# Patient Record
Sex: FEMALE | Race: WHITE | NOT HISPANIC OR LATINO | Employment: FULL TIME | URBAN - METROPOLITAN AREA
[De-identification: names, ages, dates, MRNs, and addresses within clinical notes are randomized per-mention and may not be internally consistent; named-entity substitution may affect disease eponyms.]

---

## 2017-01-18 ENCOUNTER — OFFICE VISIT (OUTPATIENT)
Dept: INTERNAL MEDICINE | Facility: CLINIC | Age: 42
End: 2017-01-18

## 2017-01-18 VITALS
BODY MASS INDEX: 25.16 KG/M2 | DIASTOLIC BLOOD PRESSURE: 74 MMHG | WEIGHT: 151 LBS | HEIGHT: 65 IN | SYSTOLIC BLOOD PRESSURE: 118 MMHG

## 2017-01-18 DIAGNOSIS — M54.50 CHRONIC BILATERAL LOW BACK PAIN WITHOUT SCIATICA: Primary | ICD-10-CM

## 2017-01-18 DIAGNOSIS — Z00.00 HEALTH CARE MAINTENANCE: ICD-10-CM

## 2017-01-18 DIAGNOSIS — G89.29 CHRONIC BILATERAL LOW BACK PAIN WITHOUT SCIATICA: Primary | ICD-10-CM

## 2017-01-18 PROBLEM — M51.36 DEGENERATION OF INTERVERTEBRAL DISC OF LUMBAR REGION: Status: ACTIVE | Noted: 2017-01-18

## 2017-01-18 PROBLEM — M51.369 DEGENERATION OF INTERVERTEBRAL DISC OF LUMBAR REGION: Status: ACTIVE | Noted: 2017-01-18

## 2017-01-18 PROBLEM — N83.209 HEMORRHAGIC CYST OF OVARY: Status: ACTIVE | Noted: 2017-01-18

## 2017-01-18 PROCEDURE — 99213 OFFICE O/P EST LOW 20 MIN: CPT | Performed by: INTERNAL MEDICINE

## 2017-01-18 RX ORDER — LETROZOLE 2.5 MG/1
TABLET, FILM COATED ORAL
Refills: 0 | COMMUNITY
Start: 2016-12-02 | End: 2017-01-18

## 2017-01-18 RX ORDER — METAXALONE 800 MG/1
800 TABLET ORAL 3 TIMES DAILY PRN
Qty: 90 TABLET | Refills: 5
Start: 2017-01-18 | End: 2020-09-01

## 2017-01-18 NOTE — MR AVS SNAPSHOT
Claudia Price   1/18/2017 11:30 AM   Office Visit    Dept Phone:  [phone]   Encounter #:  [id number]    Provider:  Shasta Doyle MD   Department:  [facility] INTERNAL MEDICINE                Your Full Care Plan              Today's Medication Changes          These changes are accurate as of: 1/18/17  1:02 PM.  If you have any questions, ask your nurse or doctor.               New Medication(s)Ordered:     metaxalone 800 MG tablet   Commonly known as:  SKELAXIN   Take 1 tablet by mouth 3 (Three) Times a Day As Needed for muscle spasms.   Started by:  Shasta Doyle MD         Stop taking medication(s)listed here:     letrozole 2.5 MG tablet   Commonly known as:  FEMARA   Stopped by:  Shasta Doyle MD                Where to Get Your Medications      Information about where to get these medications is not yet available     ! Ask your nurse or doctor about these medications     metaxalone 800 MG tablet                  Your Updated Medication List          This list is accurate as of: 1/18/17  1:02 PM.  Always use your most recent med list.                metaxalone 800 MG tablet   Commonly known as:  SKELAXIN   Take 1 tablet by mouth 3 (Three) Times a Day As Needed for muscle spasms.               You Were Diagnosed With        Codes Comments    Chronic bilateral low back pain without sciatica    -  Primary ICD-10-CM: M54.5, G89.29  ICD-9-CM: 724.2, 338.29     Health care maintenance     ICD-10-CM: Z00.00  ICD-9-CM: V70.0       Instructions     None    Patient Instructions History      Upcoming Appointments     Visit Type Date Time Department    OFFICE VISIT 1/18/2017 11:30 AM [facility]    PHYSICAL 3/1/2017 10:45 AM [facility] Signup     [email or web address] allows you to send messages to your doctor, view your test results, renew your prescriptions, schedule appointments, and more. To sign up, go to [employer] and  "click on the Sign Up Now link in the New User? box. Enter your [employer] Activation Code exactly as it appears below along with the last four digits of your Social Security Number and your Date of Birth () to complete the sign-up process. If you do not sign up before the expiration date, you must request a new code.    [employer] Activation Code: [email or web address]  Expires: 2017 12:55 PM    If you have questions, you can email [email or web address] or call [phone] to talk to our [employer] staff. Remember, [employer] is NOT to be used for urgent needs. For medical emergencies, dial 911.               Other Info from Your Visit           Your Appointments     Mar 01, 2017 10:45 AM EST   Physical with Shasta Doyle MD   [facility] INTERNAL MEDICINE (--)    [address] [address] [phone]           Arrive 15 minutes prior to appointment.              Allergies     Pseudoephedrine  Anxiety, Other (See Comments)    Tachycardia 160's, \"felt like a heart attack\"    Nedocromil        Vital Signs     Blood Pressure Height Weight Body Mass Index Smoking Status       118/74 65\" (165.1 cm) 151 lb (68.5 kg) 25.13 kg/m2 Never Smoker       Problems and Diagnoses Noted     Chronic bilateral low back pain    Degeneration of lumbar or lumbosacral intervertebral disc    Health maintenance examination    Ovarian cyst    Uterine fibroid        "

## 2017-01-18 NOTE — PROGRESS NOTES
Subjective   Claudia Price is a 41 y.o. female. Here for lower back pain exacerbation and new onset intermittent numbness in the abdominal wall and pelvic area    History of Present Illness   Patient with known L spine DDD and retrolisthesis L4-5, mild per MRI, had developed acute exacerbation of her chronic lower back pain 5 days ago. Usually high impact exercise provokes episodes of back pain.This time her midline back pain was associated with numbness in the lower abdominal and pelvic area.Thet preceded immediatly by her doing high impact exercise with jumps and possibly excessive stretching. She took Skelaxin at bedtime and this had helped her s-ms. Later the numbness had returned, and had been intermittent since. There was no problems with urination. At some point she had some radiation of the pain to her buttock and numbness in both feet  The following portions of the patient's history were reviewed and updated as appropriate: allergies, current medications, past family history, past medical history, past social history, past surgical history and problem list.    Review of Systems   Constitutional: Negative for chills and fever.   Eyes: Negative for pain and redness.   Respiratory: Negative for cough and shortness of breath.    Cardiovascular: Negative for chest pain and leg swelling.   Musculoskeletal: Positive for back pain.   Neurological: Positive for numbness (between lower back and thighs). Negative for dizziness and headaches.       Objective   Physical Exam   Constitutional: She is oriented to person, place, and time. She appears well-developed and well-nourished.   HENT:   Head: Normocephalic and atraumatic.   Right Ear: Tympanic membrane, external ear and ear canal normal.   Left Ear: Tympanic membrane, external ear and ear canal normal.   Nose: Nose normal. Right sinus exhibits no maxillary sinus tenderness and no frontal sinus tenderness. Left sinus exhibits no maxillary sinus tenderness and no  frontal sinus tenderness.   Mouth/Throat: Uvula is midline, oropharynx is clear and moist and mucous membranes are normal.   Eyes: Conjunctivae and EOM are normal. Pupils are equal, round, and reactive to light. Right eye exhibits no discharge. Left eye exhibits no discharge. No scleral icterus.   Neck: Neck supple. No JVD present.   Cardiovascular: Normal rate, regular rhythm and normal heart sounds.  Exam reveals no gallop and no friction rub.    No murmur heard.  Pulmonary/Chest: Effort normal and breath sounds normal. She has no wheezes. She has no rales.   Musculoskeletal: She exhibits tenderness (on paravertebral palpation along the left L3-5). She exhibits no edema.   Lymphadenopathy:     She has no cervical adenopathy.   Neurological: She is alert and oriented to person, place, and time. No cranial nerve deficit.   Skin: Skin is warm and dry. No rash noted.   Psychiatric: She has a normal mood and affect. Her behavior is normal.   Vitals reviewed.      Assessment/Plan   Diagnoses and all orders for this visit:    Chronic bilateral low back pain without sciatica  -     metaxalone (SKELAXIN) 800 MG tablet; Take 1 tablet by mouth 3 (Three) Times a Day As Needed for muscle spasms.  -     MRI Lumbar Spine With Contrast; Future      Acute exacerbation of the LBP with neurological s-ms - will refer for MRI. Also advised to try heat and Salonpas patches topically. Needs gentle stretching exercise.

## 2017-02-02 PROBLEM — M51.37 DEGENERATIVE DISC DISEASE AT L5-S1 LEVEL: Status: ACTIVE | Noted: 2017-02-02

## 2017-02-02 PROBLEM — M51.379 DEGENERATIVE DISC DISEASE AT L5-S1 LEVEL: Status: ACTIVE | Noted: 2017-02-02

## 2017-02-03 PROBLEM — D25.1 INTRAMURAL LEIOMYOMA OF UTERUS: Status: ACTIVE | Noted: 2017-02-03

## 2017-03-01 ENCOUNTER — OFFICE VISIT (OUTPATIENT)
Dept: INTERNAL MEDICINE | Facility: CLINIC | Age: 42
End: 2017-03-01

## 2017-03-01 VITALS
SYSTOLIC BLOOD PRESSURE: 106 MMHG | DIASTOLIC BLOOD PRESSURE: 66 MMHG | BODY MASS INDEX: 24.49 KG/M2 | WEIGHT: 147 LBS | HEIGHT: 65 IN

## 2017-03-01 DIAGNOSIS — Z00.00 HEALTH CARE MAINTENANCE: Primary | ICD-10-CM

## 2017-03-01 PROBLEM — I73.00 RAYNAUD PHENOMENON: Status: ACTIVE | Noted: 2017-03-01

## 2017-03-01 PROBLEM — J45.909 COLD-INDUCED ASTHMA WITHOUT COMPLICATION: Status: ACTIVE | Noted: 2017-03-01

## 2017-03-01 PROCEDURE — 99396 PREV VISIT EST AGE 40-64: CPT | Performed by: INTERNAL MEDICINE

## 2017-03-01 NOTE — PROGRESS NOTES
"Subjective   Claudia Price is a 41 y.o. female. Here for CPE    History of Present Illness   Visit Vitals   • /66   • Ht 65\" (165.1 cm)   • Wt 147 lb (66.7 kg)   • BMI 24.46 kg/m2     Patient is here for yearly CPE. Last CPE was  1.5 year ago.  PMH, SH and FH reviewed. Changes in the FH: none .  Patient rates her own health as: good.  Tobacco use: none.  Alcohol use: occasionally.  Recreational drugs use: none  Medication list rewieved.  Diet: vegetarian.  Exercise: 5-6 days a week, resistance training and aerobic exercise.  Marital status: .   Employment: full time.  Patient rates her stress level as: low.  Dental health: good. Brushes teeth 2 times a day, flosses 1 time a day . Dental visits: 2 times a year .  Vision correction: not needed  Hearing: normal.    Recent vaccinations:   Flu  is up to date and recommended yearly  Tdap  is up to date  Zostavax not recommended at this age    Patient is premenopausal. Past pregnancies: . Currently patient is on no contraception.      Current Outpatient Prescriptions:   •  metaxalone (SKELAXIN) 800 MG tablet, Take 1 tablet by mouth 3 (Three) Times a Day As Needed for muscle spasms., Disp: 90 tablet, Rfl: 5                           The following portions of the patient's history were reviewed and updated as appropriate: allergies, current medications, past family history, past medical history, past social history, past surgical history and problem list.    Review of Systems   Constitutional: Negative for chills and fever.   HENT: Positive for sinus pressure. Negative for postnasal drip and sore throat.    Eyes: Negative for pain and itching.   Respiratory: Negative for cough and chest tightness.    Cardiovascular: Negative for chest pain and leg swelling.   Gastrointestinal: Negative for abdominal pain and blood in stool.   Endocrine: Negative for cold intolerance and heat intolerance.   Genitourinary: Negative for difficulty urinating and flank pain. "   Musculoskeletal: Positive for back pain. Negative for neck pain.   Skin: Negative for color change and rash.   Neurological: Positive for light-headedness. Negative for dizziness, weakness and headaches.   Hematological: Negative for adenopathy. Does not bruise/bleed easily.   Psychiatric/Behavioral: Negative for sleep disturbance. The patient is not nervous/anxious.        Objective   Physical Exam   Constitutional: She is oriented to person, place, and time. Vital signs are normal. She appears well-developed and well-nourished. No distress.   HENT:   Head: Normocephalic and atraumatic.   Right Ear: External ear normal.   Left Ear: External ear normal.   Nose: Nose normal. No mucosal edema. Right sinus exhibits no maxillary sinus tenderness and no frontal sinus tenderness. Left sinus exhibits no maxillary sinus tenderness and no frontal sinus tenderness.   Mouth/Throat: Oropharynx is clear and moist. No oropharyngeal exudate.   Eyes: Conjunctivae, EOM and lids are normal. Pupils are equal, round, and reactive to light. Right eye exhibits no discharge. Left eye exhibits no discharge. Right conjunctiva is not injected. Left conjunctiva is not injected. No scleral icterus. Right eye exhibits normal extraocular motion. Left eye exhibits normal extraocular motion.   Neck: Normal range of motion and full passive range of motion without pain. Neck supple. No JVD present. Carotid bruit is not present. No thyromegaly present.   Cardiovascular: Normal rate, regular rhythm, S1 normal, S2 normal, normal heart sounds and intact distal pulses.  PMI is not displaced.  Exam reveals no gallop and no friction rub.    No murmur heard.  Pulmonary/Chest: Effort normal and breath sounds normal. No accessory muscle usage. No respiratory distress. She has no decreased breath sounds. She has no wheezes. She has no rhonchi. She has no rales.   Abdominal: Soft. Bowel sounds are normal. She exhibits no distension, no pulsatile liver, no  fluid wave, no abdominal bruit, no ascites and no mass. There is no tenderness. There is no rebound and no guarding.   Musculoskeletal: She exhibits no edema or deformity.   Lymphadenopathy:        Head (right side): No submental, no submandibular, no preauricular, no posterior auricular and no occipital adenopathy present.        Head (left side): No submental, no submandibular, no tonsillar, no preauricular, no posterior auricular and no occipital adenopathy present.     She has no cervical adenopathy.        Right cervical: No superficial cervical, no deep cervical and no posterior cervical adenopathy present.       Left cervical: No superficial cervical, no deep cervical and no posterior cervical adenopathy present.        Right: No supraclavicular adenopathy present.        Left: No supraclavicular adenopathy present.   Neurological: She is alert and oriented to person, place, and time. She has normal strength and normal reflexes. She displays normal reflexes. No cranial nerve deficit. She exhibits normal muscle tone. Coordination normal.   Reflex Scores:       Bicep reflexes are 2+ on the right side and 2+ on the left side.       Patellar reflexes are 2+ on the right side and 2+ on the left side.  Skin: Skin is warm and dry. No rash noted. She is not diaphoretic. No erythema.   Psychiatric: She has a normal mood and affect. Her speech is normal and behavior is normal. Thought content normal.   Vitals reviewed.      Assessment/Plan   Diagnoses and all orders for this visit:    Health care maintenance  -     CBC & Differential; Future  -     Comprehensive Metabolic Panel; Future  -     Lipid Panel; Future  -     TSH; Future  -     Urinalysis With / Microscopic If Indicated; Future        Risk evaluation:  1. Cardiovascular risk factors: none   2. Diabetes risk factors: none.  3. Cancer risk factors: no risk factors for cancer.  4. Risky behavior: none. Use of seat belts: regular. Use of sunscreens: regular. SPF  30+.   Tattoos: none. H/o blood transfusions/organ transplants before 1992 or clotting factor transfusion before 1987: none.     Prevention:  Cholesterol test  will check.  Blood sugar test will check.   Hep C testing (for patients born [birth year]33[birth year]): not needed, patient is not in the age group, and has no risk factors..  Mammogram up to date. Recommended frequency and time of the next screening per GYN.. Breast self exams recommended once a month.  Colonoscopy: not recommended till the age of 50..  PAP smear : up to date. Recommendations per GYN..  DEXA : not indicated yet..     LBP - improved with the change in activity. Continue Skelaxin at bedtime as needed.

## 2017-03-08 ENCOUNTER — TRANSCRIBE ORDERS (OUTPATIENT)
Dept: ADMINISTRATIVE | Facility: HOSPITAL | Age: 42
End: 2017-03-08

## 2017-03-08 DIAGNOSIS — Z12.31 VISIT FOR SCREENING MAMMOGRAM: Primary | ICD-10-CM

## 2017-03-29 ENCOUNTER — HOSPITAL ENCOUNTER (OUTPATIENT)
Dept: MAMMOGRAPHY | Facility: HOSPITAL | Age: 42
Discharge: HOME | End: 2017-03-29
Attending: SPECIALIST | Admitting: SPECIALIST

## 2017-03-29 DIAGNOSIS — Z12.31 VISIT FOR SCREENING MAMMOGRAM: ICD-10-CM

## 2017-03-29 PROCEDURE — 77063 BREAST TOMOSYNTHESIS BI: CPT

## 2017-03-29 PROCEDURE — G0202 SCR MAMMO BI INCL CAD: HCPCS

## 2017-07-27 ENCOUNTER — OFFICE VISIT (OUTPATIENT)
Dept: INTERNAL MEDICINE | Facility: CLINIC | Age: 42
End: 2017-07-27

## 2017-07-27 VITALS
WEIGHT: 146 LBS | BODY MASS INDEX: 24.32 KG/M2 | SYSTOLIC BLOOD PRESSURE: 120 MMHG | RESPIRATION RATE: 14 BRPM | HEIGHT: 65 IN | DIASTOLIC BLOOD PRESSURE: 80 MMHG

## 2017-07-27 DIAGNOSIS — G44.52 NEW DAILY PERSISTENT HEADACHE: Primary | ICD-10-CM

## 2017-07-27 PROCEDURE — 99213 OFFICE O/P EST LOW 20 MIN: CPT | Performed by: INTERNAL MEDICINE

## 2017-07-27 RX ORDER — AMITRIPTYLINE HYDROCHLORIDE 25 MG/1
25 TABLET, FILM COATED ORAL NIGHTLY
Qty: 30 TABLET | Refills: 3 | Status: SHIPPED | OUTPATIENT
Start: 2017-07-27 | End: 2021-10-19

## 2017-07-27 RX ORDER — METHYLPREDNISOLONE 4 MG/1
TABLET ORAL
Refills: 0 | COMMUNITY
Start: 2017-07-25 | End: 2017-09-14

## 2017-07-27 RX ORDER — FLUTICASONE PROPIONATE 50 MCG
1 SPRAY, SUSPENSION (ML) NASAL
COMMUNITY

## 2017-07-27 RX ORDER — IBUPROFEN 600 MG/1
600 TABLET ORAL EVERY 6 HOURS PRN
COMMUNITY
End: 2021-10-19

## 2017-07-27 RX ORDER — CETIRIZINE HYDROCHLORIDE 10 MG/1
10 TABLET ORAL
COMMUNITY

## 2017-07-27 NOTE — PATIENT INSTRUCTIONS
New onset daily headache - possibly TMJ vs tension. Will continue Ibuprofen, but ask patiuent to talk  To her dentist re teeth grinding and start Elavil 25 mg at bedtime. If not better after 3 days, increase to 2 pills at bedtime. Call is not better.

## 2017-07-27 NOTE — PROGRESS NOTES
"Subjective   Claudia Price is a 41 y.o. female. Here for the persistent headache    History of Present Illness   /80 (BP Location: Left arm, Patient Position: Sitting, Cuff Size: Adult)  Resp 14  Ht 65\" (165.1 cm)  Wt 146 lb (66.2 kg)  BMI 24.3 kg/m2  Claudia Price 41 y.o. female presents to the office complaining of the headache. Location of the headache is parietal,  in the upper face, in the lower face and in the right TMJ.   Symptoms started about 2 weeksago. Woke up with throbbing pain in the right parietal area, that slowly moved down the right head and face. Patient states that headaches are daily. Patient had been having headaches continously every day.  Precipitating factors:none which have been determined.Had some neck adjustment that failed to help. No added stress. Had been using OTC Ibuprofen 600 mg TID (originally for the shoulder) with relief. Reports good hydration. Denies snoring.  Work attendance and other daily activities has had not been affected by headaches lately. Denies nausea, photo- and phonophobias, no aura. No prior h/o migraines, but has dignificant headache back in 2013, at that time her head MRI was fine.      Current Outpatient Prescriptions:   •  cetirizine (zyrTEC) 10 MG tablet, Take 10 mg by mouth., Disp: , Rfl:   •  fluticasone (FLONASE) 50 MCG/ACT nasal spray, 1 spray into each nostril., Disp: , Rfl:   •  ibuprofen (ADVIL,MOTRIN) 600 MG tablet, Take 600 mg by mouth Every 6 (Six) Hours As Needed for Mild Pain (1-3)., Disp: , Rfl:   •  metaxalone (SKELAXIN) 800 MG tablet, Take 1 tablet by mouth 3 (Three) Times a Day As Needed for muscle spasms., Disp: 90 tablet, Rfl: 5  •  MethylPREDNISolone (MEDROL, SUSAN,) 4 MG tablet, TK UTD, Disp: , Rfl: 0     The following portions of the patient's history were reviewed and updated as appropriate: allergies, current medications, past family history, past medical history, past social history, past surgical history and problem list.    Review " of Systems   Constitutional: Negative for chills and fever.   Eyes: Negative for pain and redness.   Respiratory: Negative for cough and shortness of breath.    Cardiovascular: Negative for chest pain and leg swelling.   Neurological: Negative for dizziness and headaches.       Objective   Physical Exam   Constitutional: She is oriented to person, place, and time. She appears well-developed and well-nourished.   HENT:   Head: Normocephalic and atraumatic.   Right Ear: Tympanic membrane, external ear and ear canal normal.   Left Ear: Tympanic membrane, external ear and ear canal normal.   Nose: Nose normal. Right sinus exhibits no maxillary sinus tenderness and no frontal sinus tenderness. Left sinus exhibits no maxillary sinus tenderness and no frontal sinus tenderness.   Mouth/Throat: Uvula is midline, oropharynx is clear and moist and mucous membranes are normal.   Eyes: Conjunctivae and EOM are normal. Pupils are equal, round, and reactive to light. Right eye exhibits no discharge. Left eye exhibits no discharge. No scleral icterus.   Neck: Neck supple. No JVD present.   Cardiovascular: Normal rate, regular rhythm and normal heart sounds.  Exam reveals no gallop and no friction rub.    No murmur heard.  Pulmonary/Chest: Effort normal and breath sounds normal. She has no wheezes. She has no rales.   Musculoskeletal: She exhibits tenderness (minimal R TMJ tenderness on palpation with ROM). She exhibits no edema.   Lymphadenopathy:     She has no cervical adenopathy.   Neurological: She is alert and oriented to person, place, and time. No cranial nerve deficit.   Skin: Skin is warm and dry. No rash noted.   Psychiatric: She has a normal mood and affect. Her behavior is normal.   Vitals reviewed.      Assessment/Plan   Claudia was seen today for headache.    Diagnoses and all orders for this visit:    New daily persistent headache    New onset daily headache - possibly TMJ vs tension. Will continue Ibuprofen, but ask  patiuent to talk  To her dentist re teeth grinding and start Elavil 25 mg at bedtime. If not better after 3 days, increase to 2 pills at bedtime. Call is not better.

## 2017-09-14 ENCOUNTER — OFFICE VISIT (OUTPATIENT)
Dept: INTERNAL MEDICINE | Facility: CLINIC | Age: 42
End: 2017-09-14

## 2017-09-14 VITALS
DIASTOLIC BLOOD PRESSURE: 78 MMHG | WEIGHT: 149 LBS | TEMPERATURE: 98.1 F | SYSTOLIC BLOOD PRESSURE: 112 MMHG | BODY MASS INDEX: 24.79 KG/M2

## 2017-09-14 DIAGNOSIS — H10.9 BACTERIAL CONJUNCTIVITIS OF RIGHT EYE: Primary | ICD-10-CM

## 2017-09-14 PROCEDURE — 99213 OFFICE O/P EST LOW 20 MIN: CPT | Performed by: NURSE PRACTITIONER

## 2017-09-14 RX ORDER — CIPROFLOXACIN HYDROCHLORIDE 3.5 MG/ML
1 SOLUTION/ DROPS TOPICAL
Qty: 2.5 ML | Refills: 0 | Status: SHIPPED | OUTPATIENT
Start: 2017-09-14 | End: 2017-09-21

## 2017-09-14 NOTE — PROGRESS NOTES
Subjective   Claudia Price is a 41 y.o. female.     HPI Comments: She was at a kids party this weekend.     Eye Problem    The right eye is affected. This is a new problem. The current episode started today. There was no injury mechanism. The pain is at a severity of 0/10. The patient is experiencing no pain. There is no known exposure to pink eye. She does not wear contacts. Associated symptoms include blurred vision, an eye discharge, eye redness, itching and a recent URI (about 4 weeks ago ). Pertinent negatives include no double vision, fever, foreign body sensation, nausea, photophobia or vomiting. She has tried water for the symptoms. The treatment provided mild relief.        The following portions of the patient's history were reviewed and updated as appropriate: allergies, current medications and problem list.    Review of Systems   Constitutional: Negative for appetite change, chills, fatigue and fever.   HENT: Positive for rhinorrhea, sinus pressure (very little bit ) and sneezing. Negative for congestion, ear discharge, ear pain, facial swelling, hearing loss, sore throat and tinnitus.    Eyes: Positive for blurred vision, discharge, redness and itching. Negative for double vision, photophobia and pain (eye burning ).   Respiratory: Positive for cough (dry cough at nigh time, getting better ). Negative for chest tightness, shortness of breath and wheezing.    Cardiovascular: Negative for chest pain.   Gastrointestinal: Negative for abdominal pain, diarrhea, nausea and vomiting.   Musculoskeletal: Negative for neck pain and neck stiffness.   Skin: Positive for itching.   Allergic/Immunologic: Positive for environmental allergies.   Neurological: Negative for headaches.   Hematological: Negative for adenopathy.       Objective   Physical Exam   Constitutional: She appears well-developed and well-nourished. She is cooperative. She does not have a sickly appearance. She does not appear ill.   HENT:   Head:  Normocephalic.   Right Ear: Hearing, tympanic membrane and external ear normal. No drainage, swelling or tenderness. No mastoid tenderness. Tympanic membrane is not injected, not scarred, not erythematous and not bulging. Tympanic membrane mobility is normal. No middle ear effusion. No decreased hearing is noted.   Left Ear: Hearing, tympanic membrane and external ear normal. No drainage, swelling or tenderness. No mastoid tenderness. Tympanic membrane is not injected, not scarred, not erythematous and not bulging. Tympanic membrane mobility is normal.  No middle ear effusion. No decreased hearing is noted.   Nose: No mucosal edema, rhinorrhea, sinus tenderness or nasal deformity. Right sinus exhibits maxillary sinus tenderness. Right sinus exhibits no frontal sinus tenderness. Left sinus exhibits maxillary sinus tenderness. Left sinus exhibits no frontal sinus tenderness.   Mouth/Throat: Oropharynx is clear and moist and mucous membranes are normal. Normal dentition.   Eyes: EOM and lids are normal. Pupils are equal, round, and reactive to light. Right eye exhibits no discharge and no exudate. Left eye exhibits no discharge and no exudate. Right conjunctiva is injected.   Neck: Trachea normal and normal range of motion. No edema present. No thyroid mass and no thyromegaly present.   Cardiovascular: Regular rhythm, normal heart sounds and normal pulses.    No murmur heard.  Pulmonary/Chest: Breath sounds normal. No respiratory distress. She has no decreased breath sounds. She has no wheezes. She has no rhonchi. She has no rales.   Lymphadenopathy:        Head (right side): No submental, no submandibular, no tonsillar, no preauricular, no posterior auricular and no occipital adenopathy present.        Head (left side): No submental, no submandibular, no tonsillar, no preauricular, no posterior auricular and no occipital adenopathy present.     She has no cervical adenopathy.   Neurological: She is alert.   Skin: Skin  is warm, dry and intact. No cyanosis. Nails show no clubbing.       Assessment/Plan   Claudia was seen today for eye problem.    Diagnoses and all orders for this visit:    Bacterial conjunctivitis of right eye  -     ciprofloxacin (CILOXAN) 0.3 % ophthalmic solution; Administer 1 drop to the right eye Every 4 (Four) Hours While Awake for 7 days.        Work excuse provided. She may return in 24 hours after starting antibiotic. She was instructed to no eye makeup.

## 2018-04-11 ENCOUNTER — TRANSCRIBE ORDERS (OUTPATIENT)
Dept: ADMINISTRATIVE | Facility: HOSPITAL | Age: 43
End: 2018-04-11

## 2018-04-11 DIAGNOSIS — Z12.31 SCREENING MAMMOGRAM, ENCOUNTER FOR: Primary | ICD-10-CM

## 2018-04-25 ENCOUNTER — HOSPITAL ENCOUNTER (OUTPATIENT)
Dept: MAMMOGRAPHY | Facility: HOSPITAL | Age: 43
Discharge: HOME | End: 2018-04-25
Attending: SPECIALIST | Admitting: SPECIALIST

## 2018-04-25 DIAGNOSIS — Z12.31 SCREENING MAMMOGRAM, ENCOUNTER FOR: ICD-10-CM

## 2018-04-25 PROCEDURE — 77063 BREAST TOMOSYNTHESIS BI: CPT

## 2018-04-25 PROCEDURE — 77067 SCR MAMMO BI INCL CAD: CPT

## 2018-05-16 ENCOUNTER — TELEPHONE (OUTPATIENT)
Dept: INTERNAL MEDICINE | Facility: CLINIC | Age: 43
End: 2018-05-16

## 2018-05-16 NOTE — TELEPHONE ENCOUNTER
----- Message from Annika Egan sent at 5/16/2018 11:16 AM EDT -----  Contact: pt - Dr Doyle's pt - RE: lab order & Factor 5  Pt calling and would like to have labs drawn on 05/30/2018 prior to CPE on 06/06/2018. Pt would also like to have a Factor 5 drawn as well. Pt informs father was just Dx with and is hereditary. Could you please add to pt's other labs? Please advise. Thanks

## 2018-05-17 DIAGNOSIS — D68.8 HEREDITARY THROMBOPHILIA (HCC): ICD-10-CM

## 2018-05-17 DIAGNOSIS — Z00.00 HEALTH CARE MAINTENANCE: Primary | ICD-10-CM

## 2018-05-17 PROBLEM — M51.379 DEGENERATIVE DISC DISEASE AT L5-S1 LEVEL: Status: RESOLVED | Noted: 2017-02-02 | Resolved: 2018-05-17

## 2018-05-17 PROBLEM — G44.52 NEW DAILY PERSISTENT HEADACHE: Status: RESOLVED | Noted: 2017-07-27 | Resolved: 2018-05-17

## 2018-05-17 PROBLEM — M51.37 DEGENERATIVE DISC DISEASE AT L5-S1 LEVEL: Status: RESOLVED | Noted: 2017-02-02 | Resolved: 2018-05-17

## 2018-05-22 ENCOUNTER — RESULTS ENCOUNTER (OUTPATIENT)
Dept: INTERNAL MEDICINE | Facility: CLINIC | Age: 43
End: 2018-05-22

## 2018-05-22 DIAGNOSIS — D68.8 HEREDITARY THROMBOPHILIA (HCC): ICD-10-CM

## 2018-06-06 ENCOUNTER — OFFICE VISIT (OUTPATIENT)
Dept: INTERNAL MEDICINE | Facility: CLINIC | Age: 43
End: 2018-06-06

## 2018-06-06 VITALS
DIASTOLIC BLOOD PRESSURE: 74 MMHG | BODY MASS INDEX: 24.99 KG/M2 | WEIGHT: 150 LBS | HEIGHT: 65 IN | SYSTOLIC BLOOD PRESSURE: 122 MMHG | RESPIRATION RATE: 14 BRPM

## 2018-06-06 DIAGNOSIS — Z00.00 HEALTH CARE MAINTENANCE: Primary | ICD-10-CM

## 2018-06-06 PROBLEM — Z98.890 H/O MYOMECTOMY: Status: ACTIVE | Noted: 2017-11-22

## 2018-06-06 PROBLEM — D68.51 HETEROZYGOUS FACTOR V LEIDEN MUTATION (HCC): Status: ACTIVE | Noted: 2018-06-06

## 2018-06-06 PROCEDURE — 99396 PREV VISIT EST AGE 40-64: CPT | Performed by: INTERNAL MEDICINE

## 2018-06-06 NOTE — PROGRESS NOTES
"Subjective   Claudia Price is a 42 y.o. female.     History of Present Illness   /74 (BP Location: Left arm, Patient Position: Sitting, Cuff Size: Adult)   Resp 14   Ht 165.1 cm (65\")   Wt 68 kg (150 lb)   BMI 24.96 kg/m²   Patient is here for yearly CPE. Last CPE was  1 year ago.  PMH, SH and FH reviewed. Changes in the FH: none .  Patient rates her own health as: good.  Tobacco use: none.  Alcohol use: once a week.  Recreational drugs use: none  Medication list rewieved.  Diet: well balanced.  Exercise: 5-6 days a week, resistance training and aerobic exercise.  Marital status: .   Employment: full time.  Patient rates her stress level as: moderate.  Dental health: good. Brushes teeth 2 times a day, flosses 1 time a day . Dental visits: 2 times a year .  Vision correction: not needed  Hearing: normal.    Recent vaccinations:   Flu  is up to date and recommended yearly  Tdap  is up to date  Zostavax not recommended at this age    Patient is perimenopausal. Past pregnancies: . Currently patient is on no contraception.      Current Outpatient Prescriptions:   •  amitriptyline (ELAVIL) 25 MG tablet, Take 1 tablet by mouth Every Night., Disp: 30 tablet, Rfl: 3  •  cetirizine (zyrTEC) 10 MG tablet, Take 10 mg by mouth., Disp: , Rfl:   •  EVENING PRIMROSE OIL PO, Take  by mouth., Disp: , Rfl:   •  fluticasone (FLONASE) 50 MCG/ACT nasal spray, 1 spray into each nostril., Disp: , Rfl:   •  ibuprofen (ADVIL,MOTRIN) 600 MG tablet, Take 600 mg by mouth Every 6 (Six) Hours As Needed for Mild Pain (1-3)., Disp: , Rfl:   •  metaxalone (SKELAXIN) 800 MG tablet, Take 1 tablet by mouth 3 (Three) Times a Day As Needed for muscle spasms., Disp: 90 tablet, Rfl: 5        The following portions of the patient's history were reviewed and updated as appropriate: allergies, current medications, past family history, past medical history, past social history, past surgical history and problem list.    Review of Systems "   Constitutional: Negative for chills and fever.   HENT: Negative for postnasal drip, sinus pressure and sore throat.    Eyes: Negative for pain and itching.   Respiratory: Negative for cough and chest tightness.    Cardiovascular: Negative for chest pain and leg swelling.   Gastrointestinal: Negative for abdominal pain and blood in stool.   Endocrine: Negative for cold intolerance and heat intolerance.   Genitourinary: Negative for difficulty urinating and flank pain.   Musculoskeletal: Negative for back pain and neck pain.   Skin: Negative for color change and rash.   Neurological: Negative for dizziness, weakness and headaches.   Hematological: Negative for adenopathy. Does not bruise/bleed easily.   Psychiatric/Behavioral: Negative for sleep disturbance. The patient is not nervous/anxious.        Objective   Physical Exam   Constitutional: She is oriented to person, place, and time. Vital signs are normal. She appears well-developed. No distress.   central weight distribution   HENT:   Head: Normocephalic and atraumatic.   Right Ear: External ear normal.   Left Ear: External ear normal.   Nose: Nose normal. No mucosal edema. Right sinus exhibits no maxillary sinus tenderness and no frontal sinus tenderness. Left sinus exhibits no maxillary sinus tenderness and no frontal sinus tenderness.   Mouth/Throat: Oropharynx is clear and moist. No oropharyngeal exudate.   Eyes: Conjunctivae, EOM and lids are normal. Pupils are equal, round, and reactive to light. Right eye exhibits no discharge. Left eye exhibits no discharge. Right conjunctiva is not injected. Left conjunctiva is not injected. No scleral icterus. Right eye exhibits normal extraocular motion. Left eye exhibits normal extraocular motion.   Neck: Normal range of motion and full passive range of motion without pain. Neck supple. No JVD present. Carotid bruit is not present. No thyromegaly present.   Cardiovascular: Normal rate, regular rhythm, S1 normal, S2  normal, normal heart sounds and intact distal pulses.  PMI is not displaced.  Exam reveals no gallop and no friction rub.    No murmur heard.  Pulmonary/Chest: Effort normal and breath sounds normal. No accessory muscle usage. No respiratory distress. She has no decreased breath sounds. She has no wheezes. She has no rhonchi. She has no rales.   Abdominal: Soft. Bowel sounds are normal. She exhibits no distension, no pulsatile liver, no fluid wave, no abdominal bruit, no ascites and no mass. There is no tenderness. There is no rebound and no guarding.   Musculoskeletal: She exhibits no edema or deformity.   Lymphadenopathy:        Head (right side): No submental, no submandibular, no preauricular, no posterior auricular and no occipital adenopathy present.        Head (left side): No submental, no submandibular, no tonsillar, no preauricular, no posterior auricular and no occipital adenopathy present.     She has no cervical adenopathy.        Right cervical: No superficial cervical, no deep cervical and no posterior cervical adenopathy present.       Left cervical: No superficial cervical, no deep cervical and no posterior cervical adenopathy present.        Right: No supraclavicular adenopathy present.        Left: No supraclavicular adenopathy present.   Neurological: She is alert and oriented to person, place, and time. She has normal strength and normal reflexes. She displays normal reflexes. No cranial nerve deficit. She exhibits normal muscle tone. Coordination normal.   Reflex Scores:       Bicep reflexes are 2+ on the right side and 2+ on the left side.       Patellar reflexes are 2+ on the right side and 2+ on the left side.  Skin: Skin is warm and dry. No rash noted. She is not diaphoretic. No erythema.   Psychiatric: She has a normal mood and affect. Her speech is normal and behavior is normal. Thought content normal.   Vitals reviewed.      Assessment/Plan   Claudia was seen today for annual  exam.    Diagnoses and all orders for this visit:    Health care maintenance  -     CBC & Differential; Future  -     Comprehensive Metabolic Panel; Future  -     Lipid Panel; Future  -     TSH; Future  -     Urinalysis With / Microscopic If Indicated (No Culture) - Urine, Clean Catch; Future      Risk evaluation:  1. Cardiovascular risk factors: family history of CAD   2. Diabetes risk factors: none.  3. Cancer risk factors: FH of breast cancer.  4. Risky behavior: none. Use of seat belts: regular. Use of sunscreens: regular. SPF 50.   Tattoos: one.  H/o blood transfusions/organ transplants before 1992 or clotting factor transfusion before 1987: none.     Prevention:  Cholesterol test  up to date. Cholesterol is excellent..  Blood sugar test up to date. Fasting blood sugar  is normal..  Hep C testing (for patients born [id number]): not needed, patient is not in the age group, and has no risk factors..  Mammogram up to date. Recommended frequency and time of the next screening per GYN.. Breast self exams recommended once a month.  Colonoscopy: not recommended till the age of 50..  PAP smear : up to date. Recommendations per GYN..  DEXA : not indicated yet..                      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 Wear comp[eression stockings for long car rides and airplane rides.

## 2018-06-07 DIAGNOSIS — Z00.00 HEALTH CARE MAINTENANCE: ICD-10-CM

## 2018-06-11 DIAGNOSIS — Z00.00 HEALTH CARE MAINTENANCE: ICD-10-CM

## 2018-10-19 ENCOUNTER — OFFICE VISIT (OUTPATIENT)
Dept: INTERNAL MEDICINE | Facility: CLINIC | Age: 43
End: 2018-10-19

## 2018-10-19 VITALS
DIASTOLIC BLOOD PRESSURE: 82 MMHG | HEART RATE: 75 BPM | SYSTOLIC BLOOD PRESSURE: 116 MMHG | BODY MASS INDEX: 25.33 KG/M2 | WEIGHT: 152 LBS | TEMPERATURE: 98.1 F | HEIGHT: 65 IN | OXYGEN SATURATION: 98 %

## 2018-10-19 DIAGNOSIS — J01.90 ACUTE SINUSITIS, RECURRENCE NOT SPECIFIED, UNSPECIFIED LOCATION: Primary | ICD-10-CM

## 2018-10-19 PROCEDURE — 99213 OFFICE O/P EST LOW 20 MIN: CPT | Performed by: NURSE PRACTITIONER

## 2018-10-19 RX ORDER — AMOXICILLIN 875 MG/1
875 TABLET, COATED ORAL 2 TIMES DAILY
Qty: 14 TABLET | Refills: 0 | Status: SHIPPED | OUTPATIENT
Start: 2018-10-19 | End: 2018-10-26

## 2018-10-19 RX ORDER — GUAIFENESIN 600 MG/1
1200 TABLET, EXTENDED RELEASE ORAL 2 TIMES DAILY
COMMUNITY
End: 2021-10-19

## 2018-10-19 NOTE — PATIENT INSTRUCTIONS

## 2018-10-19 NOTE — PROGRESS NOTES
Subjective   Claudia Price is a 43 y.o. female.     She went to [facility] on Saturday and tested negative for flu. She was given oral steroid which helped temporarily.       Sinus Problem   This is a new problem. The current episode started 1 to 4 weeks ago. The maximum temperature recorded prior to her arrival was 100.4 - 100.9 F. The fever has been present for 1 to 2 days. Her pain is at a severity of 6/10. Associated symptoms include chills, congestion, ear pain (left ear only ), headaches and sinus pressure. Pertinent negatives include no coughing, shortness of breath, sneezing or sore throat. Treatments tried: oral steroid, flonase, zyrtec, luisa pot, ibuprofen         The following portions of the patient's history were reviewed and updated as appropriate: allergies, current medications, past family history, past medical history, past social history, past surgical history and problem list.    Review of Systems   Constitutional: Positive for chills and fever. Negative for appetite change and fatigue.   HENT: Positive for congestion, ear pain (left ear only ), postnasal drip, sinus pain and sinus pressure. Negative for ear discharge, facial swelling, hearing loss, rhinorrhea, sneezing, sore throat and tinnitus.    Respiratory: Negative for cough, chest tightness, shortness of breath and wheezing.    Cardiovascular: Negative for chest pain, palpitations and leg swelling.   Gastrointestinal: Negative for abdominal pain.   Allergic/Immunologic: Positive for environmental allergies.   Neurological: Positive for headaches.   Hematological: Negative for adenopathy.       Objective   Physical Exam   Constitutional: She appears well-developed and well-nourished. She is cooperative. She does not have a sickly appearance. She does not appear ill.   HENT:   Head: Normocephalic.   Right Ear: Hearing and external ear normal. No drainage, swelling or tenderness. No mastoid tenderness. Tympanic membrane is bulging. Tympanic  membrane is not injected, not scarred and not erythematous. Tympanic membrane mobility is normal. No middle ear effusion. No decreased hearing is noted.   Left Ear: Hearing and external ear normal. No drainage, swelling or tenderness. No mastoid tenderness. Tympanic membrane is bulging. Tympanic membrane is not injected and not erythematous. Tympanic membrane mobility is normal.  No middle ear effusion. No decreased hearing is noted.   Nose: No mucosal edema, rhinorrhea, sinus tenderness or nasal deformity. Right sinus exhibits maxillary sinus tenderness and frontal sinus tenderness. Left sinus exhibits maxillary sinus tenderness and frontal sinus tenderness.   Mouth/Throat: Mucous membranes are normal. Normal dentition. Posterior oropharyngeal erythema (mild ) present.   Eyes: Pupils are equal, round, and reactive to light. Conjunctivae and lids are normal. Right eye exhibits no discharge and no exudate. Left eye exhibits no discharge and no exudate.   Neck: Trachea normal and normal range of motion. No edema present. No thyroid mass and no thyromegaly present.   Cardiovascular: Regular rhythm, normal heart sounds and normal pulses.    No murmur heard.  Pulmonary/Chest: Breath sounds normal. No respiratory distress. She has no decreased breath sounds. She has no wheezes. She has no rhonchi. She has no rales.   Lymphadenopathy:        Head (right side): No submental, no submandibular, no tonsillar, no preauricular, no posterior auricular and no occipital adenopathy present.        Head (left side): No submental, no submandibular, no tonsillar, no preauricular, no posterior auricular and no occipital adenopathy present.     She has no cervical adenopathy.   Neurological: She is alert.   Skin: Skin is warm, dry and intact. No cyanosis. Nails show no clubbing.       Assessment/Plan   Claudia was seen today for sinus problem.    Diagnoses and all orders for this visit:    Acute sinusitis, recurrence not specified,  unspecified location  Comments:  increase mucinex; push fluids, continue with flonase   Orders:  -     amoxicillin (AMOXIL) 875 MG tablet; Take 1 tablet by mouth 2 (Two) Times a Day for 7 days.    She will return for worsening of symptoms.

## 2019-04-10 ENCOUNTER — TRANSCRIBE ORDERS (OUTPATIENT)
Dept: ADMINISTRATIVE | Facility: HOSPITAL | Age: 44
End: 2019-04-10

## 2019-04-10 DIAGNOSIS — Z12.39 SCREENING BREAST EXAMINATION: Primary | ICD-10-CM

## 2019-04-26 ENCOUNTER — HOSPITAL ENCOUNTER (OUTPATIENT)
Dept: MAMMOGRAPHY | Facility: HOSPITAL | Age: 44
Discharge: HOME | End: 2019-04-26
Admitting: SPECIALIST

## 2019-04-26 DIAGNOSIS — Z12.39 SCREENING BREAST EXAMINATION: ICD-10-CM

## 2019-04-26 PROCEDURE — 77063 BREAST TOMOSYNTHESIS BI: CPT

## 2019-04-26 PROCEDURE — 77067 SCR MAMMO BI INCL CAD: CPT

## 2020-03-11 ENCOUNTER — TRANSCRIBE ORDERS (OUTPATIENT)
Dept: ADMINISTRATIVE | Facility: HOSPITAL | Age: 45
End: 2020-03-11

## 2020-03-11 DIAGNOSIS — Z12.31 SCREENING MAMMOGRAM, ENCOUNTER FOR: Primary | ICD-10-CM

## 2020-04-29 ENCOUNTER — APPOINTMENT (OUTPATIENT)
Dept: MAMMOGRAPHY | Facility: HOSPITAL | Age: 45
End: 2020-04-29

## 2020-05-27 ENCOUNTER — APPOINTMENT (OUTPATIENT)
Dept: MAMMOGRAPHY | Facility: HOSPITAL | Age: 45
End: 2020-05-27

## 2020-06-19 ENCOUNTER — OFFICE VISIT (OUTPATIENT)
Dept: INTERNAL MEDICINE | Facility: CLINIC | Age: 45
End: 2020-06-19

## 2020-06-19 VITALS
DIASTOLIC BLOOD PRESSURE: 70 MMHG | HEIGHT: 65 IN | OXYGEN SATURATION: 98 % | SYSTOLIC BLOOD PRESSURE: 122 MMHG | BODY MASS INDEX: 25.99 KG/M2 | WEIGHT: 156 LBS | HEART RATE: 73 BPM

## 2020-06-19 DIAGNOSIS — Z00.00 HEALTH CARE MAINTENANCE: Primary | ICD-10-CM

## 2020-06-19 PROCEDURE — 99396 PREV VISIT EST AGE 40-64: CPT | Performed by: INTERNAL MEDICINE

## 2020-06-19 NOTE — PROGRESS NOTES
"Subjective   Claudia Price is a 44 y.o. female.     History of Present Illness   /70   Pulse 73   Ht 165.1 cm (65\")   Wt 70.8 kg (156 lb)   SpO2 98%   BMI 25.96 kg/m²   Patient is here for yearly CPE. Last CPE was  1 year ago.  PMH, SH and FH reviewed. Changes in the FH: none .  Patient rates her own health as: good.  Tobacco use: none.  Alcohol use:seldom.  Recreational drugs use: none  Medication list rewieved.  Diet: well balanced.  Exercise: 5-6 days a week, resistance training and aerobic exercise.  Marital status: .   Employment: full time.  Patient rates her stress level as: high.  Dental health: good. Brushes teeth 2 times a day, flosses 1 time a day . Dental visits: 2 times a year .  Vision correction: not needed  Hearing: normal.    Recent vaccinations:   Flu  is up to date and recommended yearly  Tdap  is up to date  Shingles prevention completed    Patient is premenopausal. Past pregnancies: . Currently patient is on no contraception.      Current Outpatient Medications:   •  amitriptyline (ELAVIL) 25 MG tablet, Take 1 tablet by mouth Every Night. (Patient taking differently: Take 25 mg by mouth At Night As Needed.), Disp: 30 tablet, Rfl: 3  •  cetirizine (zyrTEC) 10 MG tablet, Take 10 mg by mouth., Disp: , Rfl:   •  EVENING PRIMROSE OIL PO, Take  by mouth., Disp: , Rfl:   •  fluticasone (FLONASE) 50 MCG/ACT nasal spray, 1 spray into each nostril., Disp: , Rfl:   •  guaiFENesin (MUCINEX) 600 MG 12 hr tablet, Take 1,200 mg by mouth 2 (Two) Times a Day., Disp: , Rfl:   •  ibuprofen (ADVIL,MOTRIN) 600 MG tablet, Take 600 mg by mouth Every 6 (Six) Hours As Needed for Mild Pain (1-3)., Disp: , Rfl:   •  metaxalone (SKELAXIN) 800 MG tablet, Take 1 tablet by mouth 3 (Three) Times a Day As Needed for muscle spasms., Disp: 90 tablet, Rfl: 5        The following portions of the patient's history were reviewed and updated as appropriate: allergies, current medications, past family history, past " medical history, past social history, past surgical history and problem list.    Review of Systems   Constitutional: Negative for chills and fever.   HENT: Negative for postnasal drip, sinus pressure and sore throat.    Eyes: Negative for pain and itching.   Respiratory: Negative for cough and chest tightness.    Cardiovascular: Negative for chest pain and leg swelling (feet at times).   Gastrointestinal: Negative for abdominal pain and blood in stool.   Endocrine: Negative for cold intolerance (don't do well with cold) and heat intolerance.   Genitourinary: Negative for difficulty urinating and flank pain.   Musculoskeletal: Positive for back pain. Negative for neck pain.   Skin: Negative for color change and rash.   Neurological: Positive for headaches. Negative for dizziness and weakness.   Hematological: Negative for adenopathy. Does not bruise/bleed easily.   Psychiatric/Behavioral: Negative for sleep disturbance. The patient is not nervous/anxious.        Objective   Physical Exam   Constitutional: She is oriented to person, place, and time. Vital signs are normal. She appears well-developed and well-nourished. No distress.   HENT:   Head: Normocephalic and atraumatic.   Right Ear: External ear normal.   Left Ear: External ear normal.   Nose: Nose normal. No mucosal edema. Right sinus exhibits no maxillary sinus tenderness and no frontal sinus tenderness. Left sinus exhibits no maxillary sinus tenderness and no frontal sinus tenderness.   Mouth/Throat: Oropharynx is clear and moist. No oropharyngeal exudate.   Eyes: Pupils are equal, round, and reactive to light. Conjunctivae, EOM and lids are normal. Right eye exhibits no discharge. Left eye exhibits no discharge. Right conjunctiva is not injected. Left conjunctiva is not injected. No scleral icterus. Right eye exhibits normal extraocular motion. Left eye exhibits normal extraocular motion.   Neck: Normal range of motion and full passive range of motion  without pain. Neck supple. No JVD present. Carotid bruit is not present. No thyromegaly present.   Cardiovascular: Normal rate, regular rhythm, S1 normal, S2 normal, normal heart sounds and intact distal pulses. PMI is not displaced. Exam reveals no gallop and no friction rub.   No murmur heard.  Pulmonary/Chest: Effort normal and breath sounds normal. No accessory muscle usage. No respiratory distress. She has no decreased breath sounds. She has no wheezes. She has no rhonchi. She has no rales.   Abdominal: Soft. Bowel sounds are normal. She exhibits no distension, no pulsatile liver, no fluid wave, no abdominal bruit, no ascites and no mass. There is no tenderness. There is no rebound and no guarding.   Musculoskeletal: She exhibits no edema ( Trace edema over the left ankle and foot, lower leg) or deformity.   Lymphadenopathy:        Head (right side): No submental, no submandibular, no preauricular, no posterior auricular and no occipital adenopathy present.        Head (left side): No submental, no submandibular, no tonsillar, no preauricular, no posterior auricular and no occipital adenopathy present.     She has no cervical adenopathy.        Right cervical: No superficial cervical, no deep cervical and no posterior cervical adenopathy present.       Left cervical: No superficial cervical, no deep cervical and no posterior cervical adenopathy present.        Right: No supraclavicular adenopathy present.        Left: No supraclavicular adenopathy present.   Neurological: She is alert and oriented to person, place, and time. She has normal strength and normal reflexes. She displays normal reflexes. No cranial nerve deficit. She exhibits normal muscle tone. Coordination normal.   Reflex Scores:       Bicep reflexes are 2+ on the right side and 2+ on the left side.       Patellar reflexes are 2+ on the right side and 2+ on the left side.  Skin: Skin is warm and dry. No rash noted. She is not diaphoretic. No  erythema.   Tattoo right forearm.  The temperature is minimally decreased over the left lower leg and foot.   Psychiatric: She has a normal mood and affect. Her speech is normal and behavior is normal. Thought content normal.   Vitals reviewed.      Assessment/Plan   Claudia was seen today for annual exam.    Diagnoses and all orders for this visit:    Health care maintenance      Risk evaluation:  1. Cardiovascular risk factors: family history of CAD   2. Diabetes risk factors: none.  3. Cancer risk factors: FH of breast cancer.  4. Risky behavior: none. Use of seat belts: regular. Use of sunscreens: regular. SPF 50.   Tattoos: one.  H/o blood transfusions/organ transplants before 1992 or clotting factor transfusion before 1987: none.     Prevention:  Cholesterol test  recommended. Cholesterol is will be checked..  Blood sugar test recommended. Fasting blood sugar will be checked..  Hep C testing (for patients born [id number]): discussed and recommended, will proceed with testing..  Mammogram up to date. Recommended frequency and time of the next screening per GYN.. Breast self exams recommended once a month.  Colonoscopy: not recommended till the age of 50..  PAP smear : up to date. Recommendations per GYN..  DEXA : not indicated yet..

## 2020-06-19 NOTE — PATIENT INSTRUCTIONS
Risk evaluation:  1. Cardiovascular risk factors: family history of CAD   2. Diabetes risk factors: none.  3. Cancer risk factors: FH of breast cancer.  4. Risky behavior: none. Use of seat belts: regular. Use of sunscreens: regular. SPF 50.   Tattoos: one.  H/o blood transfusions/organ transplants before 1992 or clotting factor transfusion before 1987: none.     Prevention:  Cholesterol test  recommended. Cholesterol is will be checked..  Blood sugar test recommended. Fasting blood sugar will be checked..  Hep C testing (for patients born [id number]): discussed and recommended, will proceed with testing..  Mammogram up to date. Recommended frequency and time of the next screening per GYN.. Breast self exams recommended once a month.  Colonoscopy: not recommended till the age of 50..  PAP smear : up to date. Recommendations per GYN..  DEXA : not indicated yet..

## 2020-07-08 ENCOUNTER — HOSPITAL ENCOUNTER (OUTPATIENT)
Dept: MAMMOGRAPHY | Facility: HOSPITAL | Age: 45
Discharge: HOME | End: 2020-07-08
Admitting: SPECIALIST

## 2020-07-08 DIAGNOSIS — Z12.31 SCREENING MAMMOGRAM, ENCOUNTER FOR: ICD-10-CM

## 2020-07-08 PROCEDURE — 77063 BREAST TOMOSYNTHESIS BI: CPT

## 2020-07-08 PROCEDURE — 77067 SCR MAMMO BI INCL CAD: CPT

## 2020-09-01 ENCOUNTER — HOSPITAL ENCOUNTER (OUTPATIENT)
Dept: CARDIOLOGY | Facility: HOSPITAL | Age: 45
Discharge: HOME | End: 2020-09-01
Admitting: NURSE PRACTITIONER

## 2020-09-01 ENCOUNTER — OFFICE VISIT (OUTPATIENT)
Dept: INTERNAL MEDICINE | Facility: CLINIC | Age: 45
End: 2020-09-01

## 2020-09-01 VITALS
OXYGEN SATURATION: 99 % | HEIGHT: 65 IN | SYSTOLIC BLOOD PRESSURE: 134 MMHG | HEART RATE: 62 BPM | DIASTOLIC BLOOD PRESSURE: 84 MMHG | BODY MASS INDEX: 26.62 KG/M2 | WEIGHT: 159.8 LBS

## 2020-09-01 DIAGNOSIS — M79.661 RIGHT CALF PAIN: ICD-10-CM

## 2020-09-01 DIAGNOSIS — M79.661 RIGHT CALF PAIN: Primary | ICD-10-CM

## 2020-09-01 PROBLEM — D68.51 FACTOR 5 LEIDEN MUTATION, HETEROZYGOUS (HCC): Status: ACTIVE | Noted: 2019-07-01

## 2020-09-01 LAB
BH CV LOWER VASCULAR LEFT COMMON FEMORAL AUGMENT: NORMAL
BH CV LOWER VASCULAR LEFT COMMON FEMORAL COMPETENT: NORMAL
BH CV LOWER VASCULAR LEFT COMMON FEMORAL COMPRESS: NORMAL
BH CV LOWER VASCULAR LEFT COMMON FEMORAL PHASIC: NORMAL
BH CV LOWER VASCULAR LEFT COMMON FEMORAL SPONT: NORMAL
BH CV LOWER VASCULAR RIGHT COMMON FEMORAL AUGMENT: NORMAL
BH CV LOWER VASCULAR RIGHT COMMON FEMORAL COMPETENT: NORMAL
BH CV LOWER VASCULAR RIGHT COMMON FEMORAL COMPRESS: NORMAL
BH CV LOWER VASCULAR RIGHT COMMON FEMORAL PHASIC: NORMAL
BH CV LOWER VASCULAR RIGHT COMMON FEMORAL SPONT: NORMAL
BH CV LOWER VASCULAR RIGHT DISTAL FEMORAL COMPRESS: NORMAL
BH CV LOWER VASCULAR RIGHT GASTRONEMIUS COMPRESS: NORMAL
BH CV LOWER VASCULAR RIGHT GREATER SAPH AK COMPRESS: NORMAL
BH CV LOWER VASCULAR RIGHT GREATER SAPH BK COMPRESS: NORMAL
BH CV LOWER VASCULAR RIGHT LESSER SAPH COMPRESS: NORMAL
BH CV LOWER VASCULAR RIGHT MID FEMORAL AUGMENT: NORMAL
BH CV LOWER VASCULAR RIGHT MID FEMORAL COMPETENT: NORMAL
BH CV LOWER VASCULAR RIGHT MID FEMORAL COMPRESS: NORMAL
BH CV LOWER VASCULAR RIGHT MID FEMORAL PHASIC: NORMAL
BH CV LOWER VASCULAR RIGHT MID FEMORAL SPONT: NORMAL
BH CV LOWER VASCULAR RIGHT PERONEAL COMPRESS: NORMAL
BH CV LOWER VASCULAR RIGHT POPLITEAL AUGMENT: NORMAL
BH CV LOWER VASCULAR RIGHT POPLITEAL COMPETENT: NORMAL
BH CV LOWER VASCULAR RIGHT POPLITEAL COMPRESS: NORMAL
BH CV LOWER VASCULAR RIGHT POPLITEAL PHASIC: NORMAL
BH CV LOWER VASCULAR RIGHT POPLITEAL SPONT: NORMAL
BH CV LOWER VASCULAR RIGHT POSTERIOR TIBIAL COMPRESS: NORMAL
BH CV LOWER VASCULAR RIGHT PROFUNDA FEMORAL COMPRESS: NORMAL
BH CV LOWER VASCULAR RIGHT PROXIMAL FEMORAL COMPRESS: NORMAL
BH CV LOWER VASCULAR RIGHT SAPHENOFEMORAL JUNCTION COMPRESS: NORMAL

## 2020-09-01 PROCEDURE — 93971 EXTREMITY STUDY: CPT

## 2020-09-01 PROCEDURE — 99213 OFFICE O/P EST LOW 20 MIN: CPT | Performed by: NURSE PRACTITIONER

## 2020-09-01 NOTE — PROGRESS NOTES
Today spoke to MIYA Sethi providing neg preliminary results. Followed instructions to provide these results to the patient and she will receive a call from [facility] later this afternoon.

## 2020-09-01 NOTE — PROGRESS NOTES
Subjective   Claudia Price is a 44 y.o. female.     No recent travel. She has been standing more since being a preceptor. She has been having increasing right leg pain with some whooshing sensation. She has also been exercising a lot. Her leg feels heavy. She has recent diagnosis of factor V leiden. She has purchased new set of compression stockings and stopped exercising with minimal improvement in symptoms.     Lower Extremity Issue   This is a chronic problem. The current episode started more than 1 year ago. The problem has been gradually worsening. Pertinent negatives include no chest pain, coughing, fatigue, fever, joint swelling, myalgias, vertigo or visual change. Associated symptoms comments: Right calf dull pressure with intermittent whoosing. Treatments tried: compression stockings.        The following portions of the patient's history were reviewed and updated as appropriate: allergies, current medications, past family history, past medical history, past social history, past surgical history and problem list.    Review of Systems   Constitutional: Negative for activity change, appetite change, fatigue and fever.   Respiratory: Negative for cough, shortness of breath and wheezing.    Cardiovascular: Negative for chest pain.   Musculoskeletal: Negative for back pain, joint swelling and myalgias.        Right calf pain  No redness or swelling    Neurological: Negative for vertigo.       Objective   Physical Exam   Constitutional: She is oriented to person, place, and time. She appears well-developed and well-nourished.   HENT:   Head: Normocephalic.   Nose: Nose normal.   Neck: Carotid bruit is not present. No thyroid mass and no thyromegaly present.   Cardiovascular: Regular rhythm and normal heart sounds. Exam reveals no S3 and no S4.   No murmur heard.  Right calf pain (lateral aspect of leg)  Varicose veins noted bilateral leg   No edema in lower extremity  No erythema and streaking noted  (-) homans sign     Pulmonary/Chest: Effort normal and breath sounds normal. She has no decreased breath sounds. She has no wheezes. She has no rhonchi. She has no rales.   Musculoskeletal: She exhibits no edema.   Neurological: She is alert and oriented to person, place, and time. Gait normal.   Skin: Skin is warm and dry.   Psychiatric: She has a normal mood and affect. Her speech is normal and behavior is normal. Judgment and thought content normal. Cognition and memory are normal.       Assessment/Plan   Claudia was seen today for lower extremity issue.    Diagnoses and all orders for this visit:    Right calf pain  -     Duplex Venous Lower Extremity - Right CAR; Future  -     Ambulatory Referral to Vascular Surgery    Doppler negative for blood clot. Will refer to vascular. She will continue with compression stockings.        Answers for HPI/ROS submitted by the patient on 9/1/2020   What is the primary reason for your visit?: Other  Please describe your symptoms.: Right calf pain.   I have been standing more at work and thought that maybe my varicose veins were bothering me.  I purchased new compression stockings for work and wore them even at home. My calf pain in the right leg started to increase. I have been recently diagnosed with factor 5.  Have you had these symptoms before?: Yes  How long have you been having these symptoms?: 5-7 days  Please list any medications you are currently taking for this condition.: None  Please describe any probable cause for these symptoms. : Longer periods of standing at work

## 2021-04-06 ENCOUNTER — BULK ORDERING (OUTPATIENT)
Dept: CASE MANAGEMENT | Facility: OTHER | Age: 46
End: 2021-04-06

## 2021-04-06 DIAGNOSIS — Z23 IMMUNIZATION DUE: ICD-10-CM

## 2021-06-21 ENCOUNTER — TRANSCRIBE ORDERS (OUTPATIENT)
Dept: ADMINISTRATIVE | Facility: HOSPITAL | Age: 46
End: 2021-06-21

## 2021-06-21 DIAGNOSIS — Z12.31 SCREENING MAMMOGRAM FOR HIGH-RISK PATIENT: Primary | ICD-10-CM

## 2021-07-28 ENCOUNTER — HOSPITAL ENCOUNTER (OUTPATIENT)
Dept: MAMMOGRAPHY | Facility: HOSPITAL | Age: 46
Discharge: HOME | End: 2021-07-28
Admitting: SPECIALIST

## 2021-07-28 DIAGNOSIS — Z12.31 SCREENING MAMMOGRAM FOR HIGH-RISK PATIENT: ICD-10-CM

## 2021-07-28 PROCEDURE — 77063 BREAST TOMOSYNTHESIS BI: CPT

## 2021-07-28 PROCEDURE — 77067 SCR MAMMO BI INCL CAD: CPT

## 2021-10-19 ENCOUNTER — TELEMEDICINE (OUTPATIENT)
Dept: FAMILY MEDICINE CLINIC | Facility: TELEHEALTH | Age: 46
End: 2021-10-19

## 2021-10-19 DIAGNOSIS — Z20.822 SUSPECTED COVID-19 VIRUS INFECTION: Primary | ICD-10-CM

## 2021-10-19 PROCEDURE — BHEMPVIDEOVISIT: Performed by: NURSE PRACTITIONER

## 2021-10-19 RX ORDER — UBROGEPANT 100 MG/1
TABLET ORAL
COMMUNITY
Start: 2021-07-19

## 2021-10-19 NOTE — PROGRESS NOTES
You have chosen to receive care through a telehealth visit.  Do you consent to use a video/audio connection for your medical care today? Yes     CHIEF COMPLAINT  Chief Complaint   Patient presents with   • Headache   • Fever         HPI  Claudia Price is a 46 y.o. female  presents with complaint of 1 day history of chills, low-grade fever of 100, headache.     tested positive on  with his symptoms beginning on Friday.     employee    Review of Systems   Constitutional: Positive for chills and fever.   Neurological: Positive for headaches.   All other systems reviewed and are negative.      Past Medical History:   Diagnosis Date   • Degenerative disc disease at L5-S1 level 2017    MRI 2017 2 mm disk bulge   • Lumbar herniated disc 2014    MRI L4-5 mild also mild retrolisthesis   • Microscopic hematuria     cystoscopy and MRI by Dr Rees 2015   • Pregnancy            Family History   Problem Relation Age of Onset   • Kidney cancer Father 55   • Thrombophilia Father         Fastor V Leiden   • Deep vein thrombosis Father    • Stroke Maternal Grandmother    • Heart attack Paternal Grandfather    • Kidney cancer Paternal Aunt 55   • Breast cancer Maternal Aunt        Social History     Socioeconomic History   • Marital status:    Tobacco Use   • Smoking status: Never Smoker   • Smokeless tobacco: Never Used   Substance and Sexual Activity   • Alcohol use: Yes     Comment: social   • Drug use: No         There were no vitals taken for this visit.    PHYSICAL EXAM  Physical Exam   Constitutional: She is oriented to person, place, and time. She appears well-developed and well-nourished. She does not have a sickly appearance. She does not appear ill.   HENT:   Head: Normocephalic and atraumatic.   Pulmonary/Chest: Effort normal.  No respiratory distress.  Neurological: She is alert and oriented to person, place, and time.         Diagnoses and all orders for this visit:    1. Suspected  COVID-19 virus infection (Primary)  -     COVID-19,[facility] ROUTINE, NP/OP SWAB IN TRANSPORT MEDIA OR ESWAB 72 HR TAT - Swab, Nasopharynx; Future    --COVID-19 test to rule out infection  --quarantine discussed      FOLLOW-UP  As discussed during visit with PCP/[facility] if no improvement or Urgent Care/Emergency Department if worsening of symptoms    Patient verbalizes understanding of medication dosage, comfort measures, instructions for treatment and follow-up.    Candice Delaney, MIYA  10/19/2021  09:38 EDT    This visit was performed via Telehealth.  This patient has been instructed to follow-up with their primary care provider if their symptoms worsen or the treatment provided does not resolve their illness.

## 2021-10-19 NOTE — PATIENT INSTRUCTIONS
10 Things You Can Do to Manage Your COVID-19 Symptoms at Home  If you have possible or confirmed COVID-19:  1. Stay home except to get medical care.  2. Monitor your symptoms carefully. If your symptoms get worse, call your healthcare provider immediately.  3. Get rest and stay hydrated.  4. If you have a medical appointment, call the healthcare provider ahead of time and tell them that you have or may have COVID-19.  5. For medical emergencies, call 911 and notify the dispatch personnel that you have or may have COVID-19.  6. Cover your cough and sneezes with a tissue or use the inside of your elbow.  7. Wash your hands often with soap and water for at least 20 seconds or clean your hands with an alcohol-based hand  that contains at least 60% alcohol.  8. As much as possible, stay in a specific room and away from other people in your home. Also, you should use a separate bathroom, if available. If you need to be around other people in or outside of the home, wear a mask.  9. Avoid sharing personal items with other people in your household, like dishes, towels, and bedding.  10. Clean all surfaces that are touched often, like counters, tabletops, and doorknobs. Use household cleaning sprays or wipes according to the label instructions.  cdc.gov/coronavirus  07/16/2021  This information is not intended to replace advice given to you by your health care provider. Make sure you discuss any questions you have with your health care provider.  Document Revised: 08/04/2021 Document Reviewed: 08/04/2021  [employer] Patient Education © 2021 [employer]    3 Key Steps to Take While Waiting for Your COVID-19 Test Result  To help stop the spread of COVID-19, take these 3 key steps NOW while waiting for your test results:  1. Stay home and monitor your health.  Stay home and monitor your health to help protect your friends, family, and others from possibly getting COVID-19 from you.  Stay home and away from  others:  · If possible, stay away from others, especially people who are at higher risk for getting very sick from COVID-19, such as older adults and people with other medical conditions.  · If you have been in contact with someone with COVID-19, stay home and away from others for 14 days after your last contact with that person. Follow the recommendations of your local public health department if you need to quarantine.  · If you have a fever, cough or other symptoms of COVID-19, stay home and away from others (except to get medical care).  Monitor your health:  · Watch for fever, cough, shortness of breath, or other symptoms of COVID-19. Remember, symptoms may appear 2-14 days after exposure to COVID-19 and can include:  ? Fever or chills  ? Cough  ? Shortness of breath or difficulty breathing  ? Tiredness  ? Muscle or body aches  ? Headache  ? New loss of taste or smell  ? Sore throat  ? Congestion or runny nose  ? Nausea or vomiting  ? Diarrhea  2. Think about the people you have recently been around.  If you are diagnosed with COVID-19, a public health worker may call you to check on your health, discuss who you have been around, and ask where you spent time while you may have been able to spread COVID-19 to others. While you wait for your COVID-19 test result, think about everyone you have been around recently. This will be important information to give health workers if your test is positive.   Complete the information on the back of this page to help you remember everyone you have been around.   3. Answer the phone call from the health department.  If a public health worker calls you, answer the call to help slow the spread of COVID-19 in your community.  · Discussions with health department staff are confidential. This means that your personal and medical information will be kept private and only shared with those who may need to know, like your health care provider.  · Your name will not be shared with  those you came in contact with. The health department will only notify people you were in close contact with (within 6 feet for more than 15 minutes) that they might have been exposed to COVID-19.  Think about the people you have recently been around  If you test positive and are diagnosed with COVID-19, someone from the health department may call to check-in on your health, discuss who you have been around, and ask where you spent time while you may have been able to spread COVID-19 to others. This form can help you think about people you have recently been around so you will be ready if a public health worker calls you.  Things to think about. Have you:  · Gone to work or school?  · Gotten together with others (eaten out at a restaurant, gone out for drinks, exercised with others or gone to a gym, had friends or family over to your house, volunteered, gone to a party, pool, or park)?  · Gone to a store in person (e.g., grocery store, mall)?  · Gone to in-person appointments (e.g., salon, [place], doctor's or dentist's office)?  · Ridden in a car with others (e.g., rideshare) or taken public transportation?  · Been inside a Buddhist, Judaism, Tenri[employer] or other places of Rastafari?  Who lives with you?  · ______________________________________________________________________  · ______________________________________________________________________  · ______________________________________________________________________  · ______________________________________________________________________  Who have you been around (less than 6 feet for a total of 15 minutes or more) in the last 10 days? (You may have more people to list than the space provided. If so, write on the front of this sheet or a separate piece of paper.)  Name ______________________________________________  · Phone number ____________________________________  · Date you last saw them _____________________________  · Where you last saw them  ________________________________________________  Name ______________________________________________  · Phone number ____________________________________  · Date you last saw them _____________________________  · Where you last saw them ________________________________________________  Name ______________________________________________  · Phone number ____________________________________  · Date you last saw them _____________________________  · Where you last saw them ________________________________________________  Name ______________________________________________  · Phone number ____________________________________  · Date you last saw them _____________________________  · Where you last saw them ________________________________________________  Name ______________________________________________  · Phone number ____________________________________  · Date you last saw them _____________________________  · Where you last saw them ________________________________________________  What have you done in the last 10 days with other people?  Activity _____________________________________________  · Location _________________________________________  · Date ____________________________________________  Activity _____________________________________________  · Location _________________________________________  · Date ____________________________________________  Activity _____________________________________________  · Location _________________________________________  · Date ____________________________________________  Activity _____________________________________________  · Location _________________________________________  · Date ____________________________________________  Activity _____________________________________________  · Location _________________________________________  · Date ____________________________________________  Centers for Disease Control and  Prevention  cdc.gov/coronavirus  07/09/2021  This information is not intended to replace advice given to you by your health care provider. Make sure you discuss any questions you have with your health care provider.  Document Revised: 07/14/2021 Document Reviewed: 07/14/2021  [employer] Patient Education © 2021 [employer]  Symptoms of COVID-19  Watch for symptoms  People with COVID-19 have had a wide range of symptoms reported - ranging from mild symptoms to severe illness. Symptoms may appear 2-14 days after exposure to the virus. Anyone can have mild to severe symptoms. People with these symptoms may have COVID-19:  · Fever or chills  · Cough  · Shortness of breath or difficulty breathing  · Fatigue  · Muscle or body aches  · Headache  · New loss of taste or smell  · Sore throat  · Congestion or runny nose  · Nausea or vomiting  · Diarrhea  This list does not include all possible symptoms. CDC will continue to update this list as we learn more about COVID-19. Older adults and people who have severe underlying medical conditions like heart or lung disease or diabetes seem to be at higher risk for developing more serious complications from COVID-19 illness.  When to seek emergency medical attention  Look for emergency warning signs* for COVID-19. If someone is showing any of these signs, seek emergency medical care immediately:  · Trouble breathing  · Persistent pain or pressure in the chest  · New confusion  · Inability to wake or stay awake  · Pale, gray, or blue-colored skin, lips, or nail beds, depending on skin tone  *This list is not all possible symptoms. Please call your medical provider for any other symptoms that are severe or concerning to you.   Call 911 or call ahead to your local emergency facility: Notify the  that you are seeking care for someone who has or may have COVID-19.  If you are sick  · Check symptoms with a Coronavirus Self-  · Get tested  · What to do if you are  sick  · Isolate if you are sick  · When to quarantine  · How to care for someone who is sick  Difference between COVID-19 & flu  Influenza (Flu) and COVID-19 are both contagious respiratory illnesses, but they are caused by different viruses. COVID-19 is caused by infection with a new coronavirus (called SARS-CoV-2), and flu is caused by infection with influenza viruses.   COVID-19 seems to spread more easily than flu and causes more serious illnesses in some people. It can also take longer before people show symptoms and people can be contagious for longer. More information about differences between flu and COVID-19 is available in the different sections below.   Because some of the symptoms of flu and COVID-19 are similar, it may be hard to tell the difference between them based on symptoms alone, and testing may be needed to help confirm a diagnosis.  While more is learned every day about COVID-19 and the virus that causes it, there is still a lot that is unknown . This page compares COVID-19 and flu, given the best available information to date.  Centers for Disease Control and Prevention  Content source: National Center for Immunization and Respiratory Diseases (NCIRD), Division of Viral Diseases  02/22/2021  This information is not intended to replace advice given to you by your health care provider. Make sure you discuss any questions you have with your health care provider.  Document Revised: 07/14/2021 Document Reviewed: 07/14/2021  [employer] Patient Education © 2021 [employer]  How to Quarantine at Home  Information for Patients and Families    These instructions are for people with confirmed or suspected COVID-19 who do not need to be hospitalized and those with confirmed COVID-19 who were hospitalized and discharged to care for themselves at home.    If you were tested through the Health Department  The Health Department will monitor your wellbeing.  If it is determined that you do not need to be  hospitalized and can be isolated at home, you will be monitored by staff from your local or state health department.     If you were tested through a [facility]  You will need to monitor yourself and report changes in your symptoms to your doctor.  See the section below called Monitor Your Symptoms.    Follow these steps until a healthcare provider or local or state health department says you can return to your normal activities.    Stay home except to get medical care  • Restrict activities outside your home, except for getting medical care.   • Do not go to work, school, or public areas.   • Avoid using public transportation, ride-sharing, or taxis.    Separate yourself from other people and animals in your home  People  As much as possible, you should stay in a specific room and away from other people in your home. Also, you should use a separate bathroom, if available.    Animals  You should restrict contact with pets and other animals while you are sick with COVID-19, just like you would around other people. When possible, have another member of your household care for your animals while you are sick. If you are sick with COVID-19, avoid contact with your pet, including petting, snuggling, being kissed or licked, and sharing food. If you must care for your pet or be around animals while you are sick, wash your hands before and after you interact with pets and wear a facemask. See COVID-19 and Animals for more information.    Call ahead before visiting your doctor  If you have a medical appointment, call the healthcare provider and tell them that you have or may have COVID-19. This information will help the healthcare provider’s office take steps to keep other people from getting infected or exposed.    Wear a facemask  You should wear a facemask when you are around other people (e.g., sharing a room or vehicle) or pets and before you enter a healthcare provider’s office.     If you are not able to wear a  facemask (for example, because it causes trouble breathing), then people who live with you should not stay in the same room with you, or they should wear a facemask if they enter your room.    Cover your coughs and sneezes  • Cover your mouth and nose with a tissue when you cough or sneeze.   • Throw used tissues in a lined trash can.   • Immediately wash your hands with soap and water for at least 20 seconds or, if soap and water are not available, clean your hands with an alcohol-based hand  that contains at least 60% alcohol.    Clean your hands often  • Wash your hands often with soap and water for at least 20 seconds, especially after blowing your nose, coughing, or sneezing; going to the bathroom; and before eating or preparing food.     • If soap and water are not readily available, use an alcohol-based hand  with at least 60% alcohol, covering all surfaces of your hands and rubbing them together until they feel dry.    • Soap and water are the best option if hands are visibly dirty. Avoid touching your eyes, nose, and mouth with unwashed hands.    Avoid sharing personal household items  • You should not share dishes, drinking glasses, cups, eating utensils, towels, or bedding with other people or pets in your home.   • After using these items, they should be washed thoroughly with soap and water.    Clean all “high-touch” surfaces everyday  • High touch surfaces include counters, tabletops, doorknobs, bathroom fixtures, toilets, phones, keyboards, tablets, and bedside tables.   • Also, clean any surfaces that may have blood, stool, or body fluids on them.   • Use a household cleaning spray or wipe, according to the label instructions. Labels contain instructions for safe and effective use of the cleaning product, including precautions you should take when applying the product, such as wearing gloves and making sure you have good ventilation during use of the product.    Monitor your  symptoms  • Seek prompt medical attention if your illness is worsening (e.g., difficulty breathing).   • Before seeking care, call your healthcare provider and tell them that you have, or are being evaluated for, COVID-19.   • Put on a facemask before you enter the facility.     • These steps will help the healthcare provider’s office to keep other people in the office or waiting room from getting infected or exposed.   • Persons who are placed under active monitoring or facilitated self-monitoring should follow instructions provided by their local health department or occupational health professionals, as appropriate.  • If you have a medical emergency and need to call 911, notify the dispatch personnel that you have, or are being evaluated for COVID-19. If possible, put on a facemask before emergency medical services arrive.    Discontinuing home isolation  Patients with confirmed COVID-19 should remain under home isolation precautions until the risk of secondary transmission to others is thought to be low. The decision to discontinue home isolation precautions should be made on a case-by-case basis, in consultation with healthcare providers and state and local health departments.    The below content are for household members, intimate partners, and caregivers of a patient with symptomatic laboratory-confirmed COVID-19 or a patient under investigation:    Household members, intimate partners, and caregivers may have close contact with a person with symptomatic, laboratory-confirmed COVID-19 or a person under investigation.     Close contacts should monitor their health; they should call their healthcare provider right away if they develop symptoms suggestive of COVID-19 (e.g., fever, cough, shortness of breath)     Close contacts should also follow these recommendations:  • Make sure that you understand and can help the patient follow their healthcare provider’s instructions for medication(s) and care. You should  help the patient with basic needs in the home and provide support for getting groceries, prescriptions, and other personal needs.  • Monitor the patient’s symptoms. If the patient is getting sicker, call his or her healthcare provider and tell them that the patient has laboratory-confirmed COVID-19. This will help the healthcare provider’s office take steps to keep other people in the office or waiting room from getting infected. Ask the healthcare provider to call the local or [facility] health department for additional guidance. If the patient has a medical emergency and you need to call 911, notify the dispatch personnel that the patient has, or is being evaluated for COVID-19.  • Household members should stay in another room or be  from the patient as much as possible. Household members should use a separate bedroom and bathroom, if available.  • Prohibit visitors who do not have an essential need to be in the home.  • Household members should care for any pets in the home. Do not handle pets or other animals while sick.  For more information, see COVID-19 and Animals.  • Make sure that shared spaces in the home have good air flow, such as by an air conditioner or an opened window, weather permitting.  • Perform hand hygiene frequently. Wash your hands often with soap and water for at least 20 seconds or use an alcohol-based hand  that contains 60 to 95% alcohol, covering all surfaces of your hands and rubbing them together until they feel dry. Soap and water should be used preferentially if hands are visibly dirty.  • Avoid touching your eyes, nose, and mouth with unwashed hands.  • The patient should wear a facemask when you are around other people. If the patient is not able to wear a facemask (for example, because it causes trouble breathing), you, as the caregiver, should wear a mask when you are in the same room as the patient.  • Wear a disposable facemask and gloves when you touch or have  contact with the patient’s blood, stool, or body fluids, such as saliva, sputum, nasal mucus, vomit, or urine.   o Throw out disposable facemasks and gloves after using them. Do not reuse.  o When removing personal protective equipment, first remove and dispose of gloves. Then, immediately clean your hands with soap and water or alcohol-based hand . Next, remove and dispose of facemask, and immediately clean your hands again with soap and water or alcohol-based hand .  • Avoid sharing household items with the patient. You should not share dishes, drinking glasses, cups, eating utensils, towels, bedding, or other items. After the patient uses these items, you should wash them thoroughly (see below “Wash laundry thoroughly”).  • Clean all “high-touch” surfaces, such as counters, tabletops, doorknobs, bathroom fixtures, toilets, phones, keyboards, tablets, and bedside tables, every day. Also, clean any surfaces that may have blood, stool, or body fluids on them.   o Use a household cleaning spray or wipe, according to the label instructions. Labels contain instructions for safe and effective use of the cleaning product including precautions you should take when applying the product, such as wearing gloves and making sure you have good ventilation during use of the product.  • Wash laundry thoroughly.   o Immediately remove and wash clothes or bedding that have blood, stool, or body fluids on them.  o Wear disposable gloves while handling soiled items and keep soiled items away from your body. Clean your hands (with soap and water or an alcohol-based hand ) immediately after removing your gloves.  o Read and follow directions on labels of laundry or clothing items and detergent. In general, using a normal laundry detergent according to washing machine instructions and dry thoroughly using the warmest temperatures recommended on the clothing label.  • Place all used disposable gloves,  facemasks, and other contaminated items in a lined container before disposing of them with other household waste. Clean your hands (with soap and water or an alcohol-based hand ) immediately after handling these items. Soap and water should be used preferentially if hands are visibly dirty.  • Discuss any additional questions with your state or local health department or healthcare provider.    Adapted from information provided by the Centers for Disease Control and Prevention.  For more information, visit https://www.cdc.gov/coronavirus/2019-ncov/hcp/guidance-prevent-spread.html

## 2021-10-23 ENCOUNTER — HOSPITAL ENCOUNTER (EMERGENCY)
Facility: HOSPITAL | Age: 46
Discharge: HOME | End: 2021-10-23
Attending: EMERGENCY MEDICINE | Admitting: EMERGENCY MEDICINE

## 2021-10-23 ENCOUNTER — APPOINTMENT (OUTPATIENT)
Dept: GENERAL RADIOLOGY | Facility: HOSPITAL | Age: 46
End: 2021-10-23

## 2021-10-23 VITALS
TEMPERATURE: 98 F | OXYGEN SATURATION: 95 % | HEIGHT: 65 IN | RESPIRATION RATE: 18 BRPM | WEIGHT: 147 LBS | HEART RATE: 65 BPM | DIASTOLIC BLOOD PRESSURE: 65 MMHG | SYSTOLIC BLOOD PRESSURE: 103 MMHG | BODY MASS INDEX: 24.49 KG/M2

## 2021-10-23 DIAGNOSIS — U07.1 COVID-19 VIRUS INFECTION: ICD-10-CM

## 2021-10-23 DIAGNOSIS — D64.9 ANEMIA, UNSPECIFIED TYPE: ICD-10-CM

## 2021-10-23 DIAGNOSIS — R73.9 HYPERGLYCEMIA: ICD-10-CM

## 2021-10-23 DIAGNOSIS — R07.89 ATYPICAL CHEST PAIN: Primary | ICD-10-CM

## 2021-10-23 LAB
ALBUMIN SERPL-MCNC: 3.7 G/DL (ref 3.5–5.2)
ALBUMIN/GLOB SERPL: 1.5 G/DL
ALP SERPL-CCNC: 49 U/L (ref 39–117)
ALT SERPL W P-5'-P-CCNC: 14 U/L (ref 1–33)
ANION GAP SERPL CALCULATED.3IONS-SCNC: 12.8 MMOL/L (ref 5–15)
AST SERPL-CCNC: 30 U/L (ref 1–32)
BASOPHILS # BLD AUTO: 0.01 10*3/MM3 (ref 0–0.2)
BASOPHILS NFR BLD AUTO: 0.3 % (ref 0–1.5)
BILIRUB SERPL-MCNC: <0.2 MG/DL (ref 0–1.2)
BUN SERPL-MCNC: 9 MG/DL (ref 6–20)
BUN/CREAT SERPL: 13.4 (ref 7–25)
CALCIUM SPEC-SCNC: 7.9 MG/DL (ref 8.6–10.5)
CHLORIDE SERPL-SCNC: 100 MMOL/L (ref 98–107)
CO2 SERPL-SCNC: 22.2 MMOL/L (ref 22–29)
CREAT SERPL-MCNC: 0.67 MG/DL (ref 0.57–1)
D DIMER PPP FEU-MCNC: 0.38 MCGFEU/ML (ref 0–0.49)
DEPRECATED RDW RBC AUTO: 37.9 FL (ref 37–54)
EOSINOPHIL # BLD AUTO: 0 10*3/MM3 (ref 0–0.4)
EOSINOPHIL NFR BLD AUTO: 0 % (ref 0.3–6.2)
ERYTHROCYTE [DISTWIDTH] IN BLOOD BY AUTOMATED COUNT: 13 % (ref 12.3–15.4)
GFR SERPL CREATININE-BSD FRML MDRD: 94 ML/MIN/1.73
GLOBULIN UR ELPH-MCNC: 2.5 GM/DL
GLUCOSE SERPL-MCNC: 125 MG/DL (ref 65–99)
HCG SERPL QL: NEGATIVE
HCT VFR BLD AUTO: 33.5 % (ref 34–46.6)
HGB BLD-MCNC: 11.5 G/DL (ref 12–15.9)
LYMPHOCYTES # BLD AUTO: 0.49 10*3/MM3 (ref 0.7–3.1)
LYMPHOCYTES NFR BLD AUTO: 12.8 % (ref 19.6–45.3)
MCH RBC QN AUTO: 27.8 PG (ref 26.6–33)
MCHC RBC AUTO-ENTMCNC: 34.3 G/DL (ref 31.5–35.7)
MCV RBC AUTO: 80.9 FL (ref 79–97)
MONOCYTES # BLD AUTO: 0.22 10*3/MM3 (ref 0.1–0.9)
MONOCYTES NFR BLD AUTO: 5.7 % (ref 5–12)
NEUTROPHILS NFR BLD AUTO: 3.1 10*3/MM3 (ref 1.7–7)
NEUTROPHILS NFR BLD AUTO: 80.9 % (ref 42.7–76)
NT-PROBNP SERPL-MCNC: 60.7 PG/ML (ref 0–450)
PLATELET # BLD AUTO: 122 10*3/MM3 (ref 140–450)
PMV BLD AUTO: 11 FL (ref 6–12)
POTASSIUM SERPL-SCNC: 3.3 MMOL/L (ref 3.5–5.2)
PROT SERPL-MCNC: 6.2 G/DL (ref 6–8.5)
RBC # BLD AUTO: 4.14 10*6/MM3 (ref 3.77–5.28)
SODIUM SERPL-SCNC: 135 MMOL/L (ref 136–145)
TROPONIN T SERPL-MCNC: <0.01 NG/ML (ref 0–0.03)
WBC # BLD AUTO: 3.83 10*3/MM3 (ref 3.4–10.8)

## 2021-10-23 PROCEDURE — 71045 X-RAY EXAM CHEST 1 VIEW: CPT

## 2021-10-23 PROCEDURE — 99284 EMERGENCY DEPT VISIT MOD MDM: CPT

## 2021-10-23 PROCEDURE — 84484 ASSAY OF TROPONIN QUANT: CPT | Performed by: EMERGENCY MEDICINE

## 2021-10-23 PROCEDURE — 85025 COMPLETE CBC W/AUTO DIFF WBC: CPT | Performed by: EMERGENCY MEDICINE

## 2021-10-23 PROCEDURE — 93010 ELECTROCARDIOGRAM REPORT: CPT | Performed by: INTERNAL MEDICINE

## 2021-10-23 PROCEDURE — 85379 FIBRIN DEGRADATION QUANT: CPT | Performed by: EMERGENCY MEDICINE

## 2021-10-23 PROCEDURE — 93005 ELECTROCARDIOGRAM TRACING: CPT

## 2021-10-23 PROCEDURE — 83880 ASSAY OF NATRIURETIC PEPTIDE: CPT | Performed by: EMERGENCY MEDICINE

## 2021-10-23 PROCEDURE — 93005 ELECTROCARDIOGRAM TRACING: CPT | Performed by: EMERGENCY MEDICINE

## 2021-10-23 PROCEDURE — 80053 COMPREHEN METABOLIC PANEL: CPT | Performed by: EMERGENCY MEDICINE

## 2021-10-23 PROCEDURE — 84703 CHORIONIC GONADOTROPIN ASSAY: CPT | Performed by: EMERGENCY MEDICINE

## 2021-10-23 RX ORDER — ALUMINA, MAGNESIA, AND SIMETHICONE 2400; 2400; 240 MG/30ML; MG/30ML; MG/30ML
15 SUSPENSION ORAL ONCE
Status: COMPLETED | OUTPATIENT
Start: 2021-10-23 | End: 2021-10-23

## 2021-10-23 RX ORDER — BENZONATATE 200 MG/1
200 CAPSULE ORAL 3 TIMES DAILY PRN
Qty: 21 CAPSULE | Refills: 0 | Status: SHIPPED | OUTPATIENT
Start: 2021-10-23

## 2021-10-23 RX ORDER — LIDOCAINE HYDROCHLORIDE 20 MG/ML
15 SOLUTION OROPHARYNGEAL ONCE
Status: COMPLETED | OUTPATIENT
Start: 2021-10-23 | End: 2021-10-23

## 2021-10-23 RX ORDER — ASPIRIN 81 MG/1
324 TABLET, CHEWABLE ORAL ONCE
Status: COMPLETED | OUTPATIENT
Start: 2021-10-23 | End: 2021-10-23

## 2021-10-23 RX ORDER — PANTOPRAZOLE SODIUM 40 MG/1
40 TABLET, DELAYED RELEASE ORAL DAILY
Qty: 14 TABLET | Refills: 0 | Status: ON HOLD | OUTPATIENT
Start: 2021-10-23 | End: 2021-11-03 | Stop reason: SDUPTHER

## 2021-10-23 RX ORDER — FAMOTIDINE 20 MG/1
40 TABLET, FILM COATED ORAL ONCE
Status: COMPLETED | OUTPATIENT
Start: 2021-10-23 | End: 2021-10-23

## 2021-10-23 RX ORDER — POTASSIUM CHLORIDE 750 MG/1
40 TABLET, FILM COATED, EXTENDED RELEASE ORAL ONCE
Status: COMPLETED | OUTPATIENT
Start: 2021-10-23 | End: 2021-10-23

## 2021-10-23 RX ORDER — AZITHROMYCIN 250 MG/1
TABLET, FILM COATED ORAL
Qty: 6 TABLET | Refills: 0 | Status: SHIPPED | OUTPATIENT
Start: 2021-10-23 | End: 2021-11-08 | Stop reason: HOSPADM

## 2021-10-23 RX ADMIN — ASPIRIN 324 MG: 81 TABLET, CHEWABLE ORAL at 17:46

## 2021-10-23 RX ADMIN — FAMOTIDINE 40 MG: 20 TABLET, FILM COATED ORAL at 17:47

## 2021-10-23 RX ADMIN — POTASSIUM CHLORIDE 40 MEQ: 750 TABLET, EXTENDED RELEASE ORAL at 19:18

## 2021-10-23 RX ADMIN — MAGNESIUM HYDROXIDE,ALUMINUM HYDROXICE,SIMETHICONE 15 ML: 240; 2400; 2400 SUSPENSION ORAL at 17:48

## 2021-10-23 RX ADMIN — LIDOCAINE HYDROCHLORIDE 15 ML: 20 SOLUTION ORAL; TOPICAL at 17:48

## 2021-10-23 NOTE — ED TRIAGE NOTES
Pt had positive covid test Wednesday. Pt having increased chest pain w/ burning sensation. Reports good sats at home.     Patient was placed in face mask in first look. Patient was wearing facemask when I entered the room and throughout our encounter. I wore protective equipment throughout this patient encounter including a capr and gown. Hand hygiene was performed before donning protective equipment and after removal when leaving the room.

## 2021-10-23 NOTE — ED PROVIDER NOTES
EMERGENCY DEPARTMENT ENCOUNTER  Patient was placed in face mask in first look and the following protective measures were taken unless  documented below in the ED course. Patient was wearing facemask when I entered the room and throughout our encounter. I wore full protective equipment throughout this patient encounter including a N95 mask, eye shield, gown and gloves. Hand hygiene was performed before donning protective equipment and after removal when leaving the room.    Room Number:  12/12  Date of encounter:  10/23/2021  PCP: Malika Magana CRNP    HPI:  Context: Claudia Price is a 46 y.o. female who presents to the ED c/o chief complaint of chest pain. Patient reports that she can feel ill on Monday, diagnosed with Covid on Tuesday. Patient was not vaccinated. Patient denies any chronic medical problems other than asthma but reports has not been an issue for prolonged period time. Medical history reviewed does show patient has a history of hereditary thrombophilia, heterozygous factor V Leiden mutation. Patient reports that she began having burning chest pain well localized in the inferior portion of her sternum that has been constant since 9 this morning. Pain is well localized, does not radiate, no aggravating or ameliorating features. Pain is not exertional, not pleuritic in nature. Patient denies any shortness of breath, no nausea vomiting, no diaphoresis. Patient has had runny nose although no congestion, denies any loss of sense of smell or taste but reports that her smell and taste are altered. Patient reports productive cough, no shortness of breath, no vomiting or diarrhea. Patient denies any abdominal pain, eating drinking normally. Patient reports T-max of 103 by oral thermometer yesterday, responsive to Tylenol and ibuprofen. Patient denies any cardiac history, no history of hypertension hyperlipidemia or diabetes, non-smoker. Patient denies any hemoptysis, no unilateral leg swelling, no  recent period of immobilization, no major trauma or recent surgery.    MEDICAL HISTORY REVIEW  Reviewed in EPIC    PAST MEDICAL HISTORY  Active Ambulatory Problems     Diagnosis Date Noted   • Hemorrhagic cyst of ovary 01/18/2017   • Degeneration of intervertebral disc of lumbar region 01/18/2017   • Submucous leiomyoma of uterus 04/13/2016   • Chronic bilateral low back pain 01/18/2017   • Health care maintenance 01/18/2017   • Intramural leiomyoma of uterus 02/03/2017   • Cold-induced asthma without complication 03/01/2017   • Raynaud phenomenon 03/01/2017   • Hereditary thrombophilia ([facility]) 05/17/2018   • H/O myomectomy 11/22/2017   • Heterozygous factor V Leiden mutation ([facility]) 06/06/2018   • Factor 5 Leiden mutation, heterozygous ([facility]) 07/01/2019     Resolved Ambulatory Problems     Diagnosis Date Noted   • Degenerative disc disease at L5-S1 level 02/02/2017   • New daily persistent headache 07/27/2017     Past Medical History:   Diagnosis Date   • Lumbar herniated disc 04/2014   • Microscopic hematuria    • Pregnancy        PAST SURGICAL HISTORY  Past Surgical History:   Procedure Laterality Date   • HYSTEROSCOPY      04/2016   • MYOMECTOMY ABDOMINAL APPROACH  05/2017    Laparoscopic       FAMILY HISTORY  Family History   Problem Relation Age of Onset   • Kidney cancer Father 55   • Thrombophilia Father         Fastor V Leiden   • Deep vein thrombosis Father    • Stroke Maternal Grandmother    • Heart attack Paternal Grandfather    • Kidney cancer Paternal Aunt 55   • Breast cancer Maternal Aunt        SOCIAL HISTORY  Social History     Socioeconomic History   • Marital status:    Tobacco Use   • Smoking status: Never Smoker   • Smokeless tobacco: Never Used   Substance and Sexual Activity   • Alcohol use: Yes     Comment: social   • Drug use: No       ALLERGIES  Pseudoephedrine and Nedocromil    The patient's allergies have been reviewed    REVIEW OF SYSTEMS  All systems reviewed and negative except for  those discussed in HPI.     PHYSICAL EXAM  I have reviewed the triage vital signs and nursing notes.  ED Triage Vitals [10/23/21 1704]   Temp Heart Rate Resp BP SpO2   98 °F (36.7 °C) 96 -- -- 96 %      Temp src Heart Rate Source Patient Position BP Location FiO2 (%)   -- -- -- -- --       General: No acute distress, well-appearing, speaking full sentences, maintaining oxygen saturation on room air without difficulty  HENT: NCAT, PERRL, Nares patent  Eyes: no scleral icterus  Neck: trachea midline, no ROM limitations  CV: regular rhythm, regular rate  Respiratory: normal effort, CTAB, no wheezing rales or rhonchi  Abdomen: soft, nondistended, nontender to palpation, no rebound tenderness, no guarding or rigidity  : deferred  Musculoskeletal: no deformity  Neuro: alert, moves all extremities, follows commands  Skin: warm, dry, no peripheral edema. Bilateral lower extremities: No edema, no redness warmth or skin changes, no palpable cords, negative Homans.    LAB RESULTS  Recent Results (from the past 24 hour(s))   ECG 12 Lead    Collection Time: 10/23/21  5:12 PM   Result Value Ref Range    QT Interval 361 ms   Comprehensive Metabolic Panel    Collection Time: 10/23/21  5:44 PM    Specimen: Blood   Result Value Ref Range    Glucose 125 (H) 65 - 99 mg/dL    BUN 9 6 - 20 mg/dL    Creatinine 0.67 0.57 - 1.00 mg/dL    Sodium 135 (L) 136 - 145 mmol/L    Potassium 3.3 (L) 3.5 - 5.2 mmol/L    Chloride 100 98 - 107 mmol/L    CO2 22.2 22.0 - 29.0 mmol/L    Calcium 7.9 (L) 8.6 - 10.5 mg/dL    Total Protein 6.2 6.0 - 8.5 g/dL    Albumin 3.70 3.50 - 5.20 g/dL    ALT (SGPT) 14 1 - 33 U/L    AST (SGOT) 30 1 - 32 U/L    Alkaline Phosphatase 49 39 - 117 U/L    Total Bilirubin <0.2 0.0 - 1.2 mg/dL    eGFR Non African Amer 94 >60 mL/min/1.73    Globulin 2.5 gm/dL    A/G Ratio 1.5 g/dL    BUN/Creatinine Ratio 13.4 7.0 - 25.0    Anion Gap 12.8 5.0 - 15.0 mmol/L   hCG, Serum, Qualitative    Collection Time: 10/23/21  5:44 PM     Specimen: Blood   Result Value Ref Range    HCG Qualitative Negative Negative   Troponin    Collection Time: 10/23/21  5:44 PM    Specimen: Blood   Result Value Ref Range    Troponin T <0.010 0.000 - 0.030 ng/mL   BNP    Collection Time: 10/23/21  5:44 PM    Specimen: Blood   Result Value Ref Range    proBNP 60.7 0.0 - 450.0 pg/mL   D-dimer, Quantitative    Collection Time: 10/23/21  5:44 PM    Specimen: Blood   Result Value Ref Range    D-Dimer, Quantitative 0.38 0.00 - 0.49 MCGFEU/mL   CBC Auto Differential    Collection Time: 10/23/21  5:44 PM    Specimen: Blood   Result Value Ref Range    WBC 3.83 3.40 - 10.80 10*3/mm3    RBC 4.14 3.77 - 5.28 10*6/mm3    Hemoglobin 11.5 (L) 12.0 - 15.9 g/dL    Hematocrit 33.5 (L) 34.0 - 46.6 %    MCV 80.9 79.0 - 97.0 fL    MCH 27.8 26.6 - 33.0 pg    MCHC 34.3 31.5 - 35.7 g/dL    RDW 13.0 12.3 - 15.4 %    RDW-SD 37.9 37.0 - 54.0 fl    MPV 11.0 6.0 - 12.0 fL    Platelets 122 (L) 140 - 450 10*3/mm3    Neutrophil % 80.9 (H) 42.7 - 76.0 %    Lymphocyte % 12.8 (L) 19.6 - 45.3 %    Monocyte % 5.7 5.0 - 12.0 %    Eosinophil % 0.0 (L) 0.3 - 6.2 %    Basophil % 0.3 0.0 - 1.5 %    Neutrophils, Absolute 3.10 1.70 - 7.00 10*3/mm3    Lymphocytes, Absolute 0.49 (L) 0.70 - 3.10 10*3/mm3    Monocytes, Absolute 0.22 0.10 - 0.90 10*3/mm3    Eosinophils, Absolute 0.00 0.00 - 0.40 10*3/mm3    Basophils, Absolute 0.01 0.00 - 0.20 10*3/mm3       I ordered the above labs and reviewed the results.    RADIOLOGY  XR Chest 1 View    Result Date: 10/23/2021  XR CHEST 1 VW-  10/23/2021  HISTORY: Chest pain.  Heart size is at the upper limits of normal. There are some minimal ill-defined increased density in the lateral aspect of the right lower lung which could represent some mild atelectasis or infiltrate. Left lung appears clear.  Bony structures appear unremarkable.      Minimal increased density in the lateral aspect of the right lower lung could represent some minimal atelectasis and/or pneumonia.  FINDINGS could represent mild changes of Covid 19 pneumonia.  This report was finalized on 10/23/2021 6:17 PM by Dr. Devon Cantor M.D.        I ordered the above noted radiological studies. I reviewed the images and results. I agree with the radiologist interpretation.    PROCEDURES  Procedures    MEDICATIONS GIVEN IN ER  Medications   potassium chloride (K-DUR,KLOR-CON) ER tablet 40 mEq (has no administration in time range)   aspirin chewable tablet 324 mg (324 mg Oral Given 10/23/21 1746)   aluminum-magnesium hydroxide-simethicone (MAALOX MAX) 400-400-40 MG/5ML suspension 15 mL (15 mL Oral Given 10/23/21 1748)   Lidocaine Viscous HCl (XYLOCAINE) 2 % solution 15 mL (15 mL Mouth/Throat Given 10/23/21 1748)   famotidine (PEPCID) tablet 40 mg (40 mg Oral Given 10/23/21 1747)       PROGRESS, DATA ANALYSIS, CONSULTS, AND MEDICAL DECISION MAKING  A complete history and physical exam have been performed.  All available laboratory and imaging results have been reviewed by myself prior to disposition.    MDM  After the initial H&P, I discussed pertinent information from history and physical exam with patient/family.  Discussed differential diagnosis.  Discussed plan for ED evaluation/work-up/treatment.  All questions answered.  Patient/family is agreeable with plan.  ED Course as of 10/23/21 1908   Sat Oct 23, 2021   1717 EKG independently viewed and contemporaneously interpreted by ED physician. Time: 1712. Rate 81. Interpretation: Normal sinus rhythm, normal axis, normal QRS, no acute ST changes. [JG]   1717 My differential diagnosis for chest pain includes but is not limited to:  Muscle strain, costochondritis, myositis, pleurisy, rib fracture, intercostal neuritis, herpes zoster, tumor, myocardial infarction, coronary syndrome, unstable angina, angina, aortic dissection, mitral valve prolapse, pericarditis, palpitations, pulmonary embolus, pneumonia, pneumothorax, lung cancer, GERD, esophagitis, esophageal  spasm     [JG]   1735 Wells low risk, but given patient is Covid positive and history of factor V Leiden mutation, ordering dimer. [JG]   1828 Dimer negative, PE ruled out [JG]   1838 HEART SCORE:    History #0  (Highly suspicious 2, Moderately suspicious 1, Slightly or non-suspicious 0)    ECG #0  (Significant ST depression 2,  Nonspecific repol disturbance 1, Normal 0)    Age #0  (> or = 65 2, 46-65 1,  < or = 45 0)    Risk factors #0  (hypercholesterolemia, HTN, DM, smoking, pos fam hx, obesity)  (> or = to 3 RF 2, 1 or 2 1, No risk factors 0)    Troponin #0  (> or = 3x normal limit 2, 1-3x normal limit 1, < or = Normal limit 0)    HEART Score Key:  Scores 0-3: 0.9-1.7% risk of adverse cardiac event. In the HEART Score study, these patients were discharged (0.99% in the retrospective study, 1.7% in the prospective study)  Scores 4-6: 12-16.6% risk of adverse cardiac event. In the HEART Score study, these patients were admitted to the hospital. (11.6% retrospective, 16.6% prospective)  Scores =7: 50-65% risk of adverse cardiac event. In the HEART Score study, these patients were candidates for early invasive measures. (65.2% retrospective, 50.1% prospective)      This patient's HEART score is 0     [JG]   1838 Hypokalemia, repleting [JG]   1905 Lab work unremarkable other than mild anemia, just over and under normal, mild hypokalemia, repleted, mild hyperglycemia, no signs of DKA, no signs of dehydration, no anion gap.  Will cover with azithromycin for possible bacterial pneumonia although likely infiltrate seen on x-ray is just secondary to known Covid infection.  Patient is well-appearing, no dyspnea, speaking full sentences, maintaining oxygen saturation without difficulty.  Heart score low risk with negative cardiac work-up, PE ruled out.  Patient appears appropriate for discharge with outpatient follow-up.  Given extensive discussion return precautions, discharging. [JG]   1905 The patient was reexamined.   They have had symptomatic improvement during their ED stay.  I discussed today's findings with the patient, explaining the pertinent positives and negatives from today's visit, and the plan of care.  Discussed plan for discharge as there is no emergent indication for admission.  Discussed limitation of the ED work-up and that this is to rule out life-threatening emergencies but that they could require further testing as determined by their primary care and or any referred specialist patient is agreeable and understands need for follow-up and repeat exam/testing.  Patient is aware that discharge does not mean there is nothing wrong, indicates no emergency is present, and that they must continue their care with their primary care physician and/or any referred specialist.  They were given appropriate follow-up with their primary care physician and/or specialist.  I had an extensive discussion on the expected clinical course and return precautions.  Patient understands to return to the emergency department for continuation, worsening, or new symptoms.  I answered any of the patient's questions. Patient was discharged home in a stable condition.     [JG]      ED Course User Index  [JG] Daniel Thomas MD       AS OF 19:08 EDT VITALS:    BP - 103/65  HR - 96  TEMP - 98 °F (36.7 °C)  O2 SATS - 96%    DIAGNOSIS  Final diagnoses:   Atypical chest pain   COVID-19 virus infection   Anemia, unspecified type   Hyperglycemia         DISPOSITION  DISCHARGE    Patient discharged in stable condition.    Reviewed implications of results, diagnosis, meds, responsibility to follow up, warning signs and symptoms of possible worsening, potential complications and reasons to return to ER.    Patient/Family voiced understanding of above instructions.    Discussed plan for discharge, as there is no emergent indication for admission. Patient referred to primary care provider for BP management due to today's BP. Pt/family is agreeable and  understands need for follow up and repeat testing.  Pt is aware that discharge does not mean that nothing is wrong but it indicates no emergency is present that requires admission and they must continue care with follow-up as given below or physician of their choice.     FOLLOW-UP  Malika Magana CRNP  [address]  [facility] [id number] [phone]    Schedule an appointment as soon as possible for a visit in 2 days      [facility] Emergency Department  [address] [facility] [place] [address] [phone]  Go to   as needed         Medication List      New Prescriptions    azithromycin 250 MG tablet  Commonly known as: Zithromax Z-Joshua  Take 2 tablets the first day, then 1 tablet daily for 4 days.     benzonatate 200 MG capsule  Commonly known as: TESSALON  Take 1 capsule by mouth 3 (Three) Times a Day As Needed for Cough.     pantoprazole 40 MG EC tablet  Commonly known as: PROTONIX  Take 1 tablet by mouth Daily for 14 days.           Where to Get Your Medications      These medications were sent to [id number] - [place], [place] - [address] [place] [address] AT SEC OF [address] - [phone]  - [phone] [address] [address]    Phone: [phone]   · azithromycin 250 MG tablet  · benzonatate 200 MG capsule  · pantoprazole 40 MG EC tablet          Daniel Thomas MD  10/23/21 7027

## 2021-10-24 LAB — QT INTERVAL: 361 MS

## 2021-10-25 ENCOUNTER — HOSPITAL ENCOUNTER (INPATIENT)
Facility: HOSPITAL | Age: 46
LOS: 14 days | Discharge: HOME | End: 2021-11-08
Attending: EMERGENCY MEDICINE | Admitting: STUDENT IN AN ORGANIZED HEALTH CARE EDUCATION/TRAINING PROGRAM

## 2021-10-25 ENCOUNTER — APPOINTMENT (OUTPATIENT)
Dept: GENERAL RADIOLOGY | Facility: HOSPITAL | Age: 46
End: 2021-10-25

## 2021-10-25 DIAGNOSIS — J12.82 PNEUMONIA DUE TO COVID-19 VIRUS: Primary | ICD-10-CM

## 2021-10-25 DIAGNOSIS — G89.29 CHRONIC BILATERAL LOW BACK PAIN WITH SCIATICA, SCIATICA LATERALITY UNSPECIFIED: ICD-10-CM

## 2021-10-25 DIAGNOSIS — U07.1 PNEUMONIA DUE TO COVID-19 VIRUS: Primary | ICD-10-CM

## 2021-10-25 DIAGNOSIS — F41.1 GAD (GENERALIZED ANXIETY DISORDER): ICD-10-CM

## 2021-10-25 DIAGNOSIS — G89.29 CHRONIC BILATERAL LOW BACK PAIN WITHOUT SCIATICA: ICD-10-CM

## 2021-10-25 DIAGNOSIS — M54.40 CHRONIC BILATERAL LOW BACK PAIN WITH SCIATICA, SCIATICA LATERALITY UNSPECIFIED: ICD-10-CM

## 2021-10-25 DIAGNOSIS — R09.02 HYPOXIA: ICD-10-CM

## 2021-10-25 DIAGNOSIS — M54.50 CHRONIC BILATERAL LOW BACK PAIN WITHOUT SCIATICA: ICD-10-CM

## 2021-10-25 LAB
ALBUMIN SERPL-MCNC: 3.8 G/DL (ref 3.5–5.2)
ALBUMIN/GLOB SERPL: 1.3 G/DL
ALP SERPL-CCNC: 53 U/L (ref 39–117)
ALT SERPL W P-5'-P-CCNC: 26 U/L (ref 1–33)
ANION GAP SERPL CALCULATED.3IONS-SCNC: 13.4 MMOL/L (ref 5–15)
AST SERPL-CCNC: 47 U/L (ref 1–32)
BASOPHILS # BLD AUTO: 0.01 10*3/MM3 (ref 0–0.2)
BASOPHILS NFR BLD AUTO: 0.3 % (ref 0–1.5)
BILIRUB SERPL-MCNC: 0.2 MG/DL (ref 0–1.2)
BUN SERPL-MCNC: 12 MG/DL (ref 6–20)
BUN/CREAT SERPL: 16.7 (ref 7–25)
CALCIUM SPEC-SCNC: 8.7 MG/DL (ref 8.6–10.5)
CHLORIDE SERPL-SCNC: 104 MMOL/L (ref 98–107)
CO2 SERPL-SCNC: 22.6 MMOL/L (ref 22–29)
CREAT SERPL-MCNC: 0.72 MG/DL (ref 0.57–1)
DEPRECATED RDW RBC AUTO: 37.5 FL (ref 37–54)
EOSINOPHIL # BLD AUTO: 0 10*3/MM3 (ref 0–0.4)
EOSINOPHIL NFR BLD AUTO: 0 % (ref 0.3–6.2)
ERYTHROCYTE [DISTWIDTH] IN BLOOD BY AUTOMATED COUNT: 12.4 % (ref 12.3–15.4)
GFR SERPL CREATININE-BSD FRML MDRD: 87 ML/MIN/1.73
GLOBULIN UR ELPH-MCNC: 3 GM/DL
GLUCOSE SERPL-MCNC: 99 MG/DL (ref 65–99)
HCT VFR BLD AUTO: 36.3 % (ref 34–46.6)
HGB BLD-MCNC: 12.3 G/DL (ref 12–15.9)
HOLD SPECIMEN: NORMAL
HOLD SPECIMEN: NORMAL
IMM GRANULOCYTES # BLD AUTO: 0.01 10*3/MM3 (ref 0–0.05)
IMM GRANULOCYTES NFR BLD AUTO: 0.3 % (ref 0–0.5)
LYMPHOCYTES # BLD AUTO: 0.5 10*3/MM3 (ref 0.7–3.1)
LYMPHOCYTES NFR BLD AUTO: 14.7 % (ref 19.6–45.3)
MCH RBC QN AUTO: 27.9 PG (ref 26.6–33)
MCHC RBC AUTO-ENTMCNC: 33.9 G/DL (ref 31.5–35.7)
MCV RBC AUTO: 82.3 FL (ref 79–97)
MONOCYTES # BLD AUTO: 0.25 10*3/MM3 (ref 0.1–0.9)
MONOCYTES NFR BLD AUTO: 7.3 % (ref 5–12)
NEUTROPHILS NFR BLD AUTO: 2.64 10*3/MM3 (ref 1.7–7)
NEUTROPHILS NFR BLD AUTO: 77.4 % (ref 42.7–76)
NRBC BLD AUTO-RTO: 0 /100 WBC (ref 0–0.2)
PLATELET # BLD AUTO: 136 10*3/MM3 (ref 140–450)
PMV BLD AUTO: 11 FL (ref 6–12)
POTASSIUM SERPL-SCNC: 3.6 MMOL/L (ref 3.5–5.2)
PROCALCITONIN SERPL-MCNC: 0.06 NG/ML (ref 0–0.25)
PROT SERPL-MCNC: 6.8 G/DL (ref 6–8.5)
RBC # BLD AUTO: 4.41 10*6/MM3 (ref 3.77–5.28)
SODIUM SERPL-SCNC: 140 MMOL/L (ref 136–145)
WBC # BLD AUTO: 3.41 10*3/MM3 (ref 3.4–10.8)
WHOLE BLOOD HOLD SPECIMEN: NORMAL
WHOLE BLOOD HOLD SPECIMEN: NORMAL

## 2021-10-25 PROCEDURE — XW033E5 INTRODUCTION OF REMDESIVIR ANTI-INFECTIVE INTO PERIPHERAL VEIN, PERCUTANEOUS APPROACH, NEW TECHNOLOGY GROUP 5: ICD-10-PCS | Performed by: INTERNAL MEDICINE

## 2021-10-25 PROCEDURE — 71045 X-RAY EXAM CHEST 1 VIEW: CPT

## 2021-10-25 PROCEDURE — 25010000002 DEXAMETHASONE PER 1 MG: Performed by: NURSE PRACTITIONER

## 2021-10-25 PROCEDURE — 25010000002 DEXAMETHASONE PER 1 MG: Performed by: INTERNAL MEDICINE

## 2021-10-25 PROCEDURE — 84145 PROCALCITONIN (PCT): CPT | Performed by: NURSE PRACTITIONER

## 2021-10-25 PROCEDURE — 85025 COMPLETE CBC W/AUTO DIFF WBC: CPT | Performed by: NURSE PRACTITIONER

## 2021-10-25 PROCEDURE — 80053 COMPREHEN METABOLIC PANEL: CPT | Performed by: NURSE PRACTITIONER

## 2021-10-25 PROCEDURE — 25010000002 ENOXAPARIN PER 10 MG: Performed by: INTERNAL MEDICINE

## 2021-10-25 PROCEDURE — 99284 EMERGENCY DEPT VISIT MOD MDM: CPT

## 2021-10-25 RX ORDER — HYDROCODONE BITARTRATE AND ACETAMINOPHEN 5; 325 MG/1; MG/1
1 TABLET ORAL EVERY 6 HOURS PRN
Status: DISCONTINUED | OUTPATIENT
Start: 2021-10-25 | End: 2021-10-26

## 2021-10-25 RX ORDER — DEXAMETHASONE SODIUM PHOSPHATE 10 MG/ML
6 INJECTION INTRAMUSCULAR; INTRAVENOUS EVERY 8 HOURS
Status: DISCONTINUED | OUTPATIENT
Start: 2021-10-25 | End: 2021-10-26

## 2021-10-25 RX ORDER — FLUTICASONE PROPIONATE 50 MCG
1 SPRAY, SUSPENSION (ML) NASAL DAILY
Status: DISCONTINUED | OUTPATIENT
Start: 2021-10-26 | End: 2021-11-08 | Stop reason: HOSPADM

## 2021-10-25 RX ORDER — PANTOPRAZOLE SODIUM 40 MG/1
40 TABLET, DELAYED RELEASE ORAL
Status: DISCONTINUED | OUTPATIENT
Start: 2021-10-26 | End: 2021-11-08 | Stop reason: HOSPADM

## 2021-10-25 RX ORDER — CYCLOBENZAPRINE HCL 10 MG
10 TABLET ORAL 3 TIMES DAILY
Status: DISCONTINUED | OUTPATIENT
Start: 2021-10-25 | End: 2021-10-26

## 2021-10-25 RX ORDER — ACETAMINOPHEN 325 MG/1
650 TABLET ORAL EVERY 6 HOURS PRN
Status: DISCONTINUED | OUTPATIENT
Start: 2021-10-25 | End: 2021-11-08 | Stop reason: HOSPADM

## 2021-10-25 RX ORDER — DEXAMETHASONE SODIUM PHOSPHATE 10 MG/ML
6 INJECTION INTRAMUSCULAR; INTRAVENOUS ONCE
Status: COMPLETED | OUTPATIENT
Start: 2021-10-25 | End: 2021-10-25

## 2021-10-25 RX ORDER — CETIRIZINE HYDROCHLORIDE 10 MG/1
10 TABLET ORAL DAILY
Status: DISCONTINUED | OUTPATIENT
Start: 2021-10-25 | End: 2021-11-08 | Stop reason: HOSPADM

## 2021-10-25 RX ORDER — BENZONATATE 100 MG/1
200 CAPSULE ORAL 3 TIMES DAILY PRN
Status: DISCONTINUED | OUTPATIENT
Start: 2021-10-25 | End: 2021-11-08 | Stop reason: HOSPADM

## 2021-10-25 RX ORDER — NITROGLYCERIN 0.4 MG/1
0.4 TABLET SUBLINGUAL
Status: DISCONTINUED | OUTPATIENT
Start: 2021-10-25 | End: 2021-11-08 | Stop reason: HOSPADM

## 2021-10-25 RX ORDER — SODIUM CHLORIDE 0.9 % (FLUSH) 0.9 %
10 SYRINGE (ML) INJECTION AS NEEDED
Status: DISCONTINUED | OUTPATIENT
Start: 2021-10-25 | End: 2021-11-08 | Stop reason: HOSPADM

## 2021-10-25 RX ADMIN — CYCLOBENZAPRINE 10 MG: 10 TABLET, FILM COATED ORAL at 22:57

## 2021-10-25 RX ADMIN — DEXAMETHASONE SODIUM PHOSPHATE 6 MG: 10 INJECTION INTRAMUSCULAR; INTRAVENOUS at 22:57

## 2021-10-25 RX ADMIN — DEXAMETHASONE SODIUM PHOSPHATE 6 MG: 10 INJECTION INTRAMUSCULAR; INTRAVENOUS at 13:54

## 2021-10-25 RX ADMIN — REMDESIVIR 200 MG: 100 INJECTION, POWDER, LYOPHILIZED, FOR SOLUTION INTRAVENOUS at 22:58

## 2021-10-25 RX ADMIN — CETIRIZINE HYDROCHLORIDE 10 MG: 10 TABLET ORAL at 20:29

## 2021-10-25 RX ADMIN — ACETAMINOPHEN 650 MG: 325 TABLET, FILM COATED ORAL at 20:29

## 2021-10-25 RX ADMIN — HYDROCODONE BITARTRATE AND ACETAMINOPHEN 1 TABLET: 5; 325 TABLET ORAL at 20:29

## 2021-10-25 RX ADMIN — ENOXAPARIN SODIUM 40 MG: 40 INJECTION SUBCUTANEOUS at 20:29

## 2021-10-26 LAB
ALBUMIN SERPL-MCNC: 4 G/DL (ref 3.5–5.2)
ALBUMIN/GLOB SERPL: 1.4 G/DL
ALP SERPL-CCNC: 55 U/L (ref 39–117)
ALT SERPL W P-5'-P-CCNC: 31 U/L (ref 1–33)
ANION GAP SERPL CALCULATED.3IONS-SCNC: 16.5 MMOL/L (ref 5–15)
AST SERPL-CCNC: 49 U/L (ref 1–32)
BACTERIA UR QL AUTO: NORMAL /HPF
BASOPHILS # BLD AUTO: 0 10*3/MM3 (ref 0–0.2)
BASOPHILS NFR BLD AUTO: 0 % (ref 0–1.5)
BILIRUB CONJ SERPL-MCNC: <0.2 MG/DL (ref 0–0.3)
BILIRUB SERPL-MCNC: 0.2 MG/DL (ref 0–1.2)
BILIRUB UR QL STRIP: NEGATIVE
BUN SERPL-MCNC: 12 MG/DL (ref 6–20)
BUN/CREAT SERPL: 22.2 (ref 7–25)
CALCIUM SPEC-SCNC: 8.8 MG/DL (ref 8.6–10.5)
CHLORIDE SERPL-SCNC: 107 MMOL/L (ref 98–107)
CLARITY UR: CLEAR
CO2 SERPL-SCNC: 19.5 MMOL/L (ref 22–29)
COLOR UR: YELLOW
CREAT SERPL-MCNC: 0.54 MG/DL (ref 0.57–1)
CRP SERPL-MCNC: 2.71 MG/DL (ref 0–0.5)
D DIMER PPP FEU-MCNC: 0.3 MCGFEU/ML (ref 0–0.49)
DEPRECATED RDW RBC AUTO: 36.5 FL (ref 37–54)
EOSINOPHIL # BLD AUTO: 0 10*3/MM3 (ref 0–0.4)
EOSINOPHIL NFR BLD AUTO: 0 % (ref 0.3–6.2)
ERYTHROCYTE [DISTWIDTH] IN BLOOD BY AUTOMATED COUNT: 12.4 % (ref 12.3–15.4)
GFR SERPL CREATININE-BSD FRML MDRD: 121 ML/MIN/1.73
GLOBULIN UR ELPH-MCNC: 2.9 GM/DL
GLUCOSE SERPL-MCNC: 127 MG/DL (ref 65–99)
GLUCOSE UR STRIP-MCNC: NEGATIVE MG/DL
HCT VFR BLD AUTO: 36.5 % (ref 34–46.6)
HGB BLD-MCNC: 12.4 G/DL (ref 12–15.9)
HGB UR QL STRIP.AUTO: ABNORMAL
HYALINE CASTS UR QL AUTO: NORMAL /LPF
IMM GRANULOCYTES # BLD AUTO: 0 10*3/MM3 (ref 0–0.05)
IMM GRANULOCYTES NFR BLD AUTO: 0 % (ref 0–0.5)
KETONES UR QL STRIP: NEGATIVE
LEUKOCYTE ESTERASE UR QL STRIP.AUTO: NEGATIVE
LYMPHOCYTES # BLD AUTO: 0.37 10*3/MM3 (ref 0.7–3.1)
LYMPHOCYTES NFR BLD AUTO: 18.6 % (ref 19.6–45.3)
MCH RBC QN AUTO: 27.6 PG (ref 26.6–33)
MCHC RBC AUTO-ENTMCNC: 34 G/DL (ref 31.5–35.7)
MCV RBC AUTO: 81.3 FL (ref 79–97)
MONOCYTES # BLD AUTO: 0.16 10*3/MM3 (ref 0.1–0.9)
MONOCYTES NFR BLD AUTO: 8 % (ref 5–12)
NEUTROPHILS NFR BLD AUTO: 1.46 10*3/MM3 (ref 1.7–7)
NEUTROPHILS NFR BLD AUTO: 73.4 % (ref 42.7–76)
NITRITE UR QL STRIP: NEGATIVE
NRBC BLD AUTO-RTO: 0 /100 WBC (ref 0–0.2)
PH UR STRIP.AUTO: 7 [PH] (ref 5–8)
PLATELET # BLD AUTO: 150 10*3/MM3 (ref 140–450)
PMV BLD AUTO: 11.2 FL (ref 6–12)
POTASSIUM SERPL-SCNC: 4.2 MMOL/L (ref 3.5–5.2)
PROCALCITONIN SERPL-MCNC: 0.05 NG/ML (ref 0–0.25)
PROT SERPL-MCNC: 6.9 G/DL (ref 6–8.5)
PROT UR QL STRIP: NEGATIVE
RBC # BLD AUTO: 4.49 10*6/MM3 (ref 3.77–5.28)
RBC # UR: NORMAL /HPF
REF LAB TEST METHOD: NORMAL
SODIUM SERPL-SCNC: 143 MMOL/L (ref 136–145)
SP GR UR STRIP: 1.01 (ref 1–1.03)
SQUAMOUS #/AREA URNS HPF: NORMAL /HPF
UROBILINOGEN UR QL STRIP: ABNORMAL
WBC # BLD AUTO: 1.99 10*3/MM3 (ref 3.4–10.8)
WBC UR QL AUTO: NORMAL /HPF

## 2021-10-26 PROCEDURE — 81001 URINALYSIS AUTO W/SCOPE: CPT | Performed by: INTERNAL MEDICINE

## 2021-10-26 PROCEDURE — 25010000002 ENOXAPARIN PER 10 MG: Performed by: INTERNAL MEDICINE

## 2021-10-26 PROCEDURE — 25010000002 DEXAMETHASONE PER 1 MG: Performed by: INTERNAL MEDICINE

## 2021-10-26 PROCEDURE — 87040 BLOOD CULTURE FOR BACTERIA: CPT | Performed by: INTERNAL MEDICINE

## 2021-10-26 PROCEDURE — 85025 COMPLETE CBC W/AUTO DIFF WBC: CPT | Performed by: INTERNAL MEDICINE

## 2021-10-26 PROCEDURE — 36415 COLL VENOUS BLD VENIPUNCTURE: CPT | Performed by: INTERNAL MEDICINE

## 2021-10-26 PROCEDURE — 82248 BILIRUBIN DIRECT: CPT | Performed by: INTERNAL MEDICINE

## 2021-10-26 PROCEDURE — 80053 COMPREHEN METABOLIC PANEL: CPT | Performed by: INTERNAL MEDICINE

## 2021-10-26 PROCEDURE — 86140 C-REACTIVE PROTEIN: CPT | Performed by: INTERNAL MEDICINE

## 2021-10-26 PROCEDURE — 84145 PROCALCITONIN (PCT): CPT | Performed by: INTERNAL MEDICINE

## 2021-10-26 PROCEDURE — 85379 FIBRIN DEGRADATION QUANT: CPT | Performed by: INTERNAL MEDICINE

## 2021-10-26 RX ORDER — MORPHINE SULFATE 2 MG/ML
2 INJECTION, SOLUTION INTRAMUSCULAR; INTRAVENOUS EVERY 4 HOURS PRN
Status: ACTIVE | OUTPATIENT
Start: 2021-10-26 | End: 2021-11-02

## 2021-10-26 RX ORDER — GABAPENTIN 100 MG/1
100 CAPSULE ORAL NIGHTLY
Status: DISCONTINUED | OUTPATIENT
Start: 2021-10-26 | End: 2021-10-28

## 2021-10-26 RX ORDER — CYCLOBENZAPRINE HCL 10 MG
10 TABLET ORAL 3 TIMES DAILY PRN
Status: DISCONTINUED | OUTPATIENT
Start: 2021-10-26 | End: 2021-11-08 | Stop reason: HOSPADM

## 2021-10-26 RX ORDER — DEXAMETHASONE SODIUM PHOSPHATE 10 MG/ML
6 INJECTION INTRAMUSCULAR; INTRAVENOUS 2 TIMES DAILY
Status: DISCONTINUED | OUTPATIENT
Start: 2021-10-26 | End: 2021-10-28

## 2021-10-26 RX ORDER — HYDROCODONE BITARTRATE AND ACETAMINOPHEN 5; 325 MG/1; MG/1
1 TABLET ORAL EVERY 4 HOURS PRN
Status: DISCONTINUED | OUTPATIENT
Start: 2021-10-26 | End: 2021-11-08 | Stop reason: HOSPADM

## 2021-10-26 RX ADMIN — CYCLOBENZAPRINE 10 MG: 10 TABLET, FILM COATED ORAL at 16:51

## 2021-10-26 RX ADMIN — DEXAMETHASONE SODIUM PHOSPHATE 6 MG: 10 INJECTION INTRAMUSCULAR; INTRAVENOUS at 05:42

## 2021-10-26 RX ADMIN — SODIUM CHLORIDE, PRESERVATIVE FREE 10 ML: 5 INJECTION INTRAVENOUS at 20:18

## 2021-10-26 RX ADMIN — ENOXAPARIN SODIUM 40 MG: 40 INJECTION SUBCUTANEOUS at 20:18

## 2021-10-26 RX ADMIN — HYDROCODONE BITARTRATE AND ACETAMINOPHEN 1 TABLET: 5; 325 TABLET ORAL at 05:42

## 2021-10-26 RX ADMIN — BENZONATATE 200 MG: 100 CAPSULE ORAL at 09:43

## 2021-10-26 RX ADMIN — PANTOPRAZOLE SODIUM 40 MG: 40 TABLET, DELAYED RELEASE ORAL at 05:42

## 2021-10-26 RX ADMIN — BENZONATATE 200 MG: 100 CAPSULE ORAL at 16:51

## 2021-10-26 RX ADMIN — HYDROCODONE BITARTRATE AND ACETAMINOPHEN 1 TABLET: 5; 325 TABLET ORAL at 12:26

## 2021-10-26 RX ADMIN — HYDROCODONE BITARTRATE AND ACETAMINOPHEN 1 TABLET: 5; 325 TABLET ORAL at 18:14

## 2021-10-26 RX ADMIN — REMDESIVIR 100 MG: 100 INJECTION, POWDER, LYOPHILIZED, FOR SOLUTION INTRAVENOUS at 20:18

## 2021-10-26 RX ADMIN — FLUTICASONE PROPIONATE 1 SPRAY: 50 SPRAY, METERED NASAL at 08:31

## 2021-10-26 RX ADMIN — HYDROCODONE BITARTRATE AND ACETAMINOPHEN 1 TABLET: 5; 325 TABLET ORAL at 22:19

## 2021-10-26 RX ADMIN — CYCLOBENZAPRINE 10 MG: 10 TABLET, FILM COATED ORAL at 08:31

## 2021-10-26 RX ADMIN — DEXAMETHASONE SODIUM PHOSPHATE 6 MG: 10 INJECTION INTRAMUSCULAR; INTRAVENOUS at 20:18

## 2021-10-26 RX ADMIN — GABAPENTIN 100 MG: 100 CAPSULE ORAL at 20:18

## 2021-10-27 LAB
ALBUMIN SERPL-MCNC: 3.5 G/DL (ref 3.5–5.2)
ALBUMIN/GLOB SERPL: 1.5 G/DL
ALP SERPL-CCNC: 48 U/L (ref 39–117)
ALT SERPL W P-5'-P-CCNC: 30 U/L (ref 1–33)
ANION GAP SERPL CALCULATED.3IONS-SCNC: 10.2 MMOL/L (ref 5–15)
AST SERPL-CCNC: 37 U/L (ref 1–32)
BASOPHILS # BLD AUTO: 0 10*3/MM3 (ref 0–0.2)
BASOPHILS NFR BLD AUTO: 0 % (ref 0–1.5)
BILIRUB CONJ SERPL-MCNC: <0.2 MG/DL (ref 0–0.3)
BILIRUB SERPL-MCNC: <0.2 MG/DL (ref 0–1.2)
BUN SERPL-MCNC: 18 MG/DL (ref 6–20)
BUN/CREAT SERPL: 32.7 (ref 7–25)
CALCIUM SPEC-SCNC: 8.4 MG/DL (ref 8.6–10.5)
CHLORIDE SERPL-SCNC: 108 MMOL/L (ref 98–107)
CO2 SERPL-SCNC: 23.8 MMOL/L (ref 22–29)
CREAT SERPL-MCNC: 0.55 MG/DL (ref 0.57–1)
CRP SERPL-MCNC: 1.05 MG/DL (ref 0–0.5)
DEPRECATED RDW RBC AUTO: 38.4 FL (ref 37–54)
EOSINOPHIL # BLD AUTO: 0 10*3/MM3 (ref 0–0.4)
EOSINOPHIL NFR BLD AUTO: 0 % (ref 0.3–6.2)
ERYTHROCYTE [DISTWIDTH] IN BLOOD BY AUTOMATED COUNT: 12.8 % (ref 12.3–15.4)
FERRITIN SERPL-MCNC: 667 NG/ML (ref 13–150)
GFR SERPL CREATININE-BSD FRML MDRD: 118 ML/MIN/1.73
GLOBULIN UR ELPH-MCNC: 2.4 GM/DL
GLUCOSE SERPL-MCNC: 135 MG/DL (ref 65–99)
HCT VFR BLD AUTO: 34.3 % (ref 34–46.6)
HGB BLD-MCNC: 11.7 G/DL (ref 12–15.9)
IMM GRANULOCYTES # BLD AUTO: 0.04 10*3/MM3 (ref 0–0.05)
IMM GRANULOCYTES NFR BLD AUTO: 0.5 % (ref 0–0.5)
LYMPHOCYTES # BLD AUTO: 0.46 10*3/MM3 (ref 0.7–3.1)
LYMPHOCYTES NFR BLD AUTO: 5.4 % (ref 19.6–45.3)
MCH RBC QN AUTO: 28.1 PG (ref 26.6–33)
MCHC RBC AUTO-ENTMCNC: 34.1 G/DL (ref 31.5–35.7)
MCV RBC AUTO: 82.3 FL (ref 79–97)
MONOCYTES # BLD AUTO: 0.55 10*3/MM3 (ref 0.1–0.9)
MONOCYTES NFR BLD AUTO: 6.4 % (ref 5–12)
NEUTROPHILS NFR BLD AUTO: 7.5 10*3/MM3 (ref 1.7–7)
NEUTROPHILS NFR BLD AUTO: 87.7 % (ref 42.7–76)
NRBC BLD AUTO-RTO: 0 /100 WBC (ref 0–0.2)
PLATELET # BLD AUTO: 195 10*3/MM3 (ref 140–450)
PMV BLD AUTO: 10.9 FL (ref 6–12)
POTASSIUM SERPL-SCNC: 3.7 MMOL/L (ref 3.5–5.2)
PROT SERPL-MCNC: 5.9 G/DL (ref 6–8.5)
RBC # BLD AUTO: 4.17 10*6/MM3 (ref 3.77–5.28)
SODIUM SERPL-SCNC: 142 MMOL/L (ref 136–145)
WBC # BLD AUTO: 8.55 10*3/MM3 (ref 3.4–10.8)

## 2021-10-27 PROCEDURE — 25010000002 DEXAMETHASONE PER 1 MG: Performed by: INTERNAL MEDICINE

## 2021-10-27 PROCEDURE — 82248 BILIRUBIN DIRECT: CPT | Performed by: INTERNAL MEDICINE

## 2021-10-27 PROCEDURE — 80053 COMPREHEN METABOLIC PANEL: CPT | Performed by: INTERNAL MEDICINE

## 2021-10-27 PROCEDURE — 36415 COLL VENOUS BLD VENIPUNCTURE: CPT | Performed by: INTERNAL MEDICINE

## 2021-10-27 PROCEDURE — 85025 COMPLETE CBC W/AUTO DIFF WBC: CPT | Performed by: INTERNAL MEDICINE

## 2021-10-27 PROCEDURE — 82728 ASSAY OF FERRITIN: CPT | Performed by: INTERNAL MEDICINE

## 2021-10-27 PROCEDURE — 86140 C-REACTIVE PROTEIN: CPT | Performed by: INTERNAL MEDICINE

## 2021-10-27 PROCEDURE — 25010000002 ENOXAPARIN PER 10 MG: Performed by: INTERNAL MEDICINE

## 2021-10-27 RX ADMIN — HYDROCODONE BITARTRATE AND ACETAMINOPHEN 1 TABLET: 5; 325 TABLET ORAL at 06:41

## 2021-10-27 RX ADMIN — HYDROCODONE BITARTRATE AND ACETAMINOPHEN 1 TABLET: 5; 325 TABLET ORAL at 21:08

## 2021-10-27 RX ADMIN — CYCLOBENZAPRINE 10 MG: 10 TABLET, FILM COATED ORAL at 03:10

## 2021-10-27 RX ADMIN — DEXAMETHASONE SODIUM PHOSPHATE 6 MG: 10 INJECTION INTRAMUSCULAR; INTRAVENOUS at 08:59

## 2021-10-27 RX ADMIN — BENZONATATE 200 MG: 100 CAPSULE ORAL at 03:10

## 2021-10-27 RX ADMIN — HYDROCODONE BITARTRATE AND ACETAMINOPHEN 1 TABLET: 5; 325 TABLET ORAL at 16:27

## 2021-10-27 RX ADMIN — ENOXAPARIN SODIUM 40 MG: 40 INJECTION SUBCUTANEOUS at 21:08

## 2021-10-27 RX ADMIN — PANTOPRAZOLE SODIUM 40 MG: 40 TABLET, DELAYED RELEASE ORAL at 06:41

## 2021-10-27 RX ADMIN — FLUTICASONE PROPIONATE 1 SPRAY: 50 SPRAY, METERED NASAL at 08:59

## 2021-10-27 RX ADMIN — CYCLOBENZAPRINE 10 MG: 10 TABLET, FILM COATED ORAL at 11:03

## 2021-10-27 RX ADMIN — DEXAMETHASONE SODIUM PHOSPHATE 6 MG: 10 INJECTION INTRAMUSCULAR; INTRAVENOUS at 21:08

## 2021-10-27 RX ADMIN — GABAPENTIN 100 MG: 100 CAPSULE ORAL at 21:08

## 2021-10-27 RX ADMIN — CETIRIZINE HYDROCHLORIDE 10 MG: 10 TABLET ORAL at 08:59

## 2021-10-27 RX ADMIN — CYCLOBENZAPRINE 10 MG: 10 TABLET, FILM COATED ORAL at 21:08

## 2021-10-27 RX ADMIN — REMDESIVIR 100 MG: 100 INJECTION, POWDER, LYOPHILIZED, FOR SOLUTION INTRAVENOUS at 21:09

## 2021-10-28 ENCOUNTER — APPOINTMENT (OUTPATIENT)
Dept: CT IMAGING | Facility: HOSPITAL | Age: 46
End: 2021-10-28

## 2021-10-28 LAB
ALBUMIN SERPL-MCNC: 3.4 G/DL (ref 3.5–5.2)
ALBUMIN/GLOB SERPL: 1.3 G/DL
ALP SERPL-CCNC: 55 U/L (ref 39–117)
ALT SERPL W P-5'-P-CCNC: 30 U/L (ref 1–33)
ANION GAP SERPL CALCULATED.3IONS-SCNC: 12 MMOL/L (ref 5–15)
AST SERPL-CCNC: 31 U/L (ref 1–32)
BASOPHILS # BLD AUTO: 0.01 10*3/MM3 (ref 0–0.2)
BASOPHILS NFR BLD AUTO: 0.1 % (ref 0–1.5)
BILIRUB CONJ SERPL-MCNC: <0.2 MG/DL (ref 0–0.3)
BILIRUB SERPL-MCNC: <0.2 MG/DL (ref 0–1.2)
BUN SERPL-MCNC: 14 MG/DL (ref 6–20)
BUN/CREAT SERPL: 29.8 (ref 7–25)
CALCIUM SPEC-SCNC: 8.6 MG/DL (ref 8.6–10.5)
CHLORIDE SERPL-SCNC: 106 MMOL/L (ref 98–107)
CO2 SERPL-SCNC: 24 MMOL/L (ref 22–29)
CREAT SERPL-MCNC: 0.47 MG/DL (ref 0.57–1)
CRP SERPL-MCNC: 0.51 MG/DL (ref 0–0.5)
D DIMER PPP FEU-MCNC: <0.27 MCGFEU/ML (ref 0–0.49)
DEPRECATED RDW RBC AUTO: 39.3 FL (ref 37–54)
EOSINOPHIL # BLD AUTO: 0 10*3/MM3 (ref 0–0.4)
EOSINOPHIL NFR BLD AUTO: 0 % (ref 0.3–6.2)
ERYTHROCYTE [DISTWIDTH] IN BLOOD BY AUTOMATED COUNT: 13 % (ref 12.3–15.4)
FERRITIN SERPL-MCNC: 592 NG/ML (ref 13–150)
GFR SERPL CREATININE-BSD FRML MDRD: 142 ML/MIN/1.73
GLOBULIN UR ELPH-MCNC: 2.7 GM/DL
GLUCOSE SERPL-MCNC: 116 MG/DL (ref 65–99)
HCT VFR BLD AUTO: 35.8 % (ref 34–46.6)
HGB BLD-MCNC: 11.9 G/DL (ref 12–15.9)
IMM GRANULOCYTES # BLD AUTO: 0.07 10*3/MM3 (ref 0–0.05)
IMM GRANULOCYTES NFR BLD AUTO: 0.5 % (ref 0–0.5)
LYMPHOCYTES # BLD AUTO: 0.61 10*3/MM3 (ref 0.7–3.1)
LYMPHOCYTES NFR BLD AUTO: 4.8 % (ref 19.6–45.3)
MCH RBC QN AUTO: 27.9 PG (ref 26.6–33)
MCHC RBC AUTO-ENTMCNC: 33.2 G/DL (ref 31.5–35.7)
MCV RBC AUTO: 83.8 FL (ref 79–97)
MONOCYTES # BLD AUTO: 0.69 10*3/MM3 (ref 0.1–0.9)
MONOCYTES NFR BLD AUTO: 5.4 % (ref 5–12)
NEUTROPHILS NFR BLD AUTO: 11.37 10*3/MM3 (ref 1.7–7)
NEUTROPHILS NFR BLD AUTO: 89.2 % (ref 42.7–76)
NRBC BLD AUTO-RTO: 0 /100 WBC (ref 0–0.2)
PLATELET # BLD AUTO: 263 10*3/MM3 (ref 140–450)
PMV BLD AUTO: 11.1 FL (ref 6–12)
POTASSIUM SERPL-SCNC: 4.2 MMOL/L (ref 3.5–5.2)
PROCALCITONIN SERPL-MCNC: 0.04 NG/ML (ref 0–0.25)
PROT SERPL-MCNC: 6.1 G/DL (ref 6–8.5)
QT INTERVAL: 442 MS
RBC # BLD AUTO: 4.27 10*6/MM3 (ref 3.77–5.28)
SODIUM SERPL-SCNC: 142 MMOL/L (ref 136–145)
WBC # BLD AUTO: 12.75 10*3/MM3 (ref 3.4–10.8)

## 2021-10-28 PROCEDURE — 0 IOPAMIDOL PER 1 ML: Performed by: INTERNAL MEDICINE

## 2021-10-28 PROCEDURE — 25010000002 ENOXAPARIN PER 10 MG: Performed by: INTERNAL MEDICINE

## 2021-10-28 PROCEDURE — 93010 ELECTROCARDIOGRAM REPORT: CPT | Performed by: INTERNAL MEDICINE

## 2021-10-28 PROCEDURE — 85379 FIBRIN DEGRADATION QUANT: CPT | Performed by: INTERNAL MEDICINE

## 2021-10-28 PROCEDURE — 25010000002 DEXAMETHASONE PER 1 MG: Performed by: INTERNAL MEDICINE

## 2021-10-28 PROCEDURE — 85025 COMPLETE CBC W/AUTO DIFF WBC: CPT | Performed by: INTERNAL MEDICINE

## 2021-10-28 PROCEDURE — 36415 COLL VENOUS BLD VENIPUNCTURE: CPT | Performed by: INTERNAL MEDICINE

## 2021-10-28 PROCEDURE — 84145 PROCALCITONIN (PCT): CPT | Performed by: INTERNAL MEDICINE

## 2021-10-28 PROCEDURE — 80053 COMPREHEN METABOLIC PANEL: CPT | Performed by: INTERNAL MEDICINE

## 2021-10-28 PROCEDURE — 82728 ASSAY OF FERRITIN: CPT | Performed by: INTERNAL MEDICINE

## 2021-10-28 PROCEDURE — 86140 C-REACTIVE PROTEIN: CPT | Performed by: INTERNAL MEDICINE

## 2021-10-28 PROCEDURE — 94640 AIRWAY INHALATION TREATMENT: CPT

## 2021-10-28 PROCEDURE — 94799 UNLISTED PULMONARY SVC/PX: CPT

## 2021-10-28 PROCEDURE — 63710000001 DEXAMETHASONE PER 0.25 MG: Performed by: INTERNAL MEDICINE

## 2021-10-28 PROCEDURE — 71275 CT ANGIOGRAPHY CHEST: CPT

## 2021-10-28 PROCEDURE — 82248 BILIRUBIN DIRECT: CPT | Performed by: INTERNAL MEDICINE

## 2021-10-28 PROCEDURE — 93005 ELECTROCARDIOGRAM TRACING: CPT | Performed by: INTERNAL MEDICINE

## 2021-10-28 RX ORDER — BISACODYL 10 MG
10 SUPPOSITORY, RECTAL RECTAL DAILY PRN
Status: DISCONTINUED | OUTPATIENT
Start: 2021-10-28 | End: 2021-11-08 | Stop reason: HOSPADM

## 2021-10-28 RX ORDER — AMOXICILLIN 250 MG
2 CAPSULE ORAL 2 TIMES DAILY PRN
Status: DISCONTINUED | OUTPATIENT
Start: 2021-10-28 | End: 2021-10-29

## 2021-10-28 RX ORDER — GABAPENTIN 100 MG/1
200 CAPSULE ORAL NIGHTLY
Status: DISCONTINUED | OUTPATIENT
Start: 2021-10-28 | End: 2021-11-02

## 2021-10-28 RX ORDER — DEXAMETHASONE SODIUM PHOSPHATE 10 MG/ML
6 INJECTION INTRAMUSCULAR; INTRAVENOUS 2 TIMES DAILY
Status: DISCONTINUED | OUTPATIENT
Start: 2021-10-28 | End: 2021-11-01

## 2021-10-28 RX ORDER — IPRATROPIUM BROMIDE AND ALBUTEROL SULFATE 2.5; .5 MG/3ML; MG/3ML
3 SOLUTION RESPIRATORY (INHALATION)
Status: DISCONTINUED | OUTPATIENT
Start: 2021-10-28 | End: 2021-10-29

## 2021-10-28 RX ADMIN — CYCLOBENZAPRINE 10 MG: 10 TABLET, FILM COATED ORAL at 08:19

## 2021-10-28 RX ADMIN — IOPAMIDOL 95 ML: 755 INJECTION, SOLUTION INTRAVENOUS at 22:48

## 2021-10-28 RX ADMIN — REMDESIVIR 100 MG: 100 INJECTION, POWDER, LYOPHILIZED, FOR SOLUTION INTRAVENOUS at 21:56

## 2021-10-28 RX ADMIN — FLUTICASONE PROPIONATE 1 SPRAY: 50 SPRAY, METERED NASAL at 08:19

## 2021-10-28 RX ADMIN — HYDROCODONE BITARTRATE AND ACETAMINOPHEN 1 TABLET: 5; 325 TABLET ORAL at 21:52

## 2021-10-28 RX ADMIN — DOCUSATE SODIUM 50MG AND SENNOSIDES 8.6MG 2 TABLET: 8.6; 5 TABLET, FILM COATED ORAL at 09:31

## 2021-10-28 RX ADMIN — GABAPENTIN 200 MG: 100 CAPSULE ORAL at 21:52

## 2021-10-28 RX ADMIN — CETIRIZINE HYDROCHLORIDE 10 MG: 10 TABLET ORAL at 08:19

## 2021-10-28 RX ADMIN — HYDROCODONE BITARTRATE AND ACETAMINOPHEN 1 TABLET: 5; 325 TABLET ORAL at 12:41

## 2021-10-28 RX ADMIN — CYCLOBENZAPRINE 10 MG: 10 TABLET, FILM COATED ORAL at 23:51

## 2021-10-28 RX ADMIN — HYDROCODONE BITARTRATE AND ACETAMINOPHEN 1 TABLET: 5; 325 TABLET ORAL at 06:23

## 2021-10-28 RX ADMIN — HYDROCODONE BITARTRATE AND ACETAMINOPHEN 1 TABLET: 5; 325 TABLET ORAL at 00:59

## 2021-10-28 RX ADMIN — DEXAMETHASONE 6 MG: 4 TABLET ORAL at 21:52

## 2021-10-28 RX ADMIN — PANTOPRAZOLE SODIUM 40 MG: 40 TABLET, DELAYED RELEASE ORAL at 06:23

## 2021-10-28 RX ADMIN — CYCLOBENZAPRINE 10 MG: 10 TABLET, FILM COATED ORAL at 15:57

## 2021-10-28 RX ADMIN — DEXAMETHASONE SODIUM PHOSPHATE 6 MG: 10 INJECTION INTRAMUSCULAR; INTRAVENOUS at 08:19

## 2021-10-28 RX ADMIN — IPRATROPIUM BROMIDE AND ALBUTEROL SULFATE 3 ML: 2.5; .5 SOLUTION RESPIRATORY (INHALATION) at 19:23

## 2021-10-28 RX ADMIN — ENOXAPARIN SODIUM 40 MG: 40 INJECTION SUBCUTANEOUS at 21:51

## 2021-10-29 LAB
ALBUMIN SERPL-MCNC: 3.7 G/DL (ref 3.5–5.2)
ALBUMIN/GLOB SERPL: 1.2 G/DL
ALP SERPL-CCNC: 75 U/L (ref 39–117)
ALT SERPL W P-5'-P-CCNC: 57 U/L (ref 1–33)
ANION GAP SERPL CALCULATED.3IONS-SCNC: 11.2 MMOL/L (ref 5–15)
AST SERPL-CCNC: 49 U/L (ref 1–32)
BASOPHILS # BLD AUTO: 0.02 10*3/MM3 (ref 0–0.2)
BASOPHILS NFR BLD AUTO: 0.1 % (ref 0–1.5)
BILIRUB CONJ SERPL-MCNC: <0.2 MG/DL (ref 0–0.3)
BILIRUB SERPL-MCNC: 0.2 MG/DL (ref 0–1.2)
BUN SERPL-MCNC: 13 MG/DL (ref 6–20)
BUN/CREAT SERPL: 24.5 (ref 7–25)
CALCIUM SPEC-SCNC: 9.1 MG/DL (ref 8.6–10.5)
CHLORIDE SERPL-SCNC: 104 MMOL/L (ref 98–107)
CO2 SERPL-SCNC: 24.8 MMOL/L (ref 22–29)
CREAT SERPL-MCNC: 0.53 MG/DL (ref 0.57–1)
CRP SERPL-MCNC: 0.4 MG/DL (ref 0–0.5)
DEPRECATED RDW RBC AUTO: 39.7 FL (ref 37–54)
EOSINOPHIL # BLD AUTO: 0 10*3/MM3 (ref 0–0.4)
EOSINOPHIL NFR BLD AUTO: 0 % (ref 0.3–6.2)
ERYTHROCYTE [DISTWIDTH] IN BLOOD BY AUTOMATED COUNT: 12.9 % (ref 12.3–15.4)
FERRITIN SERPL-MCNC: 782 NG/ML (ref 13–150)
GFR SERPL CREATININE-BSD FRML MDRD: 124 ML/MIN/1.73
GLOBULIN UR ELPH-MCNC: 3 GM/DL
GLUCOSE SERPL-MCNC: 128 MG/DL (ref 65–99)
HCT VFR BLD AUTO: 40.8 % (ref 34–46.6)
HGB BLD-MCNC: 13.1 G/DL (ref 12–15.9)
IMM GRANULOCYTES # BLD AUTO: 0.28 10*3/MM3 (ref 0–0.05)
IMM GRANULOCYTES NFR BLD AUTO: 2.1 % (ref 0–0.5)
LYMPHOCYTES # BLD AUTO: 0.72 10*3/MM3 (ref 0.7–3.1)
LYMPHOCYTES NFR BLD AUTO: 5.4 % (ref 19.6–45.3)
MCH RBC QN AUTO: 27.1 PG (ref 26.6–33)
MCHC RBC AUTO-ENTMCNC: 32.1 G/DL (ref 31.5–35.7)
MCV RBC AUTO: 84.3 FL (ref 79–97)
MONOCYTES # BLD AUTO: 0.51 10*3/MM3 (ref 0.1–0.9)
MONOCYTES NFR BLD AUTO: 3.8 % (ref 5–12)
NEUTROPHILS NFR BLD AUTO: 11.81 10*3/MM3 (ref 1.7–7)
NEUTROPHILS NFR BLD AUTO: 88.6 % (ref 42.7–76)
NRBC BLD AUTO-RTO: 0 /100 WBC (ref 0–0.2)
NT-PROBNP SERPL-MCNC: 576.6 PG/ML (ref 0–450)
PLATELET # BLD AUTO: 322 10*3/MM3 (ref 140–450)
PMV BLD AUTO: 10.6 FL (ref 6–12)
POTASSIUM SERPL-SCNC: 4.5 MMOL/L (ref 3.5–5.2)
PROT SERPL-MCNC: 6.7 G/DL (ref 6–8.5)
RBC # BLD AUTO: 4.84 10*6/MM3 (ref 3.77–5.28)
SODIUM SERPL-SCNC: 140 MMOL/L (ref 136–145)
WBC # BLD AUTO: 13.34 10*3/MM3 (ref 3.4–10.8)

## 2021-10-29 PROCEDURE — 85025 COMPLETE CBC W/AUTO DIFF WBC: CPT | Performed by: INTERNAL MEDICINE

## 2021-10-29 PROCEDURE — 36415 COLL VENOUS BLD VENIPUNCTURE: CPT | Performed by: INTERNAL MEDICINE

## 2021-10-29 PROCEDURE — 80053 COMPREHEN METABOLIC PANEL: CPT | Performed by: INTERNAL MEDICINE

## 2021-10-29 PROCEDURE — 94799 UNLISTED PULMONARY SVC/PX: CPT

## 2021-10-29 PROCEDURE — 83880 ASSAY OF NATRIURETIC PEPTIDE: CPT | Performed by: INTERNAL MEDICINE

## 2021-10-29 PROCEDURE — 82248 BILIRUBIN DIRECT: CPT | Performed by: INTERNAL MEDICINE

## 2021-10-29 PROCEDURE — 86140 C-REACTIVE PROTEIN: CPT | Performed by: INTERNAL MEDICINE

## 2021-10-29 PROCEDURE — 63710000001 DEXAMETHASONE PER 0.25 MG: Performed by: INTERNAL MEDICINE

## 2021-10-29 PROCEDURE — 82728 ASSAY OF FERRITIN: CPT | Performed by: INTERNAL MEDICINE

## 2021-10-29 PROCEDURE — 25010000002 ENOXAPARIN PER 10 MG: Performed by: INTERNAL MEDICINE

## 2021-10-29 RX ORDER — ARFORMOTEROL TARTRATE 15 UG/2ML
15 SOLUTION RESPIRATORY (INHALATION)
Status: DISCONTINUED | OUTPATIENT
Start: 2021-10-29 | End: 2021-11-06

## 2021-10-29 RX ORDER — POLYETHYLENE GLYCOL 3350 17 G/17G
17 POWDER, FOR SOLUTION ORAL DAILY
Status: DISCONTINUED | OUTPATIENT
Start: 2021-10-29 | End: 2021-11-08 | Stop reason: HOSPADM

## 2021-10-29 RX ORDER — AMOXICILLIN 250 MG
2 CAPSULE ORAL 2 TIMES DAILY
Status: DISCONTINUED | OUTPATIENT
Start: 2021-10-29 | End: 2021-11-08 | Stop reason: HOSPADM

## 2021-10-29 RX ADMIN — DOCUSATE SODIUM 50MG AND SENNOSIDES 8.6MG 2 TABLET: 8.6; 5 TABLET, FILM COATED ORAL at 20:18

## 2021-10-29 RX ADMIN — IPRATROPIUM BROMIDE AND ALBUTEROL SULFATE 3 ML: 2.5; .5 SOLUTION RESPIRATORY (INHALATION) at 12:22

## 2021-10-29 RX ADMIN — DOCUSATE SODIUM 50MG AND SENNOSIDES 8.6MG 2 TABLET: 8.6; 5 TABLET, FILM COATED ORAL at 08:29

## 2021-10-29 RX ADMIN — HYDROCODONE BITARTRATE AND ACETAMINOPHEN 1 TABLET: 5; 325 TABLET ORAL at 21:53

## 2021-10-29 RX ADMIN — CYCLOBENZAPRINE 10 MG: 10 TABLET, FILM COATED ORAL at 08:29

## 2021-10-29 RX ADMIN — GABAPENTIN 200 MG: 100 CAPSULE ORAL at 20:18

## 2021-10-29 RX ADMIN — CYCLOBENZAPRINE 10 MG: 10 TABLET, FILM COATED ORAL at 20:18

## 2021-10-29 RX ADMIN — POLYETHYLENE GLYCOL 3350 17 G: 17 POWDER, FOR SOLUTION ORAL at 16:17

## 2021-10-29 RX ADMIN — PANTOPRAZOLE SODIUM 40 MG: 40 TABLET, DELAYED RELEASE ORAL at 06:07

## 2021-10-29 RX ADMIN — REMDESIVIR 100 MG: 100 INJECTION, POWDER, LYOPHILIZED, FOR SOLUTION INTRAVENOUS at 20:18

## 2021-10-29 RX ADMIN — DEXAMETHASONE 6 MG: 4 TABLET ORAL at 08:29

## 2021-10-29 RX ADMIN — BISACODYL 10 MG: 10 SUPPOSITORY RECTAL at 06:07

## 2021-10-29 RX ADMIN — CETIRIZINE HYDROCHLORIDE 10 MG: 10 TABLET ORAL at 08:29

## 2021-10-29 RX ADMIN — HYDROCODONE BITARTRATE AND ACETAMINOPHEN 1 TABLET: 5; 325 TABLET ORAL at 16:21

## 2021-10-29 RX ADMIN — ARFORMOTEROL TARTRATE 15 MCG: 15 SOLUTION RESPIRATORY (INHALATION) at 20:39

## 2021-10-29 RX ADMIN — FLUTICASONE PROPIONATE 1 SPRAY: 50 SPRAY, METERED NASAL at 08:33

## 2021-10-29 RX ADMIN — HYDROCODONE BITARTRATE AND ACETAMINOPHEN 1 TABLET: 5; 325 TABLET ORAL at 02:49

## 2021-10-29 RX ADMIN — ENOXAPARIN SODIUM 40 MG: 40 INJECTION SUBCUTANEOUS at 20:18

## 2021-10-29 RX ADMIN — IPRATROPIUM BROMIDE AND ALBUTEROL SULFATE 3 ML: 2.5; .5 SOLUTION RESPIRATORY (INHALATION) at 08:34

## 2021-10-29 RX ADMIN — IPRATROPIUM BROMIDE AND ALBUTEROL SULFATE 3 ML: 2.5; .5 SOLUTION RESPIRATORY (INHALATION) at 16:08

## 2021-10-29 RX ADMIN — SODIUM CHLORIDE, PRESERVATIVE FREE 10 ML: 5 INJECTION INTRAVENOUS at 08:29

## 2021-10-29 RX ADMIN — DEXAMETHASONE 6 MG: 4 TABLET ORAL at 20:18

## 2021-10-30 PROBLEM — J96.01 ACUTE RESPIRATORY FAILURE WITH HYPOXIA (HCC): Status: ACTIVE | Noted: 2021-10-30

## 2021-10-30 PROBLEM — R73.9 STEROID-INDUCED HYPERGLYCEMIA: Status: ACTIVE | Noted: 2021-10-30

## 2021-10-30 PROBLEM — T38.0X5A STEROID-INDUCED HYPERGLYCEMIA: Status: ACTIVE | Noted: 2021-10-30

## 2021-10-30 LAB
ALBUMIN SERPL-MCNC: 3.4 G/DL (ref 3.5–5.2)
ALBUMIN/GLOB SERPL: 1.3 G/DL
ALP SERPL-CCNC: 65 U/L (ref 39–117)
ALT SERPL W P-5'-P-CCNC: 59 U/L (ref 1–33)
ANION GAP SERPL CALCULATED.3IONS-SCNC: 12.9 MMOL/L (ref 5–15)
AST SERPL-CCNC: 52 U/L (ref 1–32)
BASOPHILS # BLD AUTO: 0.04 10*3/MM3 (ref 0–0.2)
BASOPHILS NFR BLD AUTO: 0.4 % (ref 0–1.5)
BILIRUB CONJ SERPL-MCNC: <0.2 MG/DL (ref 0–0.3)
BILIRUB SERPL-MCNC: 0.2 MG/DL (ref 0–1.2)
BUN SERPL-MCNC: 11 MG/DL (ref 6–20)
BUN/CREAT SERPL: 22.9 (ref 7–25)
CALCIUM SPEC-SCNC: 8.8 MG/DL (ref 8.6–10.5)
CHLORIDE SERPL-SCNC: 103 MMOL/L (ref 98–107)
CO2 SERPL-SCNC: 24.1 MMOL/L (ref 22–29)
CREAT SERPL-MCNC: 0.48 MG/DL (ref 0.57–1)
CRP SERPL-MCNC: <0.3 MG/DL (ref 0–0.5)
D DIMER PPP FEU-MCNC: 0.38 MCGFEU/ML (ref 0–0.49)
DEPRECATED RDW RBC AUTO: 38.5 FL (ref 37–54)
EOSINOPHIL # BLD AUTO: 0 10*3/MM3 (ref 0–0.4)
EOSINOPHIL NFR BLD AUTO: 0 % (ref 0.3–6.2)
ERYTHROCYTE [DISTWIDTH] IN BLOOD BY AUTOMATED COUNT: 12.6 % (ref 12.3–15.4)
FERRITIN SERPL-MCNC: 639 NG/ML (ref 13–150)
GFR SERPL CREATININE-BSD FRML MDRD: 139 ML/MIN/1.73
GLOBULIN UR ELPH-MCNC: 2.6 GM/DL
GLUCOSE SERPL-MCNC: 107 MG/DL (ref 65–99)
HCT VFR BLD AUTO: 35.9 % (ref 34–46.6)
HGB BLD-MCNC: 11.9 G/DL (ref 12–15.9)
IMM GRANULOCYTES # BLD AUTO: 0.36 10*3/MM3 (ref 0–0.05)
IMM GRANULOCYTES NFR BLD AUTO: 3.5 % (ref 0–0.5)
LYMPHOCYTES # BLD AUTO: 0.6 10*3/MM3 (ref 0.7–3.1)
LYMPHOCYTES NFR BLD AUTO: 5.9 % (ref 19.6–45.3)
MCH RBC QN AUTO: 27.5 PG (ref 26.6–33)
MCHC RBC AUTO-ENTMCNC: 33.1 G/DL (ref 31.5–35.7)
MCV RBC AUTO: 82.9 FL (ref 79–97)
MONOCYTES # BLD AUTO: 0.69 10*3/MM3 (ref 0.1–0.9)
MONOCYTES NFR BLD AUTO: 6.7 % (ref 5–12)
NEUTROPHILS NFR BLD AUTO: 8.55 10*3/MM3 (ref 1.7–7)
NEUTROPHILS NFR BLD AUTO: 83.5 % (ref 42.7–76)
NRBC BLD AUTO-RTO: 0 /100 WBC (ref 0–0.2)
PLATELET # BLD AUTO: 320 10*3/MM3 (ref 140–450)
PMV BLD AUTO: 10.5 FL (ref 6–12)
POTASSIUM SERPL-SCNC: 4.5 MMOL/L (ref 3.5–5.2)
PROCALCITONIN SERPL-MCNC: 0.05 NG/ML (ref 0–0.25)
PROT SERPL-MCNC: 6 G/DL (ref 6–8.5)
QT INTERVAL: 456 MS
RBC # BLD AUTO: 4.33 10*6/MM3 (ref 3.77–5.28)
SODIUM SERPL-SCNC: 140 MMOL/L (ref 136–145)
WBC # BLD AUTO: 10.24 10*3/MM3 (ref 3.4–10.8)

## 2021-10-30 PROCEDURE — 94799 UNLISTED PULMONARY SVC/PX: CPT

## 2021-10-30 PROCEDURE — 25010000002 ENOXAPARIN PER 10 MG: Performed by: INTERNAL MEDICINE

## 2021-10-30 PROCEDURE — 93005 ELECTROCARDIOGRAM TRACING: CPT | Performed by: STUDENT IN AN ORGANIZED HEALTH CARE EDUCATION/TRAINING PROGRAM

## 2021-10-30 PROCEDURE — 63710000001 DEXAMETHASONE PER 0.25 MG: Performed by: INTERNAL MEDICINE

## 2021-10-30 PROCEDURE — 25010000002 FUROSEMIDE PER 20 MG: Performed by: INTERNAL MEDICINE

## 2021-10-30 PROCEDURE — 85379 FIBRIN DEGRADATION QUANT: CPT | Performed by: INTERNAL MEDICINE

## 2021-10-30 PROCEDURE — 36415 COLL VENOUS BLD VENIPUNCTURE: CPT | Performed by: INTERNAL MEDICINE

## 2021-10-30 PROCEDURE — 80053 COMPREHEN METABOLIC PANEL: CPT | Performed by: INTERNAL MEDICINE

## 2021-10-30 PROCEDURE — 84145 PROCALCITONIN (PCT): CPT | Performed by: INTERNAL MEDICINE

## 2021-10-30 PROCEDURE — 86140 C-REACTIVE PROTEIN: CPT | Performed by: INTERNAL MEDICINE

## 2021-10-30 PROCEDURE — 85025 COMPLETE CBC W/AUTO DIFF WBC: CPT | Performed by: INTERNAL MEDICINE

## 2021-10-30 PROCEDURE — 82248 BILIRUBIN DIRECT: CPT | Performed by: INTERNAL MEDICINE

## 2021-10-30 PROCEDURE — 93010 ELECTROCARDIOGRAM REPORT: CPT | Performed by: INTERNAL MEDICINE

## 2021-10-30 PROCEDURE — 82728 ASSAY OF FERRITIN: CPT | Performed by: INTERNAL MEDICINE

## 2021-10-30 RX ORDER — LIDOCAINE 50 MG/G
1 PATCH TOPICAL
Status: DISCONTINUED | OUTPATIENT
Start: 2021-10-30 | End: 2021-11-08 | Stop reason: HOSPADM

## 2021-10-30 RX ORDER — ECHINACEA PURPUREA EXTRACT 125 MG
2 TABLET ORAL 2 TIMES DAILY
Status: DISCONTINUED | OUTPATIENT
Start: 2021-10-30 | End: 2021-11-08 | Stop reason: HOSPADM

## 2021-10-30 RX ORDER — FUROSEMIDE 10 MG/ML
20 INJECTION INTRAMUSCULAR; INTRAVENOUS ONCE
Status: COMPLETED | OUTPATIENT
Start: 2021-10-30 | End: 2021-10-30

## 2021-10-30 RX ORDER — CHOLECALCIFEROL (VITAMIN D3) 125 MCG
5 CAPSULE ORAL NIGHTLY PRN
Status: DISCONTINUED | OUTPATIENT
Start: 2021-10-30 | End: 2021-11-08 | Stop reason: HOSPADM

## 2021-10-30 RX ORDER — SIMETHICONE 80 MG
80 TABLET,CHEWABLE ORAL 4 TIMES DAILY PRN
Status: DISCONTINUED | OUTPATIENT
Start: 2021-10-30 | End: 2021-11-08 | Stop reason: HOSPADM

## 2021-10-30 RX ADMIN — PANTOPRAZOLE SODIUM 40 MG: 40 TABLET, DELAYED RELEASE ORAL at 06:28

## 2021-10-30 RX ADMIN — CYCLOBENZAPRINE 10 MG: 10 TABLET, FILM COATED ORAL at 08:46

## 2021-10-30 RX ADMIN — FUROSEMIDE 20 MG: 20 INJECTION, SOLUTION INTRAMUSCULAR; INTRAVENOUS at 17:02

## 2021-10-30 RX ADMIN — HYDROCODONE BITARTRATE AND ACETAMINOPHEN 1 TABLET: 5; 325 TABLET ORAL at 03:27

## 2021-10-30 RX ADMIN — CETIRIZINE HYDROCHLORIDE 10 MG: 10 TABLET ORAL at 08:46

## 2021-10-30 RX ADMIN — CYCLOBENZAPRINE 10 MG: 10 TABLET, FILM COATED ORAL at 17:02

## 2021-10-30 RX ADMIN — ENOXAPARIN SODIUM 40 MG: 40 INJECTION SUBCUTANEOUS at 20:01

## 2021-10-30 RX ADMIN — DOCUSATE SODIUM 50MG AND SENNOSIDES 8.6MG 2 TABLET: 8.6; 5 TABLET, FILM COATED ORAL at 08:46

## 2021-10-30 RX ADMIN — ARFORMOTEROL TARTRATE 15 MCG: 15 SOLUTION RESPIRATORY (INHALATION) at 19:31

## 2021-10-30 RX ADMIN — DEXAMETHASONE 6 MG: 4 TABLET ORAL at 20:02

## 2021-10-30 RX ADMIN — SODIUM CHLORIDE, PRESERVATIVE FREE 10 ML: 5 INJECTION INTRAVENOUS at 08:46

## 2021-10-30 RX ADMIN — DEXAMETHASONE 6 MG: 4 TABLET ORAL at 08:46

## 2021-10-30 RX ADMIN — HYDROCODONE BITARTRATE AND ACETAMINOPHEN 1 TABLET: 5; 325 TABLET ORAL at 10:42

## 2021-10-30 RX ADMIN — SIMETHICONE 80 MG: 80 TABLET, CHEWABLE ORAL at 21:33

## 2021-10-30 RX ADMIN — FLUTICASONE PROPIONATE 1 SPRAY: 50 SPRAY, METERED NASAL at 08:50

## 2021-10-30 RX ADMIN — SALINE NASAL SPRAY 2 SPRAY: 1.5 SOLUTION NASAL at 20:04

## 2021-10-30 RX ADMIN — SALINE NASAL SPRAY 2 SPRAY: 1.5 SOLUTION NASAL at 14:25

## 2021-10-30 RX ADMIN — POLYETHYLENE GLYCOL 3350 17 G: 17 POWDER, FOR SOLUTION ORAL at 08:46

## 2021-10-30 RX ADMIN — Medication 5 MG: at 20:01

## 2021-10-30 RX ADMIN — ARFORMOTEROL TARTRATE 15 MCG: 15 SOLUTION RESPIRATORY (INHALATION) at 07:33

## 2021-10-30 RX ADMIN — HYDROCODONE BITARTRATE AND ACETAMINOPHEN 1 TABLET: 5; 325 TABLET ORAL at 14:26

## 2021-10-30 RX ADMIN — HYDROCODONE BITARTRATE AND ACETAMINOPHEN 1 TABLET: 5; 325 TABLET ORAL at 20:02

## 2021-10-30 RX ADMIN — LIDOCAINE 1 PATCH: 50 PATCH CUTANEOUS at 14:26

## 2021-10-30 RX ADMIN — GABAPENTIN 200 MG: 100 CAPSULE ORAL at 20:01

## 2021-10-31 ENCOUNTER — APPOINTMENT (OUTPATIENT)
Dept: GENERAL RADIOLOGY | Facility: HOSPITAL | Age: 46
End: 2021-10-31

## 2021-10-31 LAB
ALBUMIN SERPL-MCNC: 3.6 G/DL (ref 3.5–5.2)
ALP SERPL-CCNC: 68 U/L (ref 39–117)
ALT SERPL W P-5'-P-CCNC: 82 U/L (ref 1–33)
AST SERPL-CCNC: 62 U/L (ref 1–32)
BACTERIA SPEC AEROBE CULT: NORMAL
BILIRUB CONJ SERPL-MCNC: <0.2 MG/DL (ref 0–0.3)
BILIRUB INDIRECT SERPL-MCNC: ABNORMAL MG/DL
BILIRUB SERPL-MCNC: 0.3 MG/DL (ref 0–1.2)
CREAT SERPL-MCNC: 0.6 MG/DL (ref 0.57–1)
FERRITIN SERPL-MCNC: 611 NG/ML (ref 13–150)
GFR SERPL CREATININE-BSD FRML MDRD: 107 ML/MIN/1.73
PROT SERPL-MCNC: 6.4 G/DL (ref 6–8.5)

## 2021-10-31 PROCEDURE — 80076 HEPATIC FUNCTION PANEL: CPT | Performed by: INTERNAL MEDICINE

## 2021-10-31 PROCEDURE — 94799 UNLISTED PULMONARY SVC/PX: CPT

## 2021-10-31 PROCEDURE — 63710000001 DEXAMETHASONE PER 0.25 MG: Performed by: INTERNAL MEDICINE

## 2021-10-31 PROCEDURE — 82565 ASSAY OF CREATININE: CPT | Performed by: INTERNAL MEDICINE

## 2021-10-31 PROCEDURE — 25010000002 ENOXAPARIN PER 10 MG: Performed by: INTERNAL MEDICINE

## 2021-10-31 PROCEDURE — 82728 ASSAY OF FERRITIN: CPT | Performed by: INTERNAL MEDICINE

## 2021-10-31 PROCEDURE — 71045 X-RAY EXAM CHEST 1 VIEW: CPT

## 2021-10-31 RX ADMIN — ENOXAPARIN SODIUM 40 MG: 40 INJECTION SUBCUTANEOUS at 20:43

## 2021-10-31 RX ADMIN — DOCUSATE SODIUM 50MG AND SENNOSIDES 8.6MG 2 TABLET: 8.6; 5 TABLET, FILM COATED ORAL at 20:43

## 2021-10-31 RX ADMIN — DOCUSATE SODIUM 50MG AND SENNOSIDES 8.6MG 2 TABLET: 8.6; 5 TABLET, FILM COATED ORAL at 08:25

## 2021-10-31 RX ADMIN — ARFORMOTEROL TARTRATE 15 MCG: 15 SOLUTION RESPIRATORY (INHALATION) at 22:07

## 2021-10-31 RX ADMIN — HYDROCODONE BITARTRATE AND ACETAMINOPHEN 1 TABLET: 5; 325 TABLET ORAL at 18:33

## 2021-10-31 RX ADMIN — CYCLOBENZAPRINE 10 MG: 10 TABLET, FILM COATED ORAL at 02:31

## 2021-10-31 RX ADMIN — GABAPENTIN 200 MG: 100 CAPSULE ORAL at 20:43

## 2021-10-31 RX ADMIN — DEXAMETHASONE 6 MG: 4 TABLET ORAL at 08:25

## 2021-10-31 RX ADMIN — HYDROCODONE BITARTRATE AND ACETAMINOPHEN 1 TABLET: 5; 325 TABLET ORAL at 14:43

## 2021-10-31 RX ADMIN — CYCLOBENZAPRINE 10 MG: 10 TABLET, FILM COATED ORAL at 22:04

## 2021-10-31 RX ADMIN — DEXAMETHASONE 6 MG: 4 TABLET ORAL at 20:43

## 2021-10-31 RX ADMIN — CETIRIZINE HYDROCHLORIDE 10 MG: 10 TABLET ORAL at 08:25

## 2021-10-31 RX ADMIN — SALINE NASAL SPRAY 2 SPRAY: 1.5 SOLUTION NASAL at 08:27

## 2021-10-31 RX ADMIN — SODIUM CHLORIDE, PRESERVATIVE FREE 10 ML: 5 INJECTION INTRAVENOUS at 20:43

## 2021-10-31 RX ADMIN — HYDROCODONE BITARTRATE AND ACETAMINOPHEN 1 TABLET: 5; 325 TABLET ORAL at 10:36

## 2021-10-31 RX ADMIN — LIDOCAINE 1 PATCH: 50 PATCH CUTANEOUS at 08:24

## 2021-10-31 RX ADMIN — SIMETHICONE 80 MG: 80 TABLET, CHEWABLE ORAL at 10:37

## 2021-10-31 RX ADMIN — ARFORMOTEROL TARTRATE 15 MCG: 15 SOLUTION RESPIRATORY (INHALATION) at 08:53

## 2021-10-31 RX ADMIN — SALINE NASAL SPRAY 2 SPRAY: 1.5 SOLUTION NASAL at 20:44

## 2021-10-31 RX ADMIN — CYCLOBENZAPRINE 10 MG: 10 TABLET, FILM COATED ORAL at 10:28

## 2021-10-31 RX ADMIN — Medication 5 MG: at 22:04

## 2021-10-31 RX ADMIN — FLUTICASONE PROPIONATE 1 SPRAY: 50 SPRAY, METERED NASAL at 08:25

## 2021-10-31 RX ADMIN — HYDROCODONE BITARTRATE AND ACETAMINOPHEN 1 TABLET: 5; 325 TABLET ORAL at 04:05

## 2021-10-31 RX ADMIN — PANTOPRAZOLE SODIUM 40 MG: 40 TABLET, DELAYED RELEASE ORAL at 06:28

## 2021-11-01 PROBLEM — R79.89 ELEVATED LFTS: Status: ACTIVE | Noted: 2021-11-01

## 2021-11-01 LAB
ALBUMIN SERPL-MCNC: 3.5 G/DL (ref 3.5–5.2)
ALBUMIN/GLOB SERPL: 1.3 G/DL
ALP SERPL-CCNC: 73 U/L (ref 39–117)
ALT SERPL W P-5'-P-CCNC: 92 U/L (ref 1–33)
ANION GAP SERPL CALCULATED.3IONS-SCNC: 11.6 MMOL/L (ref 5–15)
AST SERPL-CCNC: 58 U/L (ref 1–32)
BACTERIA SPEC AEROBE CULT: NORMAL
BILIRUB SERPL-MCNC: 0.4 MG/DL (ref 0–1.2)
BUN SERPL-MCNC: 16 MG/DL (ref 6–20)
BUN/CREAT SERPL: 24.2 (ref 7–25)
CALCIUM SPEC-SCNC: 8.8 MG/DL (ref 8.6–10.5)
CHLORIDE SERPL-SCNC: 101 MMOL/L (ref 98–107)
CO2 SERPL-SCNC: 24.4 MMOL/L (ref 22–29)
CREAT SERPL-MCNC: 0.66 MG/DL (ref 0.57–1)
D DIMER PPP FEU-MCNC: 0.4 MCGFEU/ML (ref 0–0.49)
FERRITIN SERPL-MCNC: 591 NG/ML (ref 13–150)
GFR SERPL CREATININE-BSD FRML MDRD: 96 ML/MIN/1.73
GLOBULIN UR ELPH-MCNC: 2.8 GM/DL
GLUCOSE SERPL-MCNC: 109 MG/DL (ref 65–99)
POTASSIUM SERPL-SCNC: 4.2 MMOL/L (ref 3.5–5.2)
PROCALCITONIN SERPL-MCNC: 0.04 NG/ML (ref 0–0.25)
PROT SERPL-MCNC: 6.3 G/DL (ref 6–8.5)
SODIUM SERPL-SCNC: 137 MMOL/L (ref 136–145)

## 2021-11-01 PROCEDURE — 94799 UNLISTED PULMONARY SVC/PX: CPT

## 2021-11-01 PROCEDURE — 84145 PROCALCITONIN (PCT): CPT | Performed by: INTERNAL MEDICINE

## 2021-11-01 PROCEDURE — 63710000001 DEXAMETHASONE PER 0.25 MG: Performed by: INTERNAL MEDICINE

## 2021-11-01 PROCEDURE — 82728 ASSAY OF FERRITIN: CPT | Performed by: INTERNAL MEDICINE

## 2021-11-01 PROCEDURE — 36415 COLL VENOUS BLD VENIPUNCTURE: CPT | Performed by: INTERNAL MEDICINE

## 2021-11-01 PROCEDURE — 80053 COMPREHEN METABOLIC PANEL: CPT | Performed by: STUDENT IN AN ORGANIZED HEALTH CARE EDUCATION/TRAINING PROGRAM

## 2021-11-01 PROCEDURE — 85379 FIBRIN DEGRADATION QUANT: CPT | Performed by: INTERNAL MEDICINE

## 2021-11-01 PROCEDURE — 25010000002 ENOXAPARIN PER 10 MG: Performed by: INTERNAL MEDICINE

## 2021-11-01 RX ORDER — DIPHENHYDRAMINE HYDROCHLORIDE AND LIDOCAINE HYDROCHLORIDE AND ALUMINUM HYDROXIDE AND MAGNESIUM HYDRO
5 KIT EVERY 6 HOURS
Status: DISCONTINUED | OUTPATIENT
Start: 2021-11-01 | End: 2021-11-03

## 2021-11-01 RX ADMIN — SALINE NASAL SPRAY 2 SPRAY: 1.5 SOLUTION NASAL at 20:55

## 2021-11-01 RX ADMIN — CYCLOBENZAPRINE 10 MG: 10 TABLET, FILM COATED ORAL at 23:07

## 2021-11-01 RX ADMIN — FLUTICASONE PROPIONATE 1 SPRAY: 50 SPRAY, METERED NASAL at 08:42

## 2021-11-01 RX ADMIN — GABAPENTIN 200 MG: 100 CAPSULE ORAL at 20:50

## 2021-11-01 RX ADMIN — CYCLOBENZAPRINE 10 MG: 10 TABLET, FILM COATED ORAL at 14:54

## 2021-11-01 RX ADMIN — DIPHENHYDRAMINE HYDROCHLORIDE AND LIDOCAINE HYDROCHLORIDE AND ALUMINUM HYDROXIDE AND MAGNESIUM HYDRO 5 ML: KIT at 21:00

## 2021-11-01 RX ADMIN — DEXAMETHASONE 6 MG: 4 TABLET ORAL at 20:51

## 2021-11-01 RX ADMIN — CETIRIZINE HYDROCHLORIDE 10 MG: 10 TABLET ORAL at 08:40

## 2021-11-01 RX ADMIN — DOCUSATE SODIUM 50MG AND SENNOSIDES 8.6MG 2 TABLET: 8.6; 5 TABLET, FILM COATED ORAL at 08:40

## 2021-11-01 RX ADMIN — ARFORMOTEROL TARTRATE 15 MCG: 15 SOLUTION RESPIRATORY (INHALATION) at 06:56

## 2021-11-01 RX ADMIN — LIDOCAINE 1 PATCH: 50 PATCH CUTANEOUS at 08:40

## 2021-11-01 RX ADMIN — DIPHENHYDRAMINE HYDROCHLORIDE AND LIDOCAINE HYDROCHLORIDE AND ALUMINUM HYDROXIDE AND MAGNESIUM HYDRO 5 ML: KIT at 17:26

## 2021-11-01 RX ADMIN — PANTOPRAZOLE SODIUM 40 MG: 40 TABLET, DELAYED RELEASE ORAL at 07:06

## 2021-11-01 RX ADMIN — ENOXAPARIN SODIUM 40 MG: 40 INJECTION SUBCUTANEOUS at 20:54

## 2021-11-01 RX ADMIN — CYCLOBENZAPRINE 10 MG: 10 TABLET, FILM COATED ORAL at 07:06

## 2021-11-01 RX ADMIN — ARFORMOTEROL TARTRATE 15 MCG: 15 SOLUTION RESPIRATORY (INHALATION) at 22:56

## 2021-11-01 RX ADMIN — DEXAMETHASONE 6 MG: 4 TABLET ORAL at 08:40

## 2021-11-01 RX ADMIN — SALINE NASAL SPRAY 2 SPRAY: 1.5 SOLUTION NASAL at 08:42

## 2021-11-01 RX ADMIN — Medication 5 MG: at 23:07

## 2021-11-01 NOTE — PLAN OF CARE
Goal Outcome Evaluation:  Plan of Care Reviewed With: patient  Progress: no change  Outcome Summary: All needs met. Isolation maintained. Up ad eve. Regular vegetarian vegan diet. VSS. A&Ox4. Weaned to 5L NC. NSR/sinus miriam on the monitor. Flexeril and Melatonin PRN. Will CTM.

## 2021-11-01 NOTE — PLAN OF CARE
Goal Outcome Evaluation:              Outcome Summary: O2 at 4LNC; feeling better today; still desats with minimal exertion; up ad eve; lies prone in bed when not in the chair; Flexeril prn for back pain.

## 2021-11-01 NOTE — PROGRESS NOTES
Name: Claudia Price ADMIT: 10/25/2021   : 1975  PCP: Malika Magana CRNP    MRN: [id number] LOS: 7 days   AGE/SEX: 46 y.o. female  ROOM: [facility]     Subjective   Subjective   Looking much brighter this morning. Says she had a good nights sleep, rested on her stomach to allow her back to decompress. Down to 4L. Eating well.     Review of Systems  Denies chest pain, fevers, abdominal pain, diarrhea     Objective   Objective   Vital Signs  Temp:  [97.8 °F (36.6 °C)-98.7 °F (37.1 °C)] 98.2 °F (36.8 °C)  Heart Rate:  [] 103  Resp:  [20] 20  BP: (106-142)/(75-96) 106/75  SpO2:  [90 %-97 %] 90 %  on  Flow (L/min):  [5-6] 5;   Device (Oxygen Therapy): nasal cannula; humidified  Body mass index is 24.13 kg/m².  Physical Exam  Constitutional:       General: She is not in acute distress.     Appearance: She is not diaphoretic.   Cardiovascular:      Rate and Rhythm: Normal rate and regular rhythm.      Pulses: Normal pulses.      Heart sounds: No murmur heard.  No gallop.    Pulmonary:      Effort: Pulmonary effort is normal. No respiratory distress.      Breath sounds: No wheezing or rhonchi.   Abdominal:      Palpations: Abdomen is soft.      Tenderness: There is no abdominal tenderness. There is no guarding.   Skin:     General: Skin is warm and dry.   Neurological:      Mental Status: She is alert.         Results Review     I reviewed the patient's new clinical results.  Results from last 7 days   Lab Units 10/30/21  0718 10/29/21  0622 10/28/21  0611 10/27/21  0428   WBC 10*3/mm3 10.24 13.34* 12.75* 8.55   HEMOGLOBIN g/dL 11.9* 13.1 11.9* 11.7*   PLATELETS 10*3/mm3 320 322 263 195     Results from last 7 days   Lab Units 21  0459 10/31/21  0502 10/30/21  0718 10/29/21  0622 10/28/21  0611 10/28/21  0611   SODIUM mmol/L 137  --  140 140  --  142   POTASSIUM mmol/L 4.2  --  4.5 4.5  --  4.2   CHLORIDE mmol/L 101  --  103 104  --  106   CO2 mmol/L 24.4  --  24.1 24.8  --  24.0   BUN mg/dL 16   --  11 13  --  14   CREATININE mg/dL 0.66 0.60 0.48* 0.53*   < > 0.47*   GLUCOSE mg/dL 109*  --  107* 128*  --  116*    < > = values in this interval not displayed.   Estimated Creatinine Clearance: 109.5 mL/min (by C-G formula based on SCr of 0.66 mg/dL).  Results from last 7 days   Lab Units 11/01/21  0459 10/31/21  0502 10/30/21  0718 10/29/21  0622   ALBUMIN g/dL 3.50 3.60 3.40* 3.70   BILIRUBIN mg/dL 0.4 0.3 0.2 0.2   ALK PHOS U/L 73 68 65 75   AST (SGOT) U/L 58* 62* 52* 49*   ALT (SGPT) U/L 92* 82* 59* 57*     Results from last 7 days   Lab Units 11/01/21  0459 10/31/21  0502 10/30/21  0718 10/29/21  0622 10/28/21  0611 10/28/21  0611   CALCIUM mg/dL 8.8  --  8.8 9.1  --  8.6   ALBUMIN g/dL 3.50 3.60 3.40* 3.70   < > 3.40*    < > = values in this interval not displayed.     Results from last 7 days   Lab Units 11/01/21  0459 10/30/21  0718 10/28/21  0611 10/26/21  0606   PROCALCITONIN ng/mL 0.04 0.05 0.04 0.05     SARS-CoV-2, CHEMA   Date Value Ref Range Status   10/19/2021 Detected (A) Not Detected Final     Comment:     Patients who have a positive COVID-19 test result may now have  treatment options. Treatment options are available for patients  with mild to moderate symptoms and for hospitalized patients.  Visit our website at [email or web address] for  resources and information.  This nucleic acid amplification test was developed and its performance  characteristics determined by [employer]. Nucleic acid  amplification tests include RT-PCR and TMA. This test has not been  FDA cleared or approved. This test has been authorized by FDA under  an Emergency Use Authorization (EUA). This test is only authorized  for the duration of time the declaration that circumstances exist  justifying the authorization of the emergency use of in vitro  diagnostic tests for detection of SARS-CoV-2 virus and/or diagnosis  of COVID-19 infection under section 564(b)(1) of the Act, 21 U.S.C.  360bbb-3(b) (1),  unless the authorization is terminated or revoked  sooner.  When diagnostic testing is negative, the possibility of a false  negative result should be considered in the context of a patient's  recent exposures and the presence of clinical signs and symptoms  consistent with COVID-19. An individual without symptoms of COVID-19  and who is not shedding SARS-CoV-2 virus would expect to have a  negative (not detected) result in this assay.   12/24/2020 Not Detected Not Detected Final     Comment:     This nucleic acid amplification test was developed and its performance  characteristics determined by [employer]. Nucleic acid  amplification tests include PCR and TMA. This test has not been FDA  cleared or approved. This test has been authorized by FDA under an  Emergency Use Authorization (EUA). This test is only authorized for  the duration of time the declaration that circumstances exist  justifying the authorization of the emergency use of in vitro  diagnostic tests for detection of SARS-CoV-2 virus and/or diagnosis  of COVID-19 infection under section 564(b)(1) of the Act, 21 U.S.C.  360bbb-3(b) (1), unless the authorization is terminated or revoked  sooner.  When diagnostic testing is negative, the possibility of a false  negative result should be considered in the context of a patient's  recent exposures and the presence of clinical signs and symptoms  consistent with COVID-19. An individual without symptoms of COVID-19  and who is not shedding SARS-CoV-2 virus would expect to have a  negative (not detected) result in this assay.     No results found for: HGBA1C, POCGLU    XR Chest 1 View  Narrative: SINGLE VIEW OF THE CHEST     HISTORY: COVID     COMPARISON: 10/25/2021     FINDINGS:  Bilateral infiltrates are unchanged when compared to prior exam. Heart  size is within normal limits. No pneumothorax or pleural effusion is  seen.     Impression: No significant interval change.     This report was  finalized on 10/31/2021 5:48 AM by Dr. Jaida Proctor M.D.       I reviewed the patient's daily medications.  Scheduled Medications  arformoterol, 15 mcg, Nebulization, BID - RT  cetirizine, 10 mg, Oral, Daily  dexamethasone, 6 mg, Oral, BID   Or  dexamethasone, 6 mg, Intravenous, BID  enoxaparin, 40 mg, Subcutaneous, Nightly  fluticasone, 1 spray, Nasal, Daily  gabapentin, 200 mg, Oral, Nightly  lidocaine, 1 patch, Transdermal, Q24H  pantoprazole, 40 mg, Oral, Q AM  polyethylene glycol, 17 g, Oral, Daily  senna-docusate sodium, 2 tablet, Oral, BID  sodium chloride, 2 spray, Each Nare, BID    Infusions   Diet  Diet Regular; Vegetarian; Lacto-Ovo: Allows Dairy Products & Eggs       Assessment/Plan     Active Hospital Problems    Diagnosis  POA   • **Pneumonia due to COVID-19 virus [U07.1, J12.82]  Yes   • Acute respiratory failure with hypoxia ([facility]) [J96.01]  Yes   • Steroid-induced hyperglycemia [R73.9, T38.0X5A]  No   • Factor 5 Leiden mutation, heterozygous ([facility]) [D68.51]  Yes   • Raynaud phenomenon [I73.00]  Yes   • 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       46 y.o. female with history of chronic low back pain who was admitted with acute hypoxic respiratory failure due to COVID-19 pneumonia. She was not vaccinated. She has completed a 5-day course of remdesivir. CTA chest is negative for PE.    Covid 19 PNA with Acute Hypoxic Respiratory Failure:   -She has completed a 5-day course of remdesivir. She is on dexamethasone twice daily. Her inflammatory markers are all downtrending. She has elevated D-dimer but negative CTA chest.  -Negative pro-Guero, no antibiotics for now  -Continue supportive care. Wean oxygen as tolerated. Pulmonology added Brovana    Chronic low back pain:   -Continue neurontin. She has Norco as needed for pain. She is hesitant to ask for this as she  wants to limit her opiate pain medicine.  -Continue lidocaine patch    Elevated LFTs  - mild and likely due to COVID. Recommend rechecking as outpatient    Factor V Leiden: monitor  Asthma: No bronchospasm currently.  Bradycardia: No heart block on ekg. continue to monitor  Constipation: Continue bowel regimen.      · Lovenox 40 mg SC daily for DVT prophylaxis.  · Full code.  · Discussed with patient  · Anticipate discharge home in 1-2 days. Pending improvement in oxygenation.        Vicki Owens DO  [facility]  11/01/21  12:35 EDT    Patient was placed in face mask on first look.  I wore protective equipment throughout this patient encounter including a face mask, gloves and protective eyewear.  Hand hygiene was performed before donning protective equipment and after removal when leaving the room.

## 2021-11-01 NOTE — PAYOR COMM NOTE
"Claudia Jenkins (46 y.o. Female)     PLEASE SEE ATTACHED CLINICAL  REVIEW.     REF#[id number]    PLEASE CALL   OR  [phone]    THANK YOU    TONY CASTELAN LPN CCP            Date of Birth Social Security Number Address Home Phone MRN    1975  [address]  [place] [id number] [phone] [id number]    Scientologist Marital Status             None        Admission Date Admission Type Admitting Provider Attending Provider Department, Room/Bed    10/25/21 Emergency Juvenal Fung MD George, Katherine, DO [address], [address]/1    Discharge Date Discharge Disposition Discharge Destination                         Attending Provider: Vicki Owens DO    Allergies: Pseudoephedrine, Nedocromil    Isolation: Enh Drop/Con   Infection: COVID (confirmed) (10/20/21)   Code Status: CPR   Advance Care Planning Activity    Ht: 165.1 cm (65\")   Wt: 65.8 kg (145 lb)    Admission Cmt: None   Principal Problem: Pneumonia due to COVID-19 virus [U07.1,J12.82]                 Active Insurance as of 10/25/2021     Primary Coverage     Payor Plan Insurance Group Employer/Plan Group    [facility] [employer] [facility] [employer] [facility] PPO [id number]     Payor Plan Address Payor Plan Phone Number Payor Plan Fax Number Effective Dates    [address] [phone]  6/1/2014 - None Entered    [place] [address]       Subscriber Name Subscriber Birth Date Member ID       DONI JENKINS 3/27/1973 [id number]                 Emergency Contacts      (Rel.) Home Phone Work Phone Mobile Phone    Doni Jenkins (Spouse) -- -- [phone]    Joao Aguilar (Brother) -- -- [phone]              Oxygen Therapy (last 3 days)     Date/Time SpO2 Device (Oxygen Therapy) Flow (L/min) Oxygen Concentration (%) ETCO2 (mmHg)    11/01/21 1353 94 humidified; nasal cannula 5 -- --    11/01/21 1230 91 humidified; nasal cannula 5 -- --    11/01/21 0855 -- nasal cannula; humidified 5 -- --    " 11/01/21 0849 90 humidified; nasal cannula 5 -- --    11/01/21 0753 93 humidified; nasal cannula 5 -- --    11/01/21 0703 95 nasal cannula; humidified -- -- --    11/01/21 0656 -- -- 5 -- --    11/01/21 0056 -- humidified; nasal cannula 5 -- --    10/31/21 2243 93 high-flow nasal cannula 5 -- --    10/31/21 2207 94 humidified; high-flow nasal cannula 5 -- --    10/31/21 2041 -- humidified; nasal cannula 5 -- --    10/31/21 1958 97 humidified; high-flow nasal cannula 6 -- --    10/31/21 1400 -- humidified; high-flow nasal cannula 6 -- --    10/31/21 1300 92 humidified; high-flow nasal cannula 6 -- --    10/31/21 0900 -- high-flow nasal cannula; humidified 6 -- --    10/31/21 0853 92 humidified; high-flow nasal cannula 6 -- --    10/31/21 0822 92 -- -- -- --    10/31/21 0800 -- humidified; high-flow nasal cannula 6 -- --    10/31/21 0000 -- humidified; high-flow nasal cannula 6 -- --    10/30/21 2337 96 high-flow nasal cannula; humidified 6 -- --    10/30/21 2010 91 humidified; high-flow nasal cannula 6 -- --    10/30/21 2000 -- humidified; high-flow nasal cannula 6 -- --    10/30/21 1934 -- humidified; high-flow nasal cannula 6 -- --    10/30/21 1931 -- humidified; high-flow nasal cannula 6 -- --    10/30/21 1902 95 humidified; nasal cannula 6 -- --    10/30/21 1705 95 humidified; high-flow nasal cannula 6 -- --    10/30/21 1430 95 humidified; high-flow nasal cannula 6 -- --    10/30/21 1420 97 high-flow nasal cannula; humidified 7 -- --    10/30/21 1407 -- humidified; high-flow nasal cannula 7 -- --    10/30/21 1300 94 high-flow nasal cannula; humidified -- -- --    10/30/21 1259 94 high-flow nasal cannula; humidified 7 -- --    10/30/21 1248 97 humidified; high-flow nasal cannula 8 -- --    10/30/21 1241 96 humidified; high-flow nasal cannula 9 -- --    10/30/21 1235 99 humidified; high-flow nasal cannula 10 -- --    10/30/21 1110 92 humidified; high-flow nasal cannula 10 -- --    10/30/21 1107 91 humidified;  high-flow nasal cannula 8 -- --    10/30/21 1104 88 humidified; high-flow nasal cannula 7 -- --    10/30/21 1055 90 humidified; high-flow nasal cannula 7 -- --    10/30/21 1052 -- humidified; high-flow nasal cannula 7 -- --    10/30/21 1047 88 humidified; nasal cannula 5 -- --    10/30/21 1046 90 nasal cannula; humidified 5 -- --    10/30/21 0740 97 nasal cannula 5 -- --    10/30/21 0734 -- nasal cannula 5 -- --    10/30/21 0700 99 humidified; nasal cannula -- -- --    10/30/21 0000 -- humidified; nasal cannula 5 -- --    10/29/21 2323 96 nasal cannula; humidified 5 -- --    10/29/21 2039 95 nasal cannula; humidified 5 -- --    10/29/21 2000 -- humidified; nasal cannula 5 -- --    10/29/21 1941 96 humidified; nasal cannula 5 -- --    10/29/21 1621 -- nasal cannula; humidified 5 -- --    10/29/21 1608 94 nasal cannula 5 -- --    10/29/21 1422 92 nasal cannula 5 -- --    10/29/21 1222 97 nasal cannula 5 -- --    10/29/21 1205 94 nasal cannula; humidified 5 -- --    10/29/21 0948 96 humidified; nasal cannula 5 -- --    10/29/21 0945 97 nasal cannula; humidified 6 -- --    10/29/21 0847 94 nasal cannula; humidified 6 -- --    10/29/21 0840 92 nasal cannula; humidified 6 -- --    10/29/21 0838 91 nasal cannula 6 -- --    10/29/21 0834 83 nasal cannula 4 -- --    10/29/21 0830 87 nasal cannula; humidified 4 -- --    10/29/21 0829 -- humidified; nasal cannula 4 -- --    10/29/21 0628 -- nasal cannula 4 -- --    10/29/21 0000 -- nasal cannula 3 -- --        Intake & Output (last 3 days)       10/29 0701  10/30 0700 10/30 0701  10/31 0700 10/31 0701  11/01 0700 11/01 0701 11/02 0700    P.O. 360 180 150 400    Total Intake(mL/kg) 360 (5.5) 180 (2.7) 150 (2.3) 400 (6.1)    Urine (mL/kg/hr) 2000 (1.3) 3500 (2.2) 1500 (0.9) 2400 (4.2)    Stool  0  0    Total Output 2000 3500 1500 2400    Net -1640 -3320 -1350 -2000            Urine Unmeasured Occurrence 1 x 1 x 1 x     Stool Unmeasured Occurrence 1 x 1 x  1 x         Lines,  Drains & Airways     Active LDAs     Name Placement date Placement time Site Days    Peripheral IV 10/28/21 1312 Left Antecubital 10/28/21  1312  Antecubital  4                  Medication Administration Report for Claudia Price as of 11/01/21 1543   Legend:    Given Hold Not Given Due Canceled Entry Other Actions    Time Time (Time) Time  Time-Action       Discontinued     Completed     Future     MAR Hold     Linked           Medications 10/30/21 10/31/21 11/01/21    acetaminophen (TYLENOL) tablet 650 mg  Dose: 650 mg  Freq: Every 6 Hours PRN Route: PO  PRN Reason: Mild Pain   Start: 10/25/21 2015   Admin Instructions:   Do not exceed 4 grams of acetaminophen in a 24 hr period. Max dose of 2gm for AST/ALT greater than 120 units/L      If given for pain, use the following pain scale:   Mild Pain = Pain Score of 1-3, CPOT 1-2  Moderate Pain = Pain Score of 4-6, CPOT 3-4  Severe Pain = Pain Score of 7-10, CPOT 5-8          arformoterol (BROVANA) nebulizer solution 15 mcg  Dose: 15 mcg  Freq: 2 Times Daily - RT Route: NEBULIZATION  Start: 10/29/21 2130   Admin Instructions:   Keep refrigerated.       0733-Given     1931-Given            0853-Given     2207-Given            0656-Given     2130            benzonatate (TESSALON) capsule 200 mg  Dose: 200 mg  Freq: 3 Times Daily PRN Route: PO  PRN Reason: Cough  Start: 10/25/21 1649   Admin Instructions:   Swallow whole.  Do not crush, chew, or open capsule.          bisacodyl (DULCOLAX) suppository 10 mg  Dose: 10 mg  Freq: Daily PRN Route: RE  PRN Reason: Constipation  Start: 10/28/21 0814          cetirizine (zyrTEC) tablet 10 mg  Dose: 10 mg  Freq: Daily Route: PO  Start: 10/25/21 2100       0846-Given             0825-Given             0840-Given             cyclobenzaprine (FLEXERIL) tablet 10 mg  Dose: 10 mg  Freq: 3 Times Daily PRN Route: PO  PRN Reason: Muscle Spasms  Start: 10/26/21 0930       0846-Given     1702-Given            0231-Given     1028-Given      2204-Given           0706-Given     1454-Given            dexamethasone (DECADRON) tablet 6 mg  Dose: 6 mg  Freq: 2 Times Daily Route: PO  Start: 10/28/21 1100   Admin Instructions:   Take with food.       0846-Given     2002-Given            0825-Given     2043-Given            0840-Given     2100           Or  dexamethasone (DECADRON) injection 6 mg  Dose: 6 mg  Freq: 2 Times Daily Route: IV  Start: 10/28/21 1100   End: 11/01/21 1530   Admin Instructions:   For IV administration.  May be pushed over a minimum of 1 minute.       0846-Not Given:  See Alt     2002-Not Given:  See Alt            0825-Not Given:  See Alt     2043-Not Given:  See Alt            0840-Not Given:  See Alt             enoxaparin (LOVENOX) syringe 40 mg  Dose: 40 mg  Freq: Nightly Route: SC  Indications of Use: PROPHYLAXIS OF VENOUS THROMBOEMBOLISM  Start: 10/25/21 2100   Admin Instructions:   Give subcutaneous in abdomen only. Do not massage site after injection.       2001-Given 2043-Given 2100             First Mouthwash (Magic Mouthwash) 5 mL  Dose: 5 mL  Freq: Every 6 Hours Route: SWISH & SPIT  Start: 11/01/21 1600   End: 11/04/21 1559   Admin Instructions:   Shake Well Before Each Use. Stable 180 days at room temp after mixing.         1600 2200            fluticasone (FLONASE) 50 MCG/ACT nasal spray 1 spray  Dose: 1 spray  Freq: Daily Route: NA  Start: 10/26/21 0900       0850-Given             0825-Given             0842-Given             gabapentin (NEURONTIN) capsule 200 mg  Dose: 200 mg  Freq: Nightly Route: PO  Start: 10/28/21 2100   Admin Instructions:          2001-Given             2043-Given 2100             HYDROcodone-acetaminophen (NORCO) 5-325 MG per tablet 1 tablet  Dose: 1 tablet  Freq: Every 4 Hours PRN Route: PO  PRN Reason: Moderate Pain   Start: 10/26/21 0945   Admin Instructions:   [DILSHAD]    Do not exceed 4 grams of acetaminophen in a 24 hr period. Max dose of 2gm for AST/ALT  greater than 120 units/L        If given for pain, use the following pain scale:   Mild Pain = Pain Score of 1-3, CPOT 1-2  Moderate Pain = Pain Score of 4-6, CPOT 3-4  Severe Pain = Pain Score of 7-10, CPOT 5-8       0327-Given     1042-Given     1426-Given       2002-Given             0405-Given     1036-Given     1443-Given       1833-Given             0822-Canceled Entry             lidocaine (LIDODERM) 5 % 1 patch  Dose: 1 patch  Freq: Every 24 Hours Scheduled Route: TD  Start: 10/30/21 1245   Admin Instructions:   Apply to skin on low back . Remove patch in 12 hours. Apply patch only once for up to 12 hours in 24 hour period.  If given for pain, use the following pain scale:  Mild Pain = Pain Score of 1-3, CPOT 1-2  Moderate Pain = Pain Score of 4-6, CPOT 3-4  Severe Pain = Pain Score of 7-10, CPOT 5-8       1426-Medication Applied [C]             0309-Medication Removed     0824-Medication Applied     2043-Medication Removed           0840-Medication Applied [C]     2040 (Medication Removed)            melatonin tablet 5 mg  Dose: 5 mg  Freq: Nightly PRN Route: PO  PRN Reason: Sleep  Start: 10/30/21 1603       2001-Given             2204-Given              morphine injection 2 mg  Dose: 2 mg  Freq: Every 4 Hours PRN Route: IV  PRN Reason: Severe Pain   Start: 10/26/21 0940   End: 11/02/21 0939   Admin Instructions:   If given for pain, use the following pain scale:  Mild Pain = Pain Score of 1-3, CPOT 1-2  Moderate Pain = Pain Score of 4-6, CPOT 3-4  Severe Pain = Pain Score of 7-10, CPOT 5-8          nitroglycerin (NITROSTAT) SL tablet 0.4 mg  Dose: 0.4 mg  Freq: Every 5 Minutes PRN Route: SL  PRN Reason: Chest Pain  PRN Comment: Only if SBP Greater Than 100  Start: 10/25/21 1936   Admin Instructions:   If Pain Unrelieved After 3 Doses Notify MD          pantoprazole (PROTONIX) EC tablet 40 mg  Dose: 40 mg  Freq: Every Early Morning Route: PO  Start: 10/26/21 0600   End: 11/07/21 0559   Admin Instructions:    Swallow whole; do not crush, split, or chew.       0628-Given             0628-Given             0706-Given             polyethylene glycol (MIRALAX) packet 17 g  Dose: 17 g  Freq: Daily Route: PO  Start: 10/29/21 1500   Admin Instructions:   Use 4-8 ounces of water, tea, or juice for each 17 gram dose.       0846-Given             (0827)-Not Given             (0842)-Not Given             sennosides-docusate (PERICOLACE) 8.6-50 MG per tablet 2 tablet  Dose: 2 tablet  Freq: 2 Times Daily Route: PO  Start: 10/29/21 2100       0846-Given     (2058)-Not Given            0825-Given     2043-Given            0840-Given     2100            simethicone (MYLICON) chewable tablet 80 mg  Dose: 80 mg  Freq: 4 Times Daily PRN Route: PO  PRN Reason: Flatulence  Start: 10/30/21 2050       2133-Given             1037-Given              sodium chloride 0.9 % flush 10 mL  Dose: 10 mL  Freq: As Needed Route: IV  PRN Reason: Line Care  Start: 10/25/21 1327       0846-Given             2043-Given              sodium chloride nasal spray 2 spray  Dose: 2 spray  Freq: 2 Times Daily Route: EACH NARE  Start: 10/30/21 1300   Admin Instructions:          1425-Given     2004-Given            0827-Given     2044-Given            0842-Given     2100           Completed Medications  Medications 10/30/21 10/31/21 11/01/21       dexamethasone (DECADRON) injection 6 mg  Dose: 6 mg  Freq: Once Route: IV  Start: 10/25/21 1349   End: 10/25/21 1354   Admin Instructions:   For IV administration.  May be pushed over a minimum of 1 minute.          furosemide (LASIX) injection 20 mg  Dose: 20 mg  Freq: Once Route: IV  Start: 10/30/21 1700   End: 10/30/21 1702       1702-Given               iopamidol (ISOVUE-370) 76 % injection 100 mL  Dose: 100 mL  Freq: Once in Imaging Route: IV  Start: 10/28/21 2345   End: 10/28/21 2248          remdesivir 200 mg in sodium chloride 0.9 % 290 mL IVPB (powder vial)  Dose: 200 mg  Freq: Every 24 Hours Route: IV  Start:  10/25/21 2245   End: 10/25/21 2358   Admin Instructions:   Ensure all of the drug is administered. Flush line with 30mL NS after administration.         Followed by  remdesivir 100 mg in sodium chloride 0.9 % 270 mL IVPB (powder vial)  Dose: 100 mg  Freq: Every 24 Hours Route: IV  Start: 10/26/21 2100   End: 10/29/21 2118   Admin Instructions:   Ensure all of the drug is administered. Flush line with 30mL NS after administration.         Discontinued Medications  Medications 10/30/21 10/31/21 11/01/21       cyclobenzaprine (FLEXERIL) tablet 10 mg  Dose: 10 mg  Freq: 3 Times Daily Route: PO  Start: 10/25/21 2245   End: 10/26/21 0930          dexamethasone (DECADRON) injection 6 mg  Dose: 6 mg  Freq: 2 Times Daily Route: IV  Start: 10/26/21 2100   End: 10/28/21 1002   Admin Instructions:   For IV administration.  May be pushed over a minimum of 1 minute.          dexamethasone (DECADRON) injection 6 mg  Dose: 6 mg  Freq: Every 8 Hours Route: IV  Start: 10/25/21 2245   End: 10/26/21 0930   Admin Instructions:   For IV administration.  May be pushed over a minimum of 1 minute.          gabapentin (NEURONTIN) capsule 100 mg  Dose: 100 mg  Freq: Nightly Route: PO  Start: 10/26/21 2100   End: 10/28/21 0955   Admin Instructions:             HYDROcodone-acetaminophen (NORCO) 5-325 MG per tablet 1 tablet  Dose: 1 tablet  Freq: Every 6 Hours PRN Route: PO  PRN Reason: Moderate Pain   Start: 10/25/21 2015   End: 10/26/21 0940   Admin Instructions:   [DILSHAD]    Do not exceed 4 grams of acetaminophen in a 24 hr period. Max dose of 2gm for AST/ALT greater than 120 units/L        If given for pain, use the following pain scale:   Mild Pain = Pain Score of 1-3, CPOT 1-2  Moderate Pain = Pain Score of 4-6, CPOT 3-4  Severe Pain = Pain Score of 7-10, CPOT 5-8          ipratropium-albuterol (DUO-NEB) nebulizer solution 3 mL  Dose: 3 mL  Freq: 4 Times Daily - RT Route: NEBULIZATION  Start: 10/28/21 2030   End: 10/29/21 1714           Pharmacy Consult - Remdesivir  Freq: Continuous PRN Route: XX  PRN Reason: Consult  Start: 10/25/21 2146   End: 10/26/21 0819          sennosides-docusate (PERICOLACE) 8.6-50 MG per tablet 2 tablet  Dose: 2 tablet  Freq: 2 Times Daily PRN Route: PO  PRN Reason: Constipation  Start: 10/28/21 0814   End: 10/29/21 1404                   Vicki Owens, DO at 21 1234              Name: Claudia Price ADMIT: 10/25/2021   : 1975  PCP: Malika Magana CRNP    MRN: [id number] LOS: 7 days   AGE/SEX: 46 y.o. female  ROOM: [facility]     Subjective   Subjective   Looking much brighter this morning. Says she had a good nights sleep, rested on her stomach to allow her back to decompress. Down to 4L. Eating well.     Review of Systems  Denies chest pain, fevers, abdominal pain, diarrhea    Objective   Objective   Vital Signs  Temp:  [97.8 °F (36.6 °C)-98.7 °F (37.1 °C)] 98.2 °F (36.8 °C)  Heart Rate:  [] 103  Resp:  [20] 20  BP: (106-142)/(75-96) 106/75  SpO2:  [90 %-97 %] 90 %  on  Flow (L/min):  [5-6] 5;   Device (Oxygen Therapy): nasal cannula; humidified  Body mass index is 24.13 kg/m².  Physical Exam  Constitutional:       General: She is not in acute distress.     Appearance: She is not diaphoretic.   Cardiovascular:      Rate and Rhythm: Normal rate and regular rhythm.      Pulses: Normal pulses.      Heart sounds: No murmur heard.  No gallop.    Pulmonary:      Effort: Pulmonary effort is normal. No respiratory distress.      Breath sounds: No wheezing or rhonchi.   Abdominal:      Palpations: Abdomen is soft.      Tenderness: There is no abdominal tenderness. There is no guarding.   Skin:     General: Skin is warm and dry.   Neurological:      Mental Status: She is alert.         Results Review     I reviewed the patient's new clinical results.  Results from last 7 days   Lab Units 10/30/21  0718 10/29/21  0622 10/28/21  0611 10/27/21  0428   WBC 10*3/mm3 10.24 13.34* 12.75* 8.55   HEMOGLOBIN  g/dL 11.9* 13.1 11.9* 11.7*   PLATELETS 10*3/mm3 320 322 263 195     Results from last 7 days   Lab Units 11/01/21  0459 10/31/21  0502 10/30/21  0718 10/29/21  0622 10/28/21  0611 10/28/21  0611   SODIUM mmol/L 137  --  140 140  --  142   POTASSIUM mmol/L 4.2  --  4.5 4.5  --  4.2   CHLORIDE mmol/L 101  --  103 104  --  106   CO2 mmol/L 24.4  --  24.1 24.8  --  24.0   BUN mg/dL 16  --  11 13  --  14   CREATININE mg/dL 0.66 0.60 0.48* 0.53*   < > 0.47*   GLUCOSE mg/dL 109*  --  107* 128*  --  116*    < > = values in this interval not displayed.   Estimated Creatinine Clearance: 109.5 mL/min (by C-G formula based on SCr of 0.66 mg/dL).  Results from last 7 days   Lab Units 11/01/21  0459 10/31/21  0502 10/30/21  0718 10/29/21  0622   ALBUMIN g/dL 3.50 3.60 3.40* 3.70   BILIRUBIN mg/dL 0.4 0.3 0.2 0.2   ALK PHOS U/L 73 68 65 75   AST (SGOT) U/L 58* 62* 52* 49*   ALT (SGPT) U/L 92* 82* 59* 57*     Results from last 7 days   Lab Units 11/01/21  0459 10/31/21  0502 10/30/21  0718 10/29/21  0622 10/28/21  0611 10/28/21  0611   CALCIUM mg/dL 8.8  --  8.8 9.1  --  8.6   ALBUMIN g/dL 3.50 3.60 3.40* 3.70   < > 3.40*    < > = values in this interval not displayed.     Results from last 7 days   Lab Units 11/01/21  0459 10/30/21  0718 10/28/21  0611 10/26/21  0606   PROCALCITONIN ng/mL 0.04 0.05 0.04 0.05     SARS-CoV-2, CHEMA   Date Value Ref Range Status   10/19/2021 Detected (A) Not Detected Final     Comment:     Patients who have a positive COVID-19 test result may now have  treatment options. Treatment options are available for patients  with mild to moderate symptoms and for hospitalized patients.  Visit our website at [email or web address] for  resources and information.  This nucleic acid amplification test was developed and its performance  characteristics determined by "[employer]. Nucleic acid  amplification tests include RT-PCR and TMA. This test has not been  FDA cleared or approved. This test  has been authorized by FDA under  an Emergency Use Authorization (EUA). This test is only authorized  for the duration of time the declaration that circumstances exist  justifying the authorization of the emergency use of in vitro  diagnostic tests for detection of SARS-CoV-2 virus and/or diagnosis  of COVID-19 infection under section 564(b)(1) of the Act, 21 U.S.C.  360bbb-3(b) (1), unless the authorization is terminated or revoked  sooner.  When diagnostic testing is negative, the possibility of a false  negative result should be considered in the context of a patient's  recent exposures and the presence of clinical signs and symptoms  consistent with COVID-19. An individual without symptoms of COVID-19  and who is not shedding SARS-CoV-2 virus would expect to have a  negative (not detected) result in this assay.   12/24/2020 Not Detected Not Detected Final     Comment:     This nucleic acid amplification test was developed and its performance  characteristics determined by [employer]. Nucleic acid  amplification tests include PCR and TMA. This test has not been FDA  cleared or approved. This test has been authorized by FDA under an  Emergency Use Authorization (EUA). This test is only authorized for  the duration of time the declaration that circumstances exist  justifying the authorization of the emergency use of in vitro  diagnostic tests for detection of SARS-CoV-2 virus and/or diagnosis  of COVID-19 infection under section 564(b)(1) of the Act, 21 U.S.C.  360bbb-3(b) (1), unless the authorization is terminated or revoked  sooner.  When diagnostic testing is negative, the possibility of a false  negative result should be considered in the context of a patient's  recent exposures and the presence of clinical signs and symptoms  consistent with COVID-19. An individual without symptoms of COVID-19  and who is not shedding SARS-CoV-2 virus would expect to have a  negative (not detected) result in this  assay.     No results found for: HGBA1C, POCGLU    XR Chest 1 View  Narrative: SINGLE VIEW OF THE CHEST     HISTORY: COVID     COMPARISON: 10/25/2021     FINDINGS:  Bilateral infiltrates are unchanged when compared to prior exam. Heart  size is within normal limits. No pneumothorax or pleural effusion is  seen.     Impression: No significant interval change.     This report was finalized on 10/31/2021 5:48 AM by Dr. Jaida Proctor M.D.       I reviewed the patient's daily medications.  Scheduled Medications  arformoterol, 15 mcg, Nebulization, BID - RT  cetirizine, 10 mg, Oral, Daily  dexamethasone, 6 mg, Oral, BID   Or  dexamethasone, 6 mg, Intravenous, BID  enoxaparin, 40 mg, Subcutaneous, Nightly  fluticasone, 1 spray, Nasal, Daily  gabapentin, 200 mg, Oral, Nightly  lidocaine, 1 patch, Transdermal, Q24H  pantoprazole, 40 mg, Oral, Q AM  polyethylene glycol, 17 g, Oral, Daily  senna-docusate sodium, 2 tablet, Oral, BID  sodium chloride, 2 spray, Each Nare, BID    Infusions   Diet  Diet Regular; Vegetarian; Lacto-Ovo: Allows Dairy Products & Eggs      Assessment/Plan     Active Hospital Problems    Diagnosis  POA   • **Pneumonia due to COVID-19 virus [U07.1, J12.82]  Yes   • Acute respiratory failure with hypoxia (HCC) [J96.01]  Yes   • Steroid-induced hyperglycemia [R73.9, T38.0X5A]  No   • Factor 5 Leiden mutation, heterozygous (HCC) [D68.51]  Yes   • Raynaud phenomenon [I73.00]  Yes   • 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       46 y.o. female with history of chronic low back pain who was admitted with acute hypoxic respiratory failure due to COVID-19 pneumonia. She was not vaccinated. She has completed a 5-day course of remdesivir. CTA chest is negative for PE.    Covid 19 PNA with Acute Hypoxic Respiratory Failure:   -She has completed a 5-day course of  remdesivir. She is on dexamethasone twice daily. Her inflammatory markers are all downtrending. She has elevated D-dimer but negative CTA chest.  -Negative pro-Guero, no antibiotics for now  -Continue supportive care. Wean oxygen as tolerated. Pulmonology added Brovana    Chronic low back pain:   -Continue neurontin. She has Norco as needed for pain. She is hesitant to ask for this as she wants to limit her opiate pain medicine.  -Continue lidocaine patch    Elevated LFTs  - mild and likely due to COVID. Recommend rechecking as outpatient    Factor V Leiden: monitor  Asthma: No bronchospasm currently.  Bradycardia: No heart block on ekg. continue to monitor  Constipation: Continue bowel regimen.      · Lovenox 40 mg SC daily for DVT prophylaxis.  · Full code.  · Discussed with patient  · Anticipate discharge home in 1-2 days. Pending improvement in oxygenation.        Vicki Owens DO  [facility]  11/01/21  12:35 EDT    Patient was placed in face mask on first look.  I wore protective equipment throughout this patient encounter including a face mask, gloves and protective eyewear.  Hand hygiene was performed before donning protective equipment and after removal when leaving the room.          Electronically signed by Vicki Owens DO at 11/01/21 1239     Caesar Guillen MD at 10/31/21 7640                                      Caesar Guillen MD                          [phone]      Patient ID:    Name:  Claudia Price    MRN:  [id number]    1975   46 y.o.  female            Patient Care Team:  Malika Magana CRNP as PCP - General (Family Medicine)    CC/ Reason for visit: Acute respiratory failure, acute COVID-19 pneumonia, acute back pain with sciatica    Subjective: Pt seen and examined this AM. Stable to mildly worse supplemental oxygen needs.  Limited mobility due to back pain and anxiety issues as well as panic attack    ROS: Denies any subjective  fevers, syncope or presyncopal events, new neurological deficits, nausea or vomiting currently    Objective     Vital Signs past 24hrs    BP range: BP: (104-124)/(70-85) 104/70  Pulse range: Heart Rate:  [53-89] 70  Resp rate range: Resp:  [16-20] 20  Temp range: Temp (24hrs), Av.9 °F (36.6 °C), Min:96.8 °F (36 °C), Max:98.7 °F (37.1 °C)      Ventilator/Non-Invasive Ventilation Settings (From admission, onward)            None          Device (Oxygen Therapy): humidified; high-flow nasal cannula       65.8 kg (145 lb); Body mass index is 24.13 kg/m².      Intake/Output Summary (Last 24 hours) at 10/31/2021 1700  Last data filed at 10/31/2021 1300  Gross per 24 hour   Intake 180 ml   Output 3900 ml   Net -3720 ml       PHYSICAL EXAM   Constitutional: Middle aged pt in bed, No acute respiratory distress, + accessory muscle use  Head: - NCAT  Eyes: No pallor.  Anicteric sclerae, EOMI.  ENMT:  Mallampati 3, no oral thrush. Moist MM.   NECK: Trachea midline, No thyromegaly, no palpable cervical lymphadenopathy  Heart: RRR, no murmur. No pedal edema   Lungs: SPIKE +, No wheezes.  Occasional crackles heard    Abdomen: Soft. No tenderness, guarding or rigidity. No palpable masses  Extremities: Extremities warm and well perfused. No cyanosis/ clubbing  Neuro: Conscious, answers appropriately, no gross focal neuro deficits  Psych: Mood and affect -anxious    PPE recommended per [facility] infectious disease Isolation protocol for the current clinical scenario(as mentioned below) was followed.     Scheduled meds:  arformoterol, 15 mcg, Nebulization, BID - RT  cetirizine, 10 mg, Oral, Daily  dexamethasone, 6 mg, Oral, BID   Or  dexamethasone, 6 mg, Intravenous, BID  enoxaparin, 40 mg, Subcutaneous, Nightly  fluticasone, 1 spray, Nasal, Daily  gabapentin, 200 mg, Oral, Nightly  lidocaine, 1 patch, Transdermal, Q24H  pantoprazole, 40 mg, Oral, Q AM  polyethylene glycol, 17 g, Oral, Daily  senna-docusate sodium, 2 tablet,  Oral, BID  sodium chloride, 2 spray, Each Nare, BID        IV meds:                           Data Review:      Results from last 7 days   Lab Units 10/31/21  0502 10/30/21  0718 10/29/21  0622 10/28/21  0611 10/28/21  0611 10/27/21  0428 10/26/21  0606   SODIUM mmol/L  --  140 140  --  142   < > 143   POTASSIUM mmol/L  --  4.5 4.5  --  4.2   < > 4.2   CHLORIDE mmol/L  --  103 104  --  106   < > 107   CO2 mmol/L  --  24.1 24.8  --  24.0   < > 19.5*   BUN mg/dL  --  11 13  --  14   < > 12   CREATININE mg/dL 0.60 0.48* 0.53*   < > 0.47*   < > 0.54*   CALCIUM mg/dL  --  8.8 9.1  --  8.6   < > 8.8   BILIRUBIN mg/dL 0.3 0.2 0.2   < > <0.2   < > 0.2   ALK PHOS U/L 68 65 75   < > 55   < > 55   ALT (SGPT) U/L 82* 59* 57*   < > 30   < > 31   AST (SGOT) U/L 62* 52* 49*   < > 31   < > 49*   GLUCOSE mg/dL  --  107* 128*  --  116*   < > 127*   WBC 10*3/mm3  --  10.24 13.34*  --  12.75*   < > 1.99*   HEMOGLOBIN g/dL  --  11.9* 13.1  --  11.9*   < > 12.4   PLATELETS 10*3/mm3  --  320 322  --  263   < > 150   PROBNP pg/mL  --   --  576.6*  --   --   --   --    PROCALCITONIN ng/mL  --  0.05  --   --  0.04  --  0.05    < > = values in this interval not displayed.       Lab Results   Component Value Date    CALCIUM 8.8 10/30/2021       Results from last 7 days   Lab Units 10/26/21  2008   BLOODCX  No growth at 4 days  No growth at 4 days     COVID LABS:  Results From Last 14 Days   Lab Units 10/31/21  0502 10/30/21  0836 10/30/21  0718 10/29/21  0622 10/28/21  0611 10/28/21  0611 10/27/21  0428 10/26/21  0606 10/25/21  1347 10/23/21  1744   PROBNP pg/mL  --   --   --  576.6*  --   --   --   --   --  60.7   CRP mg/dL  --   --  <0.30 0.40  --  0.51*   < > 2.71*  --   --    D DIMER QUANT MCGFEU/mL  --  0.38  --   --   --  <0.27  --  0.30  --  0.38   FERRITIN ng/mL 611.00*  --  639.00* 782.00*   < > 592.00*   < >  --   --   --    PROCALCITONIN ng/mL  --   --  0.05  --   --  0.04  --  0.05   < >  --    TROPONIN T ng/mL  --   --   --   --    --   --   --   --   --  <0.010    < > = values in this interval not displayed.              Results Review:    I have reviewed the relevant laboratory results and independently reviewed the chest imaging from this hospitalization including the available echocardiogram reports personally and summarized it if/ when appropriate below    Assessment    Acute hypoxic respiratory failure; severe  Acute COVID-19 pneumonia- Unvaccinated patient  Elevated inflammatory markers  Mild hepatitis  Sciatica     RECOMMENDATIONS:  Patient still with significant supplemental oxygen. Inflammatory markers are plateauing and so is her clinical status. CT angiogram ruled out pulmonary embolism.  Negative D-dimer noted  Continue with high-dose steroids and remdesivir  Continue with bronchodilators.   We will keep the patient as negative as tolerated with spot diuretics.  Continue trend inflammatory markers  OOB as tolerated     DVT prophylaxis -emotional support provided    Caesar Guillen MD  10/31/2021    Electronically signed by Caesar Guillen MD at 10/31/21 1702     Vicki Owens DO at 10/31/21 1520              Name: Claudia Price ADMIT: 10/25/2021   : 1975  PCP: Malika Magana CRNP    MRN: [id number] LOS: 6 days   AGE/SEX: 46 y.o. female  ROOM: [facility]     Subjective   Subjective   Back pain still present.  Some improvement with lidocaine patch.  Had a better night sleep with the addition of melatonin.  Was down to 5 L nasal cannula yesterday but back up to 7 L this morning.  Still short of breath with minimal exertion    Review of Systems  Denies chest pain, fevers, abdominal pain, diarrhea    Objective   Objective   Vital Signs  Temp:  [96.8 °F (36 °C)-98.7 °F (37.1 °C)] 98.7 °F (37.1 °C)  Heart Rate:  [53-89] 70  Resp:  [16-20] 20  BP: (104-124)/(70-85) 104/70  SpO2:  [91 %-96 %] 92 %  on  Flow (L/min):  [6] 6;   Device (Oxygen Therapy): humidified; high-flow nasal cannula  Body mass index is  24.13 kg/m².  Physical Exam  Constitutional:       General: She is not in acute distress.     Appearance: She is not diaphoretic.   Cardiovascular:      Rate and Rhythm: Normal rate and regular rhythm.      Pulses: Normal pulses.      Heart sounds: No murmur heard.  No gallop.    Pulmonary:      Effort: Pulmonary effort is normal. No respiratory distress.      Breath sounds: No wheezing or rhonchi.   Abdominal:      Palpations: Abdomen is soft.      Tenderness: There is no abdominal tenderness. There is no guarding.   Skin:     General: Skin is warm and dry.   Neurological:      Mental Status: She is alert.         Results Review     I reviewed the patient's new clinical results.  Results from last 7 days   Lab Units 10/30/21  0718 10/29/21  0622 10/28/21  0611 10/27/21  0428   WBC 10*3/mm3 10.24 13.34* 12.75* 8.55   HEMOGLOBIN g/dL 11.9* 13.1 11.9* 11.7*   PLATELETS 10*3/mm3 320 322 263 195     Results from last 7 days   Lab Units 10/31/21  0502 10/30/21  0718 10/29/21  0622 10/28/21  0611 10/27/21  0428 10/27/21  0428   SODIUM mmol/L  --  140 140 142  --  142   POTASSIUM mmol/L  --  4.5 4.5 4.2  --  3.7   CHLORIDE mmol/L  --  103 104 106  --  108*   CO2 mmol/L  --  24.1 24.8 24.0  --  23.8   BUN mg/dL  --  11 13 14  --  18   CREATININE mg/dL 0.60 0.48* 0.53* 0.47*   < > 0.55*   GLUCOSE mg/dL  --  107* 128* 116*  --  135*    < > = values in this interval not displayed.   Estimated Creatinine Clearance: 120.4 mL/min (by C-G formula based on SCr of 0.6 mg/dL).  Results from last 7 days   Lab Units 10/31/21  0502 10/30/21  0718 10/29/21  0622 10/28/21  0611   ALBUMIN g/dL 3.60 3.40* 3.70 3.40*   BILIRUBIN mg/dL 0.3 0.2 0.2 <0.2   ALK PHOS U/L 68 65 75 55   AST (SGOT) U/L 62* 52* 49* 31   ALT (SGPT) U/L 82* 59* 57* 30     Results from last 7 days   Lab Units 10/31/21  0502 10/30/21  0718 10/29/21  0622 10/28/21  0611 10/27/21  0428 10/27/21  0428   CALCIUM mg/dL  --  8.8 9.1 8.6  --  8.4*   ALBUMIN g/dL 3.60 3.40* 3.70  3.40*   < > 3.50    < > = values in this interval not displayed.     Results from last 7 days   Lab Units 10/30/21  0718 10/28/21  0611 10/26/21  0606 10/25/21  1347   PROCALCITONIN ng/mL 0.05 0.04 0.05 0.06     SARS-CoV-2, CHEMA   Date Value Ref Range Status   10/19/2021 Detected (A) Not Detected Final     Comment:     Patients who have a positive COVID-19 test result may now have  treatment options. Treatment options are available for patients  with mild to moderate symptoms and for hospitalized patients.  Visit our website at [email or web address] for  resources and information.  This nucleic acid amplification test was developed and its performance  characteristics determined by [employer]. Nucleic acid  amplification tests include RT-PCR and TMA. This test has not been  FDA cleared or approved. This test has been authorized by FDA under  an Emergency Use Authorization (EUA). This test is only authorized  for the duration of time the declaration that circumstances exist  justifying the authorization of the emergency use of in vitro  diagnostic tests for detection of SARS-CoV-2 virus and/or diagnosis  of COVID-19 infection under section 564(b)(1) of the Act, 21 U.S.C.  360bbb-3(b) (1), unless the authorization is terminated or revoked  sooner.  When diagnostic testing is negative, the possibility of a false  negative result should be considered in the context of a patient's  recent exposures and the presence of clinical signs and symptoms  consistent with COVID-19. An individual without symptoms of COVID-19  and who is not shedding SARS-CoV-2 virus would expect to have a  negative (not detected) result in this assay.   12/24/2020 Not Detected Not Detected Final     Comment:     This nucleic acid amplification test was developed and its performance  characteristics determined by [facility]. Nucleic acid  amplification tests include PCR and TMA. This test has not been FDA  cleared or  approved. This test has been authorized by FDA under an  Emergency Use Authorization (EUA). This test is only authorized for  the duration of time the declaration that circumstances exist  justifying the authorization of the emergency use of in vitro  diagnostic tests for detection of SARS-CoV-2 virus and/or diagnosis  of COVID-19 infection under section 564(b)(1) of the Act, 21 U.S.C.  360bbb-3(b) (1), unless the authorization is terminated or revoked  sooner.  When diagnostic testing is negative, the possibility of a false  negative result should be considered in the context of a patient's  recent exposures and the presence of clinical signs and symptoms  consistent with COVID-19. An individual without symptoms of COVID-19  and who is not shedding SARS-CoV-2 virus would expect to have a  negative (not detected) result in this assay.     No results found for: HGBA1C, POCGLU    XR Chest 1 View  Narrative: SINGLE VIEW OF THE CHEST     HISTORY: COVID     COMPARISON: 10/25/2021     FINDINGS:  Bilateral infiltrates are unchanged when compared to prior exam. Heart  size is within normal limits. No pneumothorax or pleural effusion is  seen.     Impression: No significant interval change.     This report was finalized on 10/31/2021 5:48 AM by Dr. Jaida Proctor M.D.       I reviewed the patient's daily medications.  Scheduled Medications  arformoterol, 15 mcg, Nebulization, BID - RT  cetirizine, 10 mg, Oral, Daily  dexamethasone, 6 mg, Oral, BID   Or  dexamethasone, 6 mg, Intravenous, BID  enoxaparin, 40 mg, Subcutaneous, Nightly  fluticasone, 1 spray, Nasal, Daily  gabapentin, 200 mg, Oral, Nightly  lidocaine, 1 patch, Transdermal, Q24H  pantoprazole, 40 mg, Oral, Q AM  polyethylene glycol, 17 g, Oral, Daily  senna-docusate sodium, 2 tablet, Oral, BID  sodium chloride, 2 spray, Each Nare, BID    Infusions   Diet  Diet Regular; Vegetarian; Lacto-Ovo: Allows Dairy Products & Eggs      Assessment/Plan     Active Hospital  Problems    Diagnosis  POA   • **Pneumonia due to COVID-19 virus [U07.1, J12.82]  Yes   • Acute respiratory failure with hypoxia (HCC) [J96.01]  Yes   • Steroid-induced hyperglycemia [R73.9, T38.0X5A]  No   • Factor 5 Leiden mutation, heterozygous (HCC) [D68.51]  Yes   • Raynaud phenomenon [I73.00]  Yes   • 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       46 y.o. female with history of chronic low back pain who was admitted with acute hypoxic respiratory failure due to COVID-19 pneumonia. She was not vaccinated. She has completed a 5-day course of remdesivir. CTA chest is negative for PE.    Covid 19 PNA with Acute Hypoxic Respiratory Failure:   -She has completed a 5-day course of remdesivir. She is on dexamethasone twice daily. Her inflammatory markers are all downtrending. She has elevated D-dimer but negative CTA chest.  -Negative pro-Guero, no antibiotics for now  -Continue supportive care. Wean oxygen as tolerated. Pulmonology added Brovana    Chronic low back pain:   -Continue neurontin. She has Norco as needed for pain. 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  -Continue lidocaine patch    Factor V Leiden: monitor  Asthma: No bronchospasm currently.  Bradycardia: No heart block on ekg. continue to monitor  Constipation: Continue bowel regimen.      · Lovenox 40 mg SC daily for DVT prophylaxis.  · Full code.  · Discussed with patient and nursing staff.  · Anticipate discharge home timing yet to be determined. Pending improvement in oxygenation.  Possibly 2 to 3 days      Vicki Owens DO  [facility]  10/31/21  15:21 EDT    Patient was placed in face mask on first look.  I wore protective equipment throughout this patient encounter including a face mask, gloves and protective eyewear.  Hand  hygiene was performed before donning protective equipment and after removal when leaving the room.          Electronically signed by Vicki Owens DO at 10/31/21 1523     Caesar Guillen MD at 10/30/21 1608                                      Caesar Guillen MD                          [phone]      Patient ID:    Name:  Claudia Price    MRN:  [id number]    1975   46 y.o.  female            Patient Care Team:  Malika Magana CRNP as PCP - General (Family Medicine)    CC/ Reason for visit: Acute respiratory failure, acute COVID-19 pneumonia, acute back pain with sciatica    Subjective: Pt seen and examined this AM.  Noted some worsening oxygen needs up to 5 L this morning.  Stable to mildly worse supplemental oxygen needs.  Decreased secretion clearance and states that she had a bad night with back pain.    ROS: Denies any subjective fevers, syncope or presyncopal events, new neurological deficits, nausea or vomiting currently    Objective     Vital Signs past 24hrs    BP range: BP: (111-131)/(75-90) 115/77  Pulse range: Heart Rate:  [53-93] 81  Resp rate range: Resp:  [16-20] 18  Temp range: Temp (24hrs), Av °F (36.7 °C), Min:97.4 °F (36.3 °C), Max:98.9 °F (37.2 °C)      Ventilator/Non-Invasive Ventilation Settings (From admission, onward)            None          Device (Oxygen Therapy): humidified; high-flow nasal cannula       65.8 kg (145 lb); Body mass index is 24.13 kg/m².      Intake/Output Summary (Last 24 hours) at 10/30/2021 1608  Last data filed at 10/30/2021 1046  Gross per 24 hour   Intake --   Output 1200 ml   Net -1200 ml       PHYSICAL EXAM   Constitutional: Middle aged pt in bed, No acute respiratory distress, + accessory muscle use  Head: - NCAT  Eyes: No pallor.  Anicteric sclerae, EOMI.  ENMT:  Mallampati 3, no oral thrush. Moist MM.   NECK: Trachea midline, No thyromegaly, no palpable cervical lymphadenopathy  Heart: RRR, no murmur. No pedal edema    Lungs: SPIKE +, No wheezes.  Occasional crackles heard    Abdomen: Soft. No tenderness, guarding or rigidity. No palpable masses  Extremities: Extremities warm and well perfused. No cyanosis/ clubbing  Neuro: Conscious, answers appropriately, no gross focal neuro deficits  Psych: Mood and affect -anxious    PPE recommended per [facility] [place] infectious disease Isolation protocol for the current clinical scenario(as mentioned below) was followed.     Scheduled meds:  arformoterol, 15 mcg, Nebulization, BID - RT  cetirizine, 10 mg, Oral, Daily  dexamethasone, 6 mg, Oral, BID   Or  dexamethasone, 6 mg, Intravenous, BID  enoxaparin, 40 mg, Subcutaneous, Nightly  fluticasone, 1 spray, Nasal, Daily  gabapentin, 200 mg, Oral, Nightly  lidocaine, 1 patch, Transdermal, Q24H  pantoprazole, 40 mg, Oral, Q AM  polyethylene glycol, 17 g, Oral, Daily  senna-docusate sodium, 2 tablet, Oral, BID  sodium chloride, 2 spray, Each Nare, BID        IV meds:                           Data Review:      Results from last 7 days   Lab Units 10/30/21  0718 10/29/21  0622 10/28/21  0611 10/27/21  0428 10/26/21  0606 10/25/21  1347 10/23/21  1744   SODIUM mmol/L 140 140 142   < > 143   < > 135*   POTASSIUM mmol/L 4.5 4.5 4.2   < > 4.2   < > 3.3*   CHLORIDE mmol/L 103 104 106   < > 107   < > 100   CO2 mmol/L 24.1 24.8 24.0   < > 19.5*   < > 22.2   BUN mg/dL 11 13 14   < > 12   < > 9   CREATININE mg/dL 0.48* 0.53* 0.47*   < > 0.54*   < > 0.67   CALCIUM mg/dL 8.8 9.1 8.6   < > 8.8   < > 7.9*   BILIRUBIN mg/dL 0.2 0.2 <0.2   < > 0.2   < > <0.2   ALK PHOS U/L 65 75 55   < > 55   < > 49   ALT (SGPT) U/L 59* 57* 30   < > 31   < > 14   AST (SGOT) U/L 52* 49* 31   < > 49*   < > 30   GLUCOSE mg/dL 107* 128* 116*   < > 127*   < > 125*   WBC 10*3/mm3 10.24 13.34* 12.75*   < > 1.99*   < > 3.83   HEMOGLOBIN g/dL 11.9* 13.1 11.9*   < > 12.4   < > 11.5*   PLATELETS 10*3/mm3 320 322 263   < > 150   < > 122*   PROBNP pg/mL  --  576.6*  --   --   --   --  60.7    PROCALCITONIN ng/mL 0.05  --  0.04  --  0.05   < >  --     < > = values in this interval not displayed.       Lab Results   Component Value Date    CALCIUM 8.8 10/30/2021       Results from last 7 days   Lab Units 10/26/21  2008   BLOODCX  No growth at 3 days  No growth at 3 days     COVID LABS:  Results From Last 14 Days   Lab Units 10/30/21  0836 10/30/21  0718 10/29/21  0622 10/28/21  0611 10/27/21  0428 10/26/21  0606 10/25/21  1347 10/23/21  1744   PROBNP pg/mL  --   --  576.6*  --   --   --   --  60.7   CRP mg/dL  --  <0.30 0.40 0.51*   < > 2.71*  --   --    D DIMER QUANT MCGFEU/mL 0.38  --   --  <0.27  --  0.30  --  0.38   FERRITIN ng/mL  --  639.00* 782.00* 592.00*   < >  --   --   --    PROCALCITONIN ng/mL  --  0.05  --  0.04  --  0.05   < >  --    TROPONIN T ng/mL  --   --   --   --   --   --   --  <0.010    < > = values in this interval not displayed.              Results Review:    I have reviewed the relevant laboratory results and independently reviewed the chest imaging from this hospitalization including the available echocardiogram reports personally and summarized it if/ when appropriate below    Assessment    Acute hypoxic respiratory failure; severe  Acute COVID-19 pneumonia- Unvaccinated patient  Elevated inflammatory markers  Mild hepatitis  Sciatica     RECOMMENDATIONS:  Patient still with significant supplemental oxygen now on low-dose high flow nasal cannula.  Inflammatory markers are moving the right direction other than ferritin which is mildly high.  Atelectasis seems to be playing a significant role with decreased air movement. CT angiogram ruled out pulmonary embolism.  Negative D-dimer noted  Continue with high-dose steroids and remdesivir  Continue with bronchodilators.   We will keep the patient as negative as tolerated with spot diuretics.  Continue trend inflammatory markers  Back pain and sciatica per primary     DVT prophylaxis -emotional support provided    Caesar Deras  MD [facility]  10/30/2021    Electronically signed by Caesar Guillen MD at 10/30/21 4659     Vicki Owens DO at 10/30/21 1496              Name: Claudia Price ADMIT: 10/25/2021   : 1975  PCP: Malika Magana CRNP    MRN: [id number] LOS: 5 days   AGE/SEX: 46 y.o. female  ROOM: [facility]     Subjective   Subjective   Seen sitting up in chair. Complains of low back pain which is worse with sitting up and laying flat. This has been a chronic problem but worsened acutely due to her illness and hospitalization. She is a bit tearful this morning; she is feeling overwhelmed. Offered for her to speak to the , she agrees.    Review of Systems  As above    Objective   Objective   Vital Signs  Temp:  [97.4 °F (36.3 °C)-98.9 °F (37.2 °C)] 98.9 °F (37.2 °C)  Heart Rate:  [53-93] 81  Resp:  [16-20] 18  BP: (111-131)/(75-90) 115/77  SpO2:  [88 %-99 %] 95 %  on  Flow (L/min):  [5-10] 6;   Device (Oxygen Therapy): humidified; high-flow nasal cannula  Body mass index is 24.13 kg/m².  Physical Exam  Constitutional:       General: She is not in acute distress.     Appearance: She is not diaphoretic.   Cardiovascular:      Rate and Rhythm: Normal rate and regular rhythm.      Pulses: Normal pulses.      Heart sounds: No murmur heard.  No gallop.    Pulmonary:      Effort: Pulmonary effort is normal. No respiratory distress.      Breath sounds: No wheezing or rhonchi.   Abdominal:      Palpations: Abdomen is soft.      Tenderness: There is no abdominal tenderness. There is no guarding.   Skin:     General: Skin is warm and dry.   Neurological:      Mental Status: She is alert.   Psychiatric:      Comments: Tearful         Results Review     I reviewed the patient's new clinical results.  Results from last 7 days   Lab Units 10/30/21  0718 10/29/21  0622 10/28/21  0611 10/27/21  0428   WBC 10*3/mm3 10.24 13.34* 12.75* 8.55   HEMOGLOBIN g/dL 11.9* 13.1 11.9* 11.7*   PLATELETS 10*3/mm3 320 322 263 195      Results from last 7 days   Lab Units 10/30/21  0718 10/29/21  0622 10/28/21  0611 10/27/21  0428   SODIUM mmol/L 140 140 142 142   POTASSIUM mmol/L 4.5 4.5 4.2 3.7   CHLORIDE mmol/L 103 104 106 108*   CO2 mmol/L 24.1 24.8 24.0 23.8   BUN mg/dL 11 13 14 18   CREATININE mg/dL 0.48* 0.53* 0.47* 0.55*   GLUCOSE mg/dL 107* 128* 116* 135*   Estimated Creatinine Clearance: 150.5 mL/min (A) (by C-G formula based on SCr of 0.48 mg/dL (L)).  Results from last 7 days   Lab Units 10/30/21  0718 10/29/21  0622 10/28/21  0611 10/27/21  0428   ALBUMIN g/dL 3.40* 3.70 3.40* 3.50   BILIRUBIN mg/dL 0.2 0.2 <0.2 <0.2   ALK PHOS U/L 65 75 55 48   AST (SGOT) U/L 52* 49* 31 37*   ALT (SGPT) U/L 59* 57* 30 30     Results from last 7 days   Lab Units 10/30/21  0718 10/29/21  0622 10/28/21  0611 10/27/21  0428   CALCIUM mg/dL 8.8 9.1 8.6 8.4*   ALBUMIN g/dL 3.40* 3.70 3.40* 3.50     Results from last 7 days   Lab Units 10/30/21  0718 10/28/21  0611 10/26/21  0606 10/25/21  1347   PROCALCITONIN ng/mL 0.05 0.04 0.05 0.06     SARS-CoV-2, CHEMA   Date Value Ref Range Status   10/19/2021 Detected (A) Not Detected Final     Comment:     Patients who have a positive COVID-19 test result may now have  treatment options. Treatment options are available for patients  with mild to moderate symptoms and for hospitalized patients.  Visit our website at [email or web address] for  resources and information.  This nucleic acid amplification test was developed and its performance  characteristics determined by [facility]. Nucleic acid  amplification tests include RT-PCR and TMA. This test has not been  FDA cleared or approved. This test has been authorized by FDA under  an Emergency Use Authorization (EUA). This test is only authorized  for the duration of time the declaration that circumstances exist  justifying the authorization of the emergency use of in vitro  diagnostic tests for detection of SARS-CoV-2 virus and/or diagnosis  of  COVID-19 infection under section 564(b)(1) of the Act, 21 U.S.C.  360bbb-3(b) (1), unless the authorization is terminated or revoked  sooner.  When diagnostic testing is negative, the possibility of a false  negative result should be considered in the context of a patient's  recent exposures and the presence of clinical signs and symptoms  consistent with COVID-19. An individual without symptoms of COVID-19  and who is not shedding SARS-CoV-2 virus would expect to have a  negative (not detected) result in this assay.   12/24/2020 Not Detected Not Detected Final     Comment:     This nucleic acid amplification test was developed and its performance  characteristics determined by [facility]. Nucleic acid  amplification tests include PCR and TMA. This test has not been FDA  cleared or approved. This test has been authorized by FDA under an  Emergency Use Authorization (EUA). This test is only authorized for  the duration of time the declaration that circumstances exist  justifying the authorization of the emergency use of in vitro  diagnostic tests for detection of SARS-CoV-2 virus and/or diagnosis  of COVID-19 infection under section 564(b)(1) of the Act, 21 U.S.C.  360bbb-3(b) (1), unless the authorization is terminated or revoked  sooner.  When diagnostic testing is negative, the possibility of a false  negative result should be considered in the context of a patient's  recent exposures and the presence of clinical signs and symptoms  consistent with COVID-19. An individual without symptoms of COVID-19  and who is not shedding SARS-CoV-2 virus would expect to have a  negative (not detected) result in this assay.     No results found for: HGBA1C, POCGLU    CT Angiogram Chest With & Without Contrast  Narrative: Patient: MARRY JENKINS  Time Out: 23:26  Exam(s): CTA CHEST W WO Contrast     EXAM:    CT Angiography Chest Without and With Intravenous Contrast    CLINICAL HISTORY:     Reason for exam: worsening  hypoxia, covid.    TECHNIQUE:    Axial computed tomographic angiography images of the chest without and   with intravenous contrast.  CTDI is 13.7 mGy and DLP is 511.9 mGy-cm.    This CT exam was performed according to the principle of ALARA (As Low As   Reasonably Achievable) by using one or more of the following dose   reduction techniques: automated exposure control, adjustment of the mA   and or kV according to patient size, and or use of iterative   reconstruction technique.    MIP reconstructed images were created and reviewed.    COMPARISON:    Chest x-ray study of 10 25 2021.  Diffuse patchy airspace disease is   noted throughout both lungs compatible with Covid-19 pneumonia.    FINDINGS:    Pulmonary arteries:  No central pulmonary emboli.  Limited evaluation   of the peripheral branch of the pulmonary arteries which are grossly   unremarkable.    Aorta:  Mild dilatation of the ascending thoracic aorta which measures   approximate 3.7 cm in diameter.  Is there a history of etiology such as   hypertension question    Lungs:  The airway is normal.  No mass.    Pleural space:  Unremarkable.  No significant effusion.  No   pneumothorax.    Heart:  Unremarkable.  No cardiomegaly.  No significant pericardial   effusion.  No evidence of RV dysfunction.    Thyroid:  The thyroid gland is grossly unremarkable.    Bones joints:  No acute fracture.  No dislocation.    Soft tissues:  Unremarkable.    Lymph nodes:  Unremarkable.  No enlarged lymph nodes.    Liver:  Fatty infiltration of the liver.    Other findings:  Mild hypoaeration.    IMPRESSION:       1.  No central or peripheral pulmonary embolism.  2.  Hypoaeration.  3.  Diffuse patchy airspace disease is noted compatible with Covid-19   pneumonia.  4.  Mild dilatation of the ascending thoracic aorta.  Is there a history   of etiology such as hypertension?  Impression: Electronically signed by Se Thayer MD on 10-28-21 at 2326    I reviewed the patient's  daily medications.  Scheduled Medications  arformoterol, 15 mcg, Nebulization, BID - RT  cetirizine, 10 mg, Oral, Daily  dexamethasone, 6 mg, Oral, BID   Or  dexamethasone, 6 mg, Intravenous, BID  enoxaparin, 40 mg, Subcutaneous, Nightly  fluticasone, 1 spray, Nasal, Daily  gabapentin, 200 mg, Oral, Nightly  lidocaine, 1 patch, Transdermal, Q24H  pantoprazole, 40 mg, Oral, Q AM  polyethylene glycol, 17 g, Oral, Daily  senna-docusate sodium, 2 tablet, Oral, BID  sodium chloride, 2 spray, Each Nare, BID    Infusions   Diet  Diet Regular; Vegetarian; Lacto-Ovo: Allows Dairy Products & Eggs      Assessment/Plan     Active Hospital Problems    Diagnosis  POA   • **Pneumonia due to COVID-19 virus [U07.1, J12.82]  Yes   • Acute respiratory failure with hypoxia (HCC) [J96.01]  Yes   • Steroid-induced hyperglycemia [R73.9, T38.0X5A]  No   • Factor 5 Leiden mutation, heterozygous ([facility]) [D68.51]  Yes   • Raynaud phenomenon [I73.00]  Yes   • 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       46 y.o. female with history of chronic low back pain who was admitted with acute hypoxic respiratory failure due to COVID-19 pneumonia. She was not vaccinated. She has completed a 5-day course of remdesivir. CTA chest is negative for PE.    Covid 19 PNA with Acute Hypoxic Respiratory Failure:   -She has completed a 5-day course of remdesivir. She is on dexamethasone twice daily. Unfortunately, her hypoxia is getting worse. She is up to 9 L when I saw her this morning. Her inflammatory markers are all downtrending. She has elevated D-dimer but negative CTA chest.  -Negative pro-Guero, no antibiotics for now  -Continue supportive care. Wean oxygen as tolerated. Pulmonology added Brovana which she thinks is helping    Chronic low back pain:   -Continue neurontin. She has Norco as needed for pain. 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  -Add lidocaine patches today    Factor V Leiden: monitor  Asthma: No bronchospasm currently.  Bradycardia: No heart block on ekg. continue to monitor  Constipation: Continue bowel regimen.      · Lovenox 40 mg SC daily for DVT prophylaxis.  · Full code.  · Discussed with patient and nursing staff.  · Anticipate discharge home timing yet to be determined. Pending improvement in oxygenation      Vicki Owens DO  [facility]  10/30/21  16:03 EDT    Patient was placed in face mask on first look.  I wore protective equipment throughout this patient encounter including a face mask, gloves and protective eyewear.  Hand hygiene was performed before donning protective equipment and after removal when leaving the room.          Electronically signed by Vicki Owens DO at 10/30/21 1605     Caesar Guillen MD at 10/29/21 1371                                      Caesar Guillen MD                          [phone]      Patient ID:    Name:  Claudia Price    MRN:  [id number]    1975   46 y.o.  female            Patient Care Team:  Malika Magana CRNP as PCP - General (Family Medicine)    CC/ Reason for visit: Acute respiratory failure, acute COVID-19 pneumonia, acute back pain with sciatica    Subjective: Pt seen and examined this AM.  Noted some worsening oxygen needs up to 5 L this morning.  Got CT angiogram from primary which are reviewed and does not show evidence of PE but with evidence of diffuse pulmonary infiltrates consistent with Covid pneumonia.  No new fevers.  Mild elevation of ferritin noted back pain better    ROS: Denies any subjective fevers, syncope or presyncopal events, new neurological deficits, nausea or vomiting currently    Objective     Vital Signs past 24hrs    BP range: BP: (100-120)/(65-79) 100/69  Pulse range: Heart Rate:  [47-99] 87  Resp rate  range: Resp:  [16-20] 16  Temp range: Temp (24hrs), Av °F (36.7 °C), Min:97.7 °F (36.5 °C), Max:98.2 °F (36.8 °C)      Ventilator/Non-Invasive Ventilation Settings (From admission, onward)            None          Device (Oxygen Therapy): nasal cannula       65.8 kg (145 lb); Body mass index is 24.13 kg/m².      Intake/Output Summary (Last 24 hours) at 10/29/2021 1637  Last data filed at 10/29/2021 1422  Gross per 24 hour   Intake 360 ml   Output 2600 ml   Net -2240 ml       PHYSICAL EXAM   Constitutional: Middle aged pt in bed, No acute respiratory distress, + accessory muscle use  Head: - NCAT  Eyes: No pallor.  Anicteric sclerae, EOMI.  ENMT:  Mallampati 3, no oral thrush. Moist MM.   NECK: Trachea midline, No thyromegaly, no palpable cervical lymphadenopathy  Heart: RRR, no murmur. No pedal edema   Lungs: SPIKE +, No wheezes.  Occasional crackles heard    Abdomen: Soft. No tenderness, guarding or rigidity. No palpable masses  Extremities: Extremities warm and well perfused. No cyanosis/ clubbing  Neuro: Conscious, answers appropriately, no gross focal neuro deficits  Psych: Mood and affect -anxious    PPE recommended per [facility] infectious disease Isolation protocol for the current clinical scenario(as mentioned below) was followed.     Scheduled meds:  cetirizine, 10 mg, Oral, Daily  dexamethasone, 6 mg, Oral, BID   Or  dexamethasone, 6 mg, Intravenous, BID  enoxaparin, 40 mg, Subcutaneous, Nightly  fluticasone, 1 spray, Nasal, Daily  gabapentin, 200 mg, Oral, Nightly  ipratropium-albuterol, 3 mL, Nebulization, 4x Daily - RT  pantoprazole, 40 mg, Oral, Q AM  polyethylene glycol, 17 g, Oral, Daily  remdesivir, 100 mg, Intravenous, Q24H  senna-docusate sodium, 2 tablet, Oral, BID        IV meds:                           Data Review:      Results from last 7 days   Lab Units 10/29/21  0622 10/28/21  0611 10/27/21  0428 10/26/21  0606 10/26/21  0606 10/25/21  1347 10/25/21  1347 10/23/21  1744  10/23/21  1744   SODIUM mmol/L 140 142 142   < > 143   < > 140   < > 135*   POTASSIUM mmol/L 4.5 4.2 3.7   < > 4.2   < > 3.6   < > 3.3*   CHLORIDE mmol/L 104 106 108*   < > 107   < > 104   < > 100   CO2 mmol/L 24.8 24.0 23.8   < > 19.5*   < > 22.6   < > 22.2   BUN mg/dL 13 14 18   < > 12   < > 12   < > 9   CREATININE mg/dL 0.53* 0.47* 0.55*   < > 0.54*   < > 0.72   < > 0.67   CALCIUM mg/dL 9.1 8.6 8.4*   < > 8.8   < > 8.7   < > 7.9*   BILIRUBIN mg/dL 0.2 <0.2 <0.2   < > 0.2   < > 0.2   < > <0.2   ALK PHOS U/L 75 55 48   < > 55   < > 53   < > 49   ALT (SGPT) U/L 57* 30 30   < > 31   < > 26   < > 14   AST (SGOT) U/L 49* 31 37*   < > 49*   < > 47*   < > 30   GLUCOSE mg/dL 128* 116* 135*   < > 127*   < > 99   < > 125*   WBC 10*3/mm3 13.34* 12.75* 8.55   < > 1.99*   < > 3.41   < > 3.83   HEMOGLOBIN g/dL 13.1 11.9* 11.7*   < > 12.4   < > 12.3   < > 11.5*   PLATELETS 10*3/mm3 322 263 195   < > 150   < > 136*   < > 122*   PROBNP pg/mL 576.6*  --   --   --   --   --   --   --  60.7   PROCALCITONIN ng/mL  --  0.04  --   --  0.05  --  0.06  --   --     < > = values in this interval not displayed.       Lab Results   Component Value Date    CALCIUM 9.1 10/29/2021       Results from last 7 days   Lab Units 10/26/21  2008   BLOODCX  No growth at 2 days  No growth at 2 days     COVID LABS:  Results From Last 14 Days   Lab Units 10/29/21  0622 10/28/21  0611 10/27/21  0428 10/26/21  0606 10/26/21  0606 10/25/21  1347 10/23/21  1744   PROBNP pg/mL 576.6*  --   --   --   --   --  60.7   CRP mg/dL 0.40 0.51* 1.05*   < > 2.71*  --   --    D DIMER QUANT MCGFEU/mL  --  <0.27  --   --  0.30  --  0.38   FERRITIN ng/mL 782.00* 592.00* 667.00*  --   --   --   --    PROCALCITONIN ng/mL  --  0.04  --   --  0.05 0.06  --    TROPONIN T ng/mL  --   --   --   --   --   --  <0.010    < > = values in this interval not displayed.              Results Review:    I have reviewed the relevant laboratory results and independently reviewed the chest  imaging from this hospitalization including the available echocardiogram reports personally and summarized it if/ when appropriate below    Assessment    Acute hypoxic respiratory failure; severe  Acute COVID-19 pneumonia- Unvaccinated patient  Elevated inflammatory markers  Mild hepatitis  Sciatica     RECOMMENDATIONS:  Patient admitted some progress but now declining and is requiring higher oxygen needs.  Inflammatory markers mainly ferritin is trending in the wrong direction as well  CT angiogram ruled out pulmonary embolism.  Negative D-dimer noted  Continue with high-dose steroids and remdesivir  Continue with bronchodilators.  Does have some tremors but acceptable  We will keep the patient as negative as tolerated with spot diuretics.  Continue trend inflammatory markers  Back pain and sciatica per primary     DVT prophylaxis -emotional support provided    Caesar Guillen MD  10/29/2021    Electronically signed by Caesar Guillen MD at 10/29/21 1635       Consult Notes (last 72 hours)  Notes from 10/29/21 1543 through 11/01/21 1543   No notes of this type exist for this encounter.

## 2021-11-01 NOTE — PROGRESS NOTES
Caesar Guillen MD                          [phone]      Patient ID:    Name:  Claudia Price    MRN:  [id number]    1975   46 y.o.  female            Patient Care Team:  Malika Magana CRNP as PCP - General (Family Medicine)    CC/ Reason for visit: Acute respiratory failure, acute COVID-19 pneumonia, acute back pain with sciatica    Subjective: Pt seen and examined this AM.  States that she slept well and is feeling better this morning finally.  Back pain is improved as well.    ROS: Denies any subjective fevers, syncope or presyncopal events, new neurological deficits, nausea or vomiting currently    Objective     Vital Signs past 24hrs    BP range: BP: (106-142)/(75-96) 112/85  Pulse range: Heart Rate:  [] 100  Resp rate range: Resp:  [20] 20  Temp range: Temp (24hrs), Av.1 °F (36.7 °C), Min:97.8 °F (36.6 °C), Max:98.3 °F (36.8 °C)      Ventilator/Non-Invasive Ventilation Settings (From admission, onward)            None          Device (Oxygen Therapy): humidified; nasal cannula       65.8 kg (145 lb); Body mass index is 24.13 kg/m².      Intake/Output Summary (Last 24 hours) at 2021 1606  Last data filed at 2021 1353  Gross per 24 hour   Intake 550 ml   Output 2700 ml   Net -2150 ml       PHYSICAL EXAM   Constitutional: Middle aged pt in bed, No acute respiratory distress, + accessory muscle use  Head: - NCAT  Eyes: No pallor.  Anicteric sclerae, EOMI.  ENMT:  Mallampati 3, no oral thrush. Moist MM.   NECK: Trachea midline, No thyromegaly, no palpable cervical lymphadenopathy  Heart: RRR, no murmur. No pedal edema   Lungs: SPIKE +, No wheezes.  Occasional crackles heard    Abdomen: Soft. No tenderness, guarding or rigidity. No palpable masses  Extremities: Extremities warm and well perfused. No cyanosis/ clubbing  Neuro: Conscious, answers appropriately, no gross focal neuro deficits  Psych: Mood and affect -anxious    PPE recommended per  [facility] infectious disease Isolation protocol for the current clinical scenario(as mentioned below) was followed.     Scheduled meds:  arformoterol, 15 mcg, Nebulization, BID - RT  cetirizine, 10 mg, Oral, Daily  dexamethasone, 6 mg, Oral, BID  enoxaparin, 40 mg, Subcutaneous, Nightly  First Mouthwash (Magic Mouthwash), 5 mL, Swish & Spit, Q6H  fluticasone, 1 spray, Nasal, Daily  gabapentin, 200 mg, Oral, Nightly  lidocaine, 1 patch, Transdermal, Q24H  pantoprazole, 40 mg, Oral, Q AM  polyethylene glycol, 17 g, Oral, Daily  senna-docusate sodium, 2 tablet, Oral, BID  sodium chloride, 2 spray, Each Nare, BID        IV meds:                           Data Review:      Results from last 7 days   Lab Units 11/01/21  0459 10/31/21  0502 10/30/21  0718 10/29/21  0622 10/29/21  0622 10/28/21  0611 10/28/21  0611   SODIUM mmol/L 137  --  140  --  140   < > 142   POTASSIUM mmol/L 4.2  --  4.5  --  4.5   < > 4.2   CHLORIDE mmol/L 101  --  103  --  104   < > 106   CO2 mmol/L 24.4  --  24.1  --  24.8   < > 24.0   BUN mg/dL 16  --  11  --  13   < > 14   CREATININE mg/dL 0.66 0.60 0.48*   < > 0.53*   < > 0.47*   CALCIUM mg/dL 8.8  --  8.8  --  9.1   < > 8.6   BILIRUBIN mg/dL 0.4 0.3 0.2   < > 0.2   < > <0.2   ALK PHOS U/L 73 68 65   < > 75   < > 55   ALT (SGPT) U/L 92* 82* 59*   < > 57*   < > 30   AST (SGOT) U/L 58* 62* 52*   < > 49*   < > 31   GLUCOSE mg/dL 109*  --  107*  --  128*   < > 116*   WBC 10*3/mm3  --   --  10.24  --  13.34*  --  12.75*   HEMOGLOBIN g/dL  --   --  11.9*  --  13.1  --  11.9*   PLATELETS 10*3/mm3  --   --  320  --  322  --  263   PROBNP pg/mL  --   --   --   --  576.6*  --   --    PROCALCITONIN ng/mL 0.04  --  0.05  --   --   --  0.04    < > = values in this interval not displayed.       Lab Results   Component Value Date    CALCIUM 8.8 11/01/2021       Results from last 7 days   Lab Units 10/26/21  2008   BLOODCX  No growth at 5 days  No growth at 5 days     COVID LABS:  Results From Last 14  Days   Lab Units 11/01/21  0459 11/01/21  0458 10/31/21  0502 10/30/21  0836 10/30/21  0718 10/29/21  0622 10/29/21  0622 10/28/21  0611 10/28/21  0611 10/25/21  1347 10/23/21  1744   PROBNP pg/mL  --   --   --   --   --   --  576.6*  --   --   --  60.7   CRP mg/dL  --   --   --   --  <0.30  --  0.40  --  0.51*   < >  --    D DIMER QUANT MCGFEU/mL  --  0.40  --  0.38  --   --   --   --  <0.27   < > 0.38   FERRITIN ng/mL 591.00*  --  611.00*  --  639.00*   < > 782.00*   < > 592.00*   < >  --    PROCALCITONIN ng/mL 0.04  --   --   --  0.05  --   --   --  0.04   < >  --    TROPONIN T ng/mL  --   --   --   --   --   --   --   --   --   --  <0.010    < > = values in this interval not displayed.              Results Review:    I have reviewed the relevant laboratory results and independently reviewed the chest imaging from this hospitalization including the available echocardiogram reports personally and summarized it if/ when appropriate below    Assessment    Acute hypoxic respiratory failure; severe  Acute COVID-19 pneumonia- Unvaccinated patient  Elevated inflammatory markers  Mild hepatitis  Sciatica     RECOMMENDATIONS:  Patient still with significant supplemental oxygen. Inflammatory markers are plateauing and so is her clinical status. CT angiogram ruled out pulmonary embolism.   Continue with high-dose steroids and s/p remdesivir  Continue with bronchodilators.   We will keep the patient as negative as tolerated with spot diuretics.  Continue trend inflammatory markers  OOB as tolerated     DVT prophylaxis -emotional support provided    Caesar Guillen MD  11/1/2021

## 2021-11-02 PROBLEM — K12.30 MUCOSITIS ORAL: Status: ACTIVE | Noted: 2021-11-02

## 2021-11-02 LAB
ALBUMIN SERPL-MCNC: 3.8 G/DL (ref 3.5–5.2)
ALBUMIN/GLOB SERPL: 1.3 G/DL
ALP SERPL-CCNC: 85 U/L (ref 39–117)
ALT SERPL W P-5'-P-CCNC: 86 U/L (ref 1–33)
ANION GAP SERPL CALCULATED.3IONS-SCNC: 11.3 MMOL/L (ref 5–15)
AST SERPL-CCNC: 38 U/L (ref 1–32)
BASOPHILS # BLD AUTO: 0.04 10*3/MM3 (ref 0–0.2)
BASOPHILS NFR BLD AUTO: 0.3 % (ref 0–1.5)
BILIRUB SERPL-MCNC: 0.4 MG/DL (ref 0–1.2)
BUN SERPL-MCNC: 17 MG/DL (ref 6–20)
BUN/CREAT SERPL: 27.9 (ref 7–25)
CALCIUM SPEC-SCNC: 9.1 MG/DL (ref 8.6–10.5)
CHLORIDE SERPL-SCNC: 102 MMOL/L (ref 98–107)
CO2 SERPL-SCNC: 25.7 MMOL/L (ref 22–29)
CREAT SERPL-MCNC: 0.61 MG/DL (ref 0.57–1)
DEPRECATED RDW RBC AUTO: 40.6 FL (ref 37–54)
EOSINOPHIL # BLD AUTO: 0 10*3/MM3 (ref 0–0.4)
EOSINOPHIL NFR BLD AUTO: 0 % (ref 0.3–6.2)
ERYTHROCYTE [DISTWIDTH] IN BLOOD BY AUTOMATED COUNT: 13.2 % (ref 12.3–15.4)
FERRITIN SERPL-MCNC: 570 NG/ML (ref 13–150)
GFR SERPL CREATININE-BSD FRML MDRD: 105 ML/MIN/1.73
GLOBULIN UR ELPH-MCNC: 2.9 GM/DL
GLUCOSE SERPL-MCNC: 95 MG/DL (ref 65–99)
HCT VFR BLD AUTO: 42.2 % (ref 34–46.6)
HGB BLD-MCNC: 13.3 G/DL (ref 12–15.9)
IMM GRANULOCYTES # BLD AUTO: 0.39 10*3/MM3 (ref 0–0.05)
IMM GRANULOCYTES NFR BLD AUTO: 2.8 % (ref 0–0.5)
LYMPHOCYTES # BLD AUTO: 0.62 10*3/MM3 (ref 0.7–3.1)
LYMPHOCYTES NFR BLD AUTO: 4.4 % (ref 19.6–45.3)
MCH RBC QN AUTO: 26.9 PG (ref 26.6–33)
MCHC RBC AUTO-ENTMCNC: 31.5 G/DL (ref 31.5–35.7)
MCV RBC AUTO: 85.3 FL (ref 79–97)
MONOCYTES # BLD AUTO: 0.68 10*3/MM3 (ref 0.1–0.9)
MONOCYTES NFR BLD AUTO: 4.9 % (ref 5–12)
NEUTROPHILS NFR BLD AUTO: 12.22 10*3/MM3 (ref 1.7–7)
NEUTROPHILS NFR BLD AUTO: 87.6 % (ref 42.7–76)
NRBC BLD AUTO-RTO: 0 /100 WBC (ref 0–0.2)
PHOSPHATE SERPL-MCNC: 4.2 MG/DL (ref 2.5–4.5)
PLATELET # BLD AUTO: 421 10*3/MM3 (ref 140–450)
PMV BLD AUTO: 9.8 FL (ref 6–12)
POTASSIUM SERPL-SCNC: 4.2 MMOL/L (ref 3.5–5.2)
PROT SERPL-MCNC: 6.7 G/DL (ref 6–8.5)
RBC # BLD AUTO: 4.95 10*6/MM3 (ref 3.77–5.28)
SODIUM SERPL-SCNC: 139 MMOL/L (ref 136–145)
WBC # BLD AUTO: 13.95 10*3/MM3 (ref 3.4–10.8)

## 2021-11-02 PROCEDURE — 82728 ASSAY OF FERRITIN: CPT | Performed by: INTERNAL MEDICINE

## 2021-11-02 PROCEDURE — 94799 UNLISTED PULMONARY SVC/PX: CPT

## 2021-11-02 PROCEDURE — 85025 COMPLETE CBC W/AUTO DIFF WBC: CPT | Performed by: STUDENT IN AN ORGANIZED HEALTH CARE EDUCATION/TRAINING PROGRAM

## 2021-11-02 PROCEDURE — 63710000001 DEXAMETHASONE PER 0.25 MG: Performed by: INTERNAL MEDICINE

## 2021-11-02 PROCEDURE — 80053 COMPREHEN METABOLIC PANEL: CPT | Performed by: STUDENT IN AN ORGANIZED HEALTH CARE EDUCATION/TRAINING PROGRAM

## 2021-11-02 PROCEDURE — 25010000002 ENOXAPARIN PER 10 MG: Performed by: INTERNAL MEDICINE

## 2021-11-02 PROCEDURE — 84100 ASSAY OF PHOSPHORUS: CPT | Performed by: STUDENT IN AN ORGANIZED HEALTH CARE EDUCATION/TRAINING PROGRAM

## 2021-11-02 RX ADMIN — Medication 5 MG: at 22:08

## 2021-11-02 RX ADMIN — DIPHENHYDRAMINE HYDROCHLORIDE AND LIDOCAINE HYDROCHLORIDE AND ALUMINUM HYDROXIDE AND MAGNESIUM HYDRO 5 ML: KIT at 14:34

## 2021-11-02 RX ADMIN — CETIRIZINE HYDROCHLORIDE 10 MG: 10 TABLET ORAL at 09:26

## 2021-11-02 RX ADMIN — SODIUM CHLORIDE, PRESERVATIVE FREE 10 ML: 5 INJECTION INTRAVENOUS at 20:04

## 2021-11-02 RX ADMIN — ARFORMOTEROL TARTRATE 15 MCG: 15 SOLUTION RESPIRATORY (INHALATION) at 21:49

## 2021-11-02 RX ADMIN — LIDOCAINE 1 PATCH: 50 PATCH CUTANEOUS at 09:26

## 2021-11-02 RX ADMIN — SALINE NASAL SPRAY 2 SPRAY: 1.5 SOLUTION NASAL at 20:11

## 2021-11-02 RX ADMIN — PANTOPRAZOLE SODIUM 40 MG: 40 TABLET, DELAYED RELEASE ORAL at 05:52

## 2021-11-02 RX ADMIN — FLUTICASONE PROPIONATE 1 SPRAY: 50 SPRAY, METERED NASAL at 09:27

## 2021-11-02 RX ADMIN — DEXAMETHASONE 6 MG: 4 TABLET ORAL at 09:26

## 2021-11-02 RX ADMIN — DIPHENHYDRAMINE HYDROCHLORIDE AND LIDOCAINE HYDROCHLORIDE AND ALUMINUM HYDROXIDE AND MAGNESIUM HYDRO 5 ML: KIT at 05:52

## 2021-11-02 RX ADMIN — SALINE NASAL SPRAY 2 SPRAY: 1.5 SOLUTION NASAL at 09:30

## 2021-11-02 RX ADMIN — ARFORMOTEROL TARTRATE 15 MCG: 15 SOLUTION RESPIRATORY (INHALATION) at 08:06

## 2021-11-02 RX ADMIN — HYDROCODONE BITARTRATE AND ACETAMINOPHEN 1 TABLET: 5; 325 TABLET ORAL at 23:58

## 2021-11-02 RX ADMIN — DIPHENHYDRAMINE HYDROCHLORIDE AND LIDOCAINE HYDROCHLORIDE AND ALUMINUM HYDROXIDE AND MAGNESIUM HYDRO 5 ML: KIT at 22:09

## 2021-11-02 RX ADMIN — CYCLOBENZAPRINE 10 MG: 10 TABLET, FILM COATED ORAL at 22:08

## 2021-11-02 RX ADMIN — ENOXAPARIN SODIUM 40 MG: 40 INJECTION SUBCUTANEOUS at 20:04

## 2021-11-02 NOTE — PROGRESS NOTES
Caesar Guillen MD                          [phone]      Patient ID:    Name:  Claudia Price    MRN:  [id number]    1975   46 y.o.  female            Patient Care Team:  Malika Magana CRNP as PCP - General (Family Medicine)    CC/ Reason for visit: Acute respiratory failure, acute COVID-19 pneumonia, acute back pain with sciatica    Subjective: Pt seen and examined this AM.  States that she slept well and is feeling better this morning again.  Able to walk around with 3 L nasal cannula.  Happy with her progress.    ROS: Denies any subjective fevers, syncope or presyncopal events, new neurological deficits, nausea or vomiting currently    Objective     Vital Signs past 24hrs    BP range: BP: (107-119)/(69-85) 107/69  Pulse range: Heart Rate:  [] 97  Resp rate range: Resp:  [20] 20  Temp range: Temp (24hrs), Av.2 °F (36.8 °C), Min:97.9 °F (36.6 °C), Max:98.4 °F (36.9 °C)      Ventilator/Non-Invasive Ventilation Settings (From admission, onward)            None          Device (Oxygen Therapy): nasal cannula; humidified       65.8 kg (145 lb); Body mass index is 24.13 kg/m².      Intake/Output Summary (Last 24 hours) at 2021 1120  Last data filed at 2021 2329  Gross per 24 hour   Intake 400 ml   Output 1600 ml   Net -1200 ml       PHYSICAL EXAM   Constitutional: Middle aged pt in bed, No acute respiratory distress, + accessory muscle use  Head: - NCAT  Eyes: No pallor.  Anicteric sclerae, EOMI.  ENMT:  Mallampati 3, no oral thrush. Moist MM.   NECK: Trachea midline, No thyromegaly, no palpable cervical lymphadenopathy  Heart: RRR, no murmur. No pedal edema   Lungs: SPIKE +, No wheezes.  Occasional crackles heard    Abdomen: Soft. No tenderness, guarding or rigidity. No palpable masses  Extremities: Extremities warm and well perfused. No cyanosis/ clubbing  Neuro: Conscious, answers appropriately, no gross focal neuro deficits  Psych: Mood and  affect -anxious    PPE recommended per [facility] infectious disease Isolation protocol for the current clinical scenario(as mentioned below) was followed.     Scheduled meds:  arformoterol, 15 mcg, Nebulization, BID - RT  cetirizine, 10 mg, Oral, Daily  dexamethasone, 6 mg, Oral, BID  enoxaparin, 40 mg, Subcutaneous, Nightly  First Mouthwash (Magic Mouthwash), 5 mL, Swish & Spit, Q6H  fluticasone, 1 spray, Nasal, Daily  gabapentin, 200 mg, Oral, Nightly  lidocaine, 1 patch, Transdermal, Q24H  pantoprazole, 40 mg, Oral, Q AM  polyethylene glycol, 17 g, Oral, Daily  senna-docusate sodium, 2 tablet, Oral, BID  sodium chloride, 2 spray, Each Nare, BID        IV meds:                           Data Review:      Results from last 7 days   Lab Units 11/02/21  0606 11/01/21  0459 10/31/21  0502 10/30/21  0718 10/30/21  0718 10/29/21  0622 10/29/21  0622 10/28/21  0611 10/28/21  0611   SODIUM mmol/L 139 137  --   --  140   < > 140   < > 142   POTASSIUM mmol/L 4.2 4.2  --   --  4.5   < > 4.5   < > 4.2   CHLORIDE mmol/L 102 101  --   --  103   < > 104   < > 106   CO2 mmol/L 25.7 24.4  --   --  24.1   < > 24.8   < > 24.0   BUN mg/dL 17 16  --   --  11   < > 13   < > 14   CREATININE mg/dL 0.61 0.66 0.60   < > 0.48*   < > 0.53*   < > 0.47*   CALCIUM mg/dL 9.1 8.8  --   --  8.8   < > 9.1   < > 8.6   BILIRUBIN mg/dL 0.4 0.4 0.3   < > 0.2   < > 0.2   < > <0.2   ALK PHOS U/L 85 73 68   < > 65   < > 75   < > 55   ALT (SGPT) U/L 86* 92* 82*   < > 59*   < > 57*   < > 30   AST (SGOT) U/L 38* 58* 62*   < > 52*   < > 49*   < > 31   GLUCOSE mg/dL 95 109*  --   --  107*   < > 128*   < > 116*   WBC 10*3/mm3 13.95*  --   --   --  10.24  --  13.34*   < > 12.75*   HEMOGLOBIN g/dL 13.3  --   --   --  11.9*  --  13.1   < > 11.9*   PLATELETS 10*3/mm3 421  --   --   --  320  --  322   < > 263   PROBNP pg/mL  --   --   --   --   --   --  576.6*  --   --    PROCALCITONIN ng/mL  --  0.04  --   --  0.05  --   --   --  0.04    < > = values in this  interval not displayed.       Lab Results   Component Value Date    CALCIUM 9.1 11/02/2021    PHOS 4.2 11/02/2021       Results from last 7 days   Lab Units 10/26/21  2008   BLOODCX  No growth at 5 days  No growth at 5 days     COVID LABS:  Results From Last 14 Days   Lab Units 11/02/21  0606 11/01/21  0459 11/01/21  0458 10/31/21  0502 10/30/21  0836 10/30/21  0718 10/30/21  0718 10/29/21  0622 10/29/21  0622 10/28/21  0611 10/28/21  0611 10/25/21  1347 10/23/21  1744   PROBNP pg/mL  --   --   --   --   --   --   --   --  576.6*  --   --   --  60.7   CRP mg/dL  --   --   --   --   --   --  <0.30  --  0.40  --  0.51*   < >  --    D DIMER QUANT MCGFEU/mL  --   --  0.40  --  0.38  --   --   --   --   --  <0.27   < > 0.38   FERRITIN ng/mL 570.00* 591.00*  --  611.00*  --    < > 639.00*   < > 782.00*   < > 592.00*   < >  --    PROCALCITONIN ng/mL  --  0.04  --   --   --   --  0.05  --   --   --  0.04   < >  --    TROPONIN T ng/mL  --   --   --   --   --   --   --   --   --   --   --   --  <0.010    < > = values in this interval not displayed.              Results Review:    I have reviewed the relevant laboratory results and independently reviewed the chest imaging from this hospitalization including the available echocardiogram reports personally and summarized it if/ when appropriate below    Assessment    Acute hypoxic respiratory failure; severe  Acute COVID-19 pneumonia- Unvaccinated patient  Elevated inflammatory markers  Mild hepatitis  Sciatica     RECOMMENDATIONS:  Patient is doing better and is on 3 L nasal cannula now and able to walk around the room. Inflammatory markers are improving. CT angiogram ruled out pulmonary embolism.   Continue with steroids and bronchodilators.   We will keep the patient as negative as tolerated with spot diuretics.  Continue trend inflammatory markers  OOB as tolerated     DVT prophylaxis     Discussed with the nurse.  Patient should be able to be discharged on supplemental  oxygen from my standpoint.  She will need to follow-up with primary care in a week or 2 to evaluate for need of supplemental oxygen.    Caesar Guillen MD  11/2/2021

## 2021-11-02 NOTE — PLAN OF CARE
Goal Outcome Evaluation:              Outcome Summary: O2 at 4LNC; sats 95-96% when pt lies prone.  otherwise up in chair most of the day; desats to 86% with exertion; anxious at times, hoping to go home tomorrow; pt reports frequent use of IS.  Back pain improved; pt not requiring or pain meds nor Flexeril

## 2021-11-02 NOTE — PROGRESS NOTES
Name: Claudia Price ADMIT: 10/25/2021   : 1975  PCP: Malika Magana CRNP    MRN: [id number] LOS: 8 days   AGE/SEX: 46 y.o. female  ROOM: [facility]     Subjective   Subjective   Patient eager to go home but also still short of breath with minimal exertion.  Amenable to staying until tomorrow  Does not feel that gabapentin is helping with her back pain, requests to stop    Review of Systems  Denies chest pain, fevers, abdominal pain, diarrhea     Objective   Objective   Vital Signs  Temp:  [97.9 °F (36.6 °C)-98.4 °F (36.9 °C)] 97.9 °F (36.6 °C)  Heart Rate:  [] 97  Resp:  [20] 20  BP: (107-119)/(69-85) 107/69  SpO2:  [86 %-97 %] 90 %  on  Flow (L/min):  [3-5] 3;   Device (Oxygen Therapy): nasal cannula; humidified  Body mass index is 24.13 kg/m².  Physical Exam  Constitutional:       General: She is not in acute distress.     Appearance: She is not diaphoretic.   Cardiovascular:      Rate and Rhythm: Normal rate and regular rhythm.      Pulses: Normal pulses.      Heart sounds: No murmur heard.  No gallop.    Pulmonary:      Effort: Pulmonary effort is normal. No respiratory distress.      Breath sounds: No wheezing or rhonchi.   Abdominal:      Palpations: Abdomen is soft.      Tenderness: There is no abdominal tenderness. There is no guarding.   Skin:     General: Skin is warm and dry.   Neurological:      Mental Status: She is alert.         Results Review     I reviewed the patient's new clinical results.  Results from last 7 days   Lab Units 21  0606 10/30/21  0718 10/29/21  0622 10/28/21  0611   WBC 10*3/mm3 13.95* 10.24 13.34* 12.75*   HEMOGLOBIN g/dL 13.3 11.9* 13.1 11.9*   PLATELETS 10*3/mm3 421 320 322 263     Results from last 7 days   Lab Units 21  0606 21  0459 10/31/21  0502 10/30/21  0718 10/29/21  0622 10/29/21  0622   SODIUM mmol/L 139 137  --  140  --  140   POTASSIUM mmol/L 4.2 4.2  --  4.5  --  4.5   CHLORIDE mmol/L 102 101  --  103  --  104   CO2 mmol/L  25.7 24.4  --  24.1  --  24.8   BUN mg/dL 17 16  --  11  --  13   CREATININE mg/dL 0.61 0.66 0.60 0.48*   < > 0.53*   GLUCOSE mg/dL 95 109*  --  107*  --  128*    < > = values in this interval not displayed.   Estimated Creatinine Clearance: 118.4 mL/min (by C-G formula based on SCr of 0.61 mg/dL).  Results from last 7 days   Lab Units 11/02/21  0606 11/01/21  0459 10/31/21  0502 10/30/21  0718   ALBUMIN g/dL 3.80 3.50 3.60 3.40*   BILIRUBIN mg/dL 0.4 0.4 0.3 0.2   ALK PHOS U/L 85 73 68 65   AST (SGOT) U/L 38* 58* 62* 52*   ALT (SGPT) U/L 86* 92* 82* 59*     Results from last 7 days   Lab Units 11/02/21  0606 11/01/21  0459 10/31/21  0502 10/30/21  0718 10/29/21  0622 10/29/21  0622   CALCIUM mg/dL 9.1 8.8  --  8.8  --  9.1   ALBUMIN g/dL 3.80 3.50 3.60 3.40*   < > 3.70   PHOSPHORUS mg/dL 4.2  --   --   --   --   --     < > = values in this interval not displayed.     Results from last 7 days   Lab Units 11/01/21  0459 10/30/21  0718 10/28/21  0611   PROCALCITONIN ng/mL 0.04 0.05 0.04     SARS-CoV-2, CHEMA   Date Value Ref Range Status   10/19/2021 Detected (A) Not Detected Final     Comment:     Patients who have a positive COVID-19 test result may now have  treatment options. Treatment options are available for patients  with mild to moderate symptoms and for hospitalized patients.  Visit our website at [email or web address] for  resources and information.  This nucleic acid amplification test was developed and its performance  characteristics determined by [facility]. Nucleic acid  amplification tests include RT-PCR and TMA. This test has not been  FDA cleared or approved. This test has been authorized by FDA under  an Emergency Use Authorization (EUA). This test is only authorized  for the duration of time the declaration that circumstances exist  justifying the authorization of the emergency use of in vitro  diagnostic tests for detection of SARS-CoV-2 virus and/or diagnosis  of COVID-19  infection under section 564(b)(1) of the Act, 21 U.S.C.  360bbb-3(b) (1), unless the authorization is terminated or revoked  sooner.  When diagnostic testing is negative, the possibility of a false  negative result should be considered in the context of a patient's  recent exposures and the presence of clinical signs and symptoms  consistent with COVID-19. An individual without symptoms of COVID-19  and who is not shedding SARS-CoV-2 virus would expect to have a  negative (not detected) result in this assay.   12/24/2020 Not Detected Not Detected Final     Comment:     This nucleic acid amplification test was developed and its performance  characteristics determined by [facility] [employer]. Nucleic acid  amplification tests include PCR and TMA. This test has not been FDA  cleared or approved. This test has been authorized by FDA under an  Emergency Use Authorization (EUA). This test is only authorized for  the duration of time the declaration that circumstances exist  justifying the authorization of the emergency use of in vitro  diagnostic tests for detection of SARS-CoV-2 virus and/or diagnosis  of COVID-19 infection under section 564(b)(1) of the Act, 21 U.S.C.  360bbb-3(b) (1), unless the authorization is terminated or revoked  sooner.  When diagnostic testing is negative, the possibility of a false  negative result should be considered in the context of a patient's  recent exposures and the presence of clinical signs and symptoms  consistent with COVID-19. An individual without symptoms of COVID-19  and who is not shedding SARS-CoV-2 virus would expect to have a  negative (not detected) result in this assay.     No results found for: HGBA1C, POCGLU    XR Chest 1 View  Narrative: SINGLE VIEW OF THE CHEST     HISTORY: COVID     COMPARISON: 10/25/2021     FINDINGS:  Bilateral infiltrates are unchanged when compared to prior exam. Heart  size is within normal limits. No pneumothorax or pleural effusion is  seen.      Impression: No significant interval change.     This report was finalized on 10/31/2021 5:48 AM by Dr. Jaida Proctor M.D.       I reviewed the patient's daily medications.  Scheduled Medications  arformoterol, 15 mcg, Nebulization, BID - RT  cetirizine, 10 mg, Oral, Daily  [START ON 11/3/2021] dexamethasone, 6 mg, Oral, Daily With Breakfast  enoxaparin, 40 mg, Subcutaneous, Nightly  First Mouthwash (Magic Mouthwash), 5 mL, Swish & Spit, Q6H  fluticasone, 1 spray, Nasal, Daily  lidocaine, 1 patch, Transdermal, Q24H  pantoprazole, 40 mg, Oral, Q AM  polyethylene glycol, 17 g, Oral, Daily  senna-docusate sodium, 2 tablet, Oral, BID  sodium chloride, 2 spray, Each Nare, BID    Infusions   Diet  Diet Regular; Vegetarian; Lacto-Ovo: Allows Dairy Products & Eggs       Assessment/Plan     Active Hospital Problems    Diagnosis  POA   • **Pneumonia due to COVID-19 virus [U07.1, J12.82]  Yes   • Mucositis oral [K12.30]  No   • Elevated LFTs [R79.89]  Yes   • Acute respiratory failure with hypoxia (HCC) [J96.01]  Yes   • Steroid-induced hyperglycemia [R73.9, T38.0X5A]  No   • Factor 5 Leiden mutation, heterozygous (HCC) [D68.51]  Yes   • Raynaud phenomenon [I73.00]  Yes   • 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       46 y.o. female with history of chronic low back pain who was admitted with acute hypoxic respiratory failure due to COVID-19 pneumonia. She was not vaccinated. She has completed a 5-day course of remdesivir. CTA chest is negative for PE.    Covid 19 PNA with Acute Hypoxic Respiratory Failure:   -She has completed a 5-day course of remdesivir. She is on dexamethasone, will decrease to daily today. Her inflammatory markers continue to improve. She had elevated D-dimer but negative CTA chest.  -Negative pro-Guero, no antibiotics for now  -Continue supportive care. Wean  oxygen as tolerated.  Anticipate home tomorrow with home oxygen    Chronic low back pain:   -Stop neurontin due to little improvement. She has Norco as needed for pain.   -Continue lidocaine patch    Mucositis  -Sophie's Magic mouthwash    Elevated LFTs  - mild and likely due to COVID. Recommend rechecking as outpatient    Factor V Leiden: monitor  Asthma: No bronchospasm currently.  Bradycardia: No heart block on ekg. continue to monitor  Constipation: Continue bowel regimen.      · Lovenox 40 mg SC daily for DVT prophylaxis.  · Full code.  · Discussed with patient  · Anticipate discharge home tomorrow with home oxygen      Vicki Owens DO  [facility]  11/02/21  13:51 EDT    Patient was placed in face mask on first look.  I wore protective equipment throughout this patient encounter including a face mask, gloves and protective eyewear.  Hand hygiene was performed before donning protective equipment and after removal when leaving the room.

## 2021-11-02 NOTE — CONSULTS
I was requested to see patient to provide emotional and spiritual support.  The patient was glad to have some company and to have someone to talk to regarding some of anxiety.  We had a nice conversation.  She is a member of [employer] [employer].  Her [employer] family has been sending her emails and verses to provide her support.  She is looking forward to being discharged, it has been a tough journey for her.  She is very open to prayer and we concluded our time with prayer.

## 2021-11-02 NOTE — PLAN OF CARE
Problem: Adult Inpatient Plan of Care  Goal: Plan of Care Review  11/2/2021 0341 by Sandy Rich RN  Outcome: Ongoing, Progressing  Flowsheets (Taken 11/2/2021 0341)  Outcome Summary: PATIENT HAS O2 @4 LITERS VIA NC, TOLERATED 4 LITERS, ABLE TO AMBULATE TO BATHROOM WITHOUT DISTRESS NOTED, FALL PRECAUTIONS MAINTAINED, UP AT BERNIE, MEDICATED WITH FLEXERIL FOR BACK PAIN PRN.  11/2/2021 0341 by Sandy Rich RN  Outcome: Ongoing, Progressing  Flowsheets (Taken 11/2/2021 0341)  Progress: improving  Plan of Care Reviewed With: patient  Outcome Summary: PATIENT HAS O2 @4 LITERS VIA NC, TOLERATED 4 LITERS, ABLE TO AMBULATE TO BATHROOM WITHOUT DISTRESS NOTED, FALL PRECAUTIONS MAINTAINED, UP AT BERNIE, MEDICATED WITH FLEXERIL FOR BACK PAIN PRN.  Goal: Patient-Specific Goal (Individualized)  11/2/2021 0341 by Sandy Rich RN  Outcome: Ongoing, Progressing  11/2/2021 0341 by Sandy Rich RN  Outcome: Ongoing, Progressing  Goal: Absence of Hospital-Acquired Illness or Injury  11/2/2021 0341 by Sandy Rich RN  Outcome: Ongoing, Progressing  11/2/2021 0341 by Sadny Rich RN  Outcome: Ongoing, Progressing  Intervention: Identify and Manage Fall Risk  Recent Flowsheet Documentation  Taken 11/2/2021 0037 by Sandy Rich RN  Safety Promotion/Fall Prevention:   assistive device/personal items within reach   clutter free environment maintained   fall prevention program maintained   lighting adjusted   nonskid shoes/slippers when out of bed   safety round/check completed  Taken 11/1/2021 2034 by Sandy Rich RN  Safety Promotion/Fall Prevention:   assistive device/personal items within reach   clutter free environment maintained   fall prevention program maintained   lighting adjusted   nonskid shoes/slippers when out of bed   safety round/check completed   room organization consistent  Intervention: Prevent Skin Injury  Recent Flowsheet Documentation  Taken 11/2/2021 0037 by  Sandy Rich RN  Body Position:   position changed independently   supine  Skin Protection:   tubing/devices free from skin contact   adhesive use limited   drying agents applied   electrode sites changed   incontinence pads utilized   transparent dressing maintained  Taken 11/1/2021 2034 by Sandy Rich RN  Body Position:   position changed independently   supine  Intervention: Prevent and Manage VTE (venous thromboembolism) Risk  Recent Flowsheet Documentation  Taken 11/2/2021 0037 by Sandy Rich RN  VTE Prevention/Management: (lovenox given) other (see comments)  Taken 11/1/2021 2034 by Sandy Rich RN  VTE Prevention/Management: (lovenox given) other (see comments)  Intervention: Prevent Infection  Recent Flowsheet Documentation  Taken 11/2/2021 0037 by Sandy Rich RN  Infection Prevention:   personal protective equipment utilized   rest/sleep promoted   equipment surfaces disinfected   hand hygiene promoted  Taken 11/1/2021 2034 by Sandy Rich RN  Infection Prevention:   hand hygiene promoted   equipment surfaces disinfected   rest/sleep promoted  Goal: Optimal Comfort and Wellbeing  11/2/2021 0341 by Sandy Rich RN  Outcome: Ongoing, Progressing  11/2/2021 0341 by Sandy Rich RN  Outcome: Ongoing, Progressing  Intervention: Provide Person-Centered Care  Recent Flowsheet Documentation  Taken 11/2/2021 0037 by Sandy Rich RN  Trust Relationship/Rapport:   care explained   emotional support provided  Taken 11/1/2021 2034 by Sandy Rich RN  Trust Relationship/Rapport:   care explained   choices provided   emotional support provided  Goal: Readiness for Transition of Care  11/2/2021 0341 by Sandy Rich RN  Outcome: Ongoing, Progressing  11/2/2021 0341 by Sandy Rich RN  Outcome: Ongoing, Progressing     Problem: Pain Chronic (Persistent)  Goal: Acceptable Pain Control and Functional Ability  11/2/2021 0341 by  Sandy Rich RN  Outcome: Ongoing, Progressing  11/2/2021 0341 by Sandy Rich RN  Outcome: Ongoing, Progressing  Intervention: Manage Persistent Pain  Recent Flowsheet Documentation  Taken 11/2/2021 0037 by Sandy Rich RN  Medication Review/Management: medications reviewed  Taken 11/1/2021 2034 by Sandy Rich RN  Medication Review/Management: medications reviewed  Intervention: Optimize Psychosocial Wellbeing  Recent Flowsheet Documentation  Taken 11/2/2021 0037 by Sandy Rich RN  Supportive Measures:   active listening utilized   verbalization of feelings encouraged   self-care encouraged  Taken 11/1/2021 2034 by Sandy Rich RN  Supportive Measures:   active listening utilized   verbalization of feelings encouraged     Problem: Breathing Pattern Ineffective  Goal: Effective Breathing Pattern  11/2/2021 0341 by Sandy Rich RN  Outcome: Ongoing, Progressing  11/2/2021 0341 by Sandy Rich RN  Outcome: Ongoing, Progressing  Intervention: Promote Improved Breathing Pattern  Recent Flowsheet Documentation  Taken 11/2/2021 0037 by Snady Rich RN  Supportive Measures:   active listening utilized   verbalization of feelings encouraged   self-care encouraged  Head of Bed (HOB): HOB flat  Taken 11/1/2021 2034 by Sandy Rich RN  Supportive Measures:   active listening utilized   verbalization of feelings encouraged  Head of Bed (HOB): HOB flat   Goal Outcome Evaluation:  Plan of Care Reviewed With: patient        Progress: improving

## 2021-11-02 NOTE — CASE MANAGEMENT/SOCIAL WORK
Continued Stay Note  [facility]     Patient Name: Claudia Price  MRN: [id number]  Today's Date: 11/2/2021    Admit Date: 10/25/2021     Discharge Plan     Row Name 11/02/21 0939       Plan    Plan Home with family    Plan Comments Patient is on 3 L NC and ad eve in her room. Plans remain to DC home with family when stable. CCP following and will assist with home oxygen if needed. Megan Valadez RN CCP               Discharge Codes    No documentation.                     Megan Valadez RN

## 2021-11-03 PROBLEM — D89.832 CYTOKINE RELEASE SYNDROME, GRADE 2: Status: ACTIVE | Noted: 2021-11-03

## 2021-11-03 LAB
ALBUMIN SERPL-MCNC: 3.8 G/DL (ref 3.5–5.2)
ALBUMIN/GLOB SERPL: 1.2 G/DL
ALP SERPL-CCNC: 85 U/L (ref 39–117)
ALT SERPL W P-5'-P-CCNC: 63 U/L (ref 1–33)
ANION GAP SERPL CALCULATED.3IONS-SCNC: 12.9 MMOL/L (ref 5–15)
AST SERPL-CCNC: 31 U/L (ref 1–32)
BASOPHILS # BLD AUTO: 0.08 10*3/MM3 (ref 0–0.2)
BASOPHILS NFR BLD AUTO: 0.5 % (ref 0–1.5)
BILIRUB SERPL-MCNC: 0.9 MG/DL (ref 0–1.2)
BUN SERPL-MCNC: 14 MG/DL (ref 6–20)
BUN/CREAT SERPL: 22.2 (ref 7–25)
CALCIUM SPEC-SCNC: 9.1 MG/DL (ref 8.6–10.5)
CHLORIDE SERPL-SCNC: 99 MMOL/L (ref 98–107)
CO2 SERPL-SCNC: 26.1 MMOL/L (ref 22–29)
CREAT SERPL-MCNC: 0.63 MG/DL (ref 0.57–1)
D DIMER PPP FEU-MCNC: 0.63 MCGFEU/ML (ref 0–0.49)
DEPRECATED RDW RBC AUTO: 39.6 FL (ref 37–54)
EOSINOPHIL # BLD AUTO: 0.09 10*3/MM3 (ref 0–0.4)
EOSINOPHIL NFR BLD AUTO: 0.5 % (ref 0.3–6.2)
ERYTHROCYTE [DISTWIDTH] IN BLOOD BY AUTOMATED COUNT: 12.8 % (ref 12.3–15.4)
FERRITIN SERPL-MCNC: 745 NG/ML (ref 13–150)
GFR SERPL CREATININE-BSD FRML MDRD: 101 ML/MIN/1.73
GLOBULIN UR ELPH-MCNC: 3.3 GM/DL
GLUCOSE SERPL-MCNC: 66 MG/DL (ref 65–99)
HCT VFR BLD AUTO: 44.5 % (ref 34–46.6)
HGB BLD-MCNC: 14.3 G/DL (ref 12–15.9)
IMM GRANULOCYTES # BLD AUTO: 0.44 10*3/MM3 (ref 0–0.05)
IMM GRANULOCYTES NFR BLD AUTO: 2.7 % (ref 0–0.5)
LYMPHOCYTES # BLD AUTO: 0.87 10*3/MM3 (ref 0.7–3.1)
LYMPHOCYTES NFR BLD AUTO: 5.3 % (ref 19.6–45.3)
MCH RBC QN AUTO: 27.6 PG (ref 26.6–33)
MCHC RBC AUTO-ENTMCNC: 32.1 G/DL (ref 31.5–35.7)
MCV RBC AUTO: 85.7 FL (ref 79–97)
MONOCYTES # BLD AUTO: 0.56 10*3/MM3 (ref 0.1–0.9)
MONOCYTES NFR BLD AUTO: 3.4 % (ref 5–12)
NEUTROPHILS NFR BLD AUTO: 14.47 10*3/MM3 (ref 1.7–7)
NEUTROPHILS NFR BLD AUTO: 87.6 % (ref 42.7–76)
NRBC BLD AUTO-RTO: 0 /100 WBC (ref 0–0.2)
PLATELET # BLD AUTO: 359 10*3/MM3 (ref 140–450)
PMV BLD AUTO: 10 FL (ref 6–12)
POTASSIUM SERPL-SCNC: 3.4 MMOL/L (ref 3.5–5.2)
PROCALCITONIN SERPL-MCNC: 0.07 NG/ML (ref 0–0.25)
PROT SERPL-MCNC: 7.1 G/DL (ref 6–8.5)
RBC # BLD AUTO: 5.19 10*6/MM3 (ref 3.77–5.28)
SODIUM SERPL-SCNC: 138 MMOL/L (ref 136–145)
WBC # BLD AUTO: 16.51 10*3/MM3 (ref 3.4–10.8)

## 2021-11-03 PROCEDURE — 94799 UNLISTED PULMONARY SVC/PX: CPT

## 2021-11-03 PROCEDURE — 80053 COMPREHEN METABOLIC PANEL: CPT | Performed by: STUDENT IN AN ORGANIZED HEALTH CARE EDUCATION/TRAINING PROGRAM

## 2021-11-03 PROCEDURE — 36415 COLL VENOUS BLD VENIPUNCTURE: CPT | Performed by: INTERNAL MEDICINE

## 2021-11-03 PROCEDURE — 85379 FIBRIN DEGRADATION QUANT: CPT | Performed by: INTERNAL MEDICINE

## 2021-11-03 PROCEDURE — 63710000001 DEXAMETHASONE PER 0.25 MG: Performed by: STUDENT IN AN ORGANIZED HEALTH CARE EDUCATION/TRAINING PROGRAM

## 2021-11-03 PROCEDURE — 25010000002 ENOXAPARIN PER 10 MG: Performed by: INTERNAL MEDICINE

## 2021-11-03 PROCEDURE — 85025 COMPLETE CBC W/AUTO DIFF WBC: CPT | Performed by: STUDENT IN AN ORGANIZED HEALTH CARE EDUCATION/TRAINING PROGRAM

## 2021-11-03 PROCEDURE — 82728 ASSAY OF FERRITIN: CPT | Performed by: INTERNAL MEDICINE

## 2021-11-03 PROCEDURE — 84145 PROCALCITONIN (PCT): CPT | Performed by: INTERNAL MEDICINE

## 2021-11-03 RX ORDER — MAGNESIUM SULFATE HEPTAHYDRATE 40 MG/ML
4 INJECTION, SOLUTION INTRAVENOUS AS NEEDED
Status: DISCONTINUED | OUTPATIENT
Start: 2021-11-03 | End: 2021-11-08 | Stop reason: HOSPADM

## 2021-11-03 RX ORDER — HYDROCODONE BITARTRATE AND ACETAMINOPHEN 5; 325 MG/1; MG/1
1 TABLET ORAL EVERY 4 HOURS PRN
Qty: 12 TABLET | Refills: 0 | Status: SHIPPED | OUTPATIENT
Start: 2021-11-03

## 2021-11-03 RX ORDER — POTASSIUM CHLORIDE 750 MG/1
40 TABLET, FILM COATED, EXTENDED RELEASE ORAL AS NEEDED
Status: DISCONTINUED | OUTPATIENT
Start: 2021-11-03 | End: 2021-11-08 | Stop reason: HOSPADM

## 2021-11-03 RX ORDER — MAGNESIUM SULFATE HEPTAHYDRATE 40 MG/ML
2 INJECTION, SOLUTION INTRAVENOUS AS NEEDED
Status: DISCONTINUED | OUTPATIENT
Start: 2021-11-03 | End: 2021-11-08 | Stop reason: HOSPADM

## 2021-11-03 RX ORDER — LIDOCAINE 50 MG/G
1 PATCH TOPICAL
Qty: 5 PATCH | Refills: 0 | Status: SHIPPED | OUTPATIENT
Start: 2021-11-04

## 2021-11-03 RX ORDER — GABAPENTIN 100 MG/1
200 CAPSULE ORAL NIGHTLY
Status: DISCONTINUED | OUTPATIENT
Start: 2021-11-03 | End: 2021-11-08 | Stop reason: HOSPADM

## 2021-11-03 RX ORDER — PANTOPRAZOLE SODIUM 40 MG/1
40 TABLET, DELAYED RELEASE ORAL DAILY
Qty: 14 TABLET | Refills: 0 | Status: SHIPPED | OUTPATIENT
Start: 2021-11-03 | End: 2021-11-17

## 2021-11-03 RX ORDER — DEXAMETHASONE 2 MG/1
TABLET ORAL
Qty: 24 TABLET | Refills: 0 | Status: SHIPPED | OUTPATIENT
Start: 2021-11-03

## 2021-11-03 RX ORDER — CYCLOBENZAPRINE HCL 10 MG
10 TABLET ORAL 3 TIMES DAILY PRN
Qty: 18 TABLET | Refills: 0 | Status: SHIPPED | OUTPATIENT
Start: 2021-11-03

## 2021-11-03 RX ORDER — GABAPENTIN 100 MG/1
200 CAPSULE ORAL NIGHTLY
Qty: 6 CAPSULE | Refills: 0 | Status: SHIPPED | OUTPATIENT
Start: 2021-11-03 | End: 2021-11-06

## 2021-11-03 RX ORDER — POTASSIUM CHLORIDE 1.5 G/1.77G
40 POWDER, FOR SOLUTION ORAL AS NEEDED
Status: DISCONTINUED | OUTPATIENT
Start: 2021-11-03 | End: 2021-11-08 | Stop reason: HOSPADM

## 2021-11-03 RX ADMIN — DEXAMETHASONE 6 MG: 4 TABLET ORAL at 08:54

## 2021-11-03 RX ADMIN — HYDROCODONE BITARTRATE AND ACETAMINOPHEN 1 TABLET: 5; 325 TABLET ORAL at 04:22

## 2021-11-03 RX ADMIN — ARFORMOTEROL TARTRATE 15 MCG: 15 SOLUTION RESPIRATORY (INHALATION) at 07:50

## 2021-11-03 RX ADMIN — GABAPENTIN 200 MG: 100 CAPSULE ORAL at 20:23

## 2021-11-03 RX ADMIN — Medication 5 MG: at 22:20

## 2021-11-03 RX ADMIN — POTASSIUM CHLORIDE 40 MEQ: 750 TABLET, EXTENDED RELEASE ORAL at 22:21

## 2021-11-03 RX ADMIN — CETIRIZINE HYDROCHLORIDE 10 MG: 10 TABLET ORAL at 08:56

## 2021-11-03 RX ADMIN — SALINE NASAL SPRAY 2 SPRAY: 1.5 SOLUTION NASAL at 08:59

## 2021-11-03 RX ADMIN — SALINE NASAL SPRAY 2 SPRAY: 1.5 SOLUTION NASAL at 20:25

## 2021-11-03 RX ADMIN — DOCUSATE SODIUM 50MG AND SENNOSIDES 8.6MG 2 TABLET: 8.6; 5 TABLET, FILM COATED ORAL at 20:23

## 2021-11-03 RX ADMIN — CYCLOBENZAPRINE 10 MG: 10 TABLET, FILM COATED ORAL at 08:56

## 2021-11-03 RX ADMIN — DOCUSATE SODIUM 50MG AND SENNOSIDES 8.6MG 2 TABLET: 8.6; 5 TABLET, FILM COATED ORAL at 08:56

## 2021-11-03 RX ADMIN — HYDROCODONE BITARTRATE AND ACETAMINOPHEN 1 TABLET: 5; 325 TABLET ORAL at 11:13

## 2021-11-03 RX ADMIN — POTASSIUM CHLORIDE 40 MEQ: 750 TABLET, EXTENDED RELEASE ORAL at 16:39

## 2021-11-03 RX ADMIN — CYCLOBENZAPRINE 10 MG: 10 TABLET, FILM COATED ORAL at 22:20

## 2021-11-03 RX ADMIN — ENOXAPARIN SODIUM 40 MG: 40 INJECTION SUBCUTANEOUS at 20:23

## 2021-11-03 RX ADMIN — CYCLOBENZAPRINE 10 MG: 10 TABLET, FILM COATED ORAL at 16:38

## 2021-11-03 RX ADMIN — PANTOPRAZOLE SODIUM 40 MG: 40 TABLET, DELAYED RELEASE ORAL at 08:58

## 2021-11-03 RX ADMIN — ARFORMOTEROL TARTRATE 15 MCG: 15 SOLUTION RESPIRATORY (INHALATION) at 21:34

## 2021-11-03 RX ADMIN — NYSTATIN 500000 UNITS: 100000 SUSPENSION ORAL at 18:15

## 2021-11-03 RX ADMIN — HYDROCODONE BITARTRATE AND ACETAMINOPHEN 1 TABLET: 5; 325 TABLET ORAL at 20:23

## 2021-11-03 RX ADMIN — DIPHENHYDRAMINE HYDROCHLORIDE AND LIDOCAINE HYDROCHLORIDE AND ALUMINUM HYDROXIDE AND MAGNESIUM HYDRO 5 ML: KIT at 11:13

## 2021-11-03 RX ADMIN — NYSTATIN 500000 UNITS: 100000 SUSPENSION ORAL at 20:25

## 2021-11-03 RX ADMIN — HYDROCODONE BITARTRATE AND ACETAMINOPHEN 1 TABLET: 5; 325 TABLET ORAL at 16:38

## 2021-11-03 RX ADMIN — LIDOCAINE 1 PATCH: 50 PATCH CUTANEOUS at 08:56

## 2021-11-03 RX ADMIN — SODIUM CHLORIDE, PRESERVATIVE FREE 10 ML: 5 INJECTION INTRAVENOUS at 20:23

## 2021-11-03 NOTE — CASE MANAGEMENT/SOCIAL WORK
Continued Stay Note  [facility]     Patient Name: Claudia Price  MRN: [id number]  Today's Date: 11/3/2021    Admit Date: 10/25/2021     Discharge Plan     Row Name 11/03/21 0950       Plan    Plan Comments Attempted to reach patient via room phone with no answer. Spoke with Sophie regarding home O2. Orders in Epic. Patient's O2 sat 84% on RA at rest and desats quickly with exertion. Megan Valadez RN CCP               Discharge Codes    No documentation.               Expected Discharge Date and Time     Expected Discharge Date Expected Discharge Time    Nov 3, 2021             Megan Valadez RN

## 2021-11-03 NOTE — PROGRESS NOTES
"    Name: Claudia Price ADMIT: 10/25/2021   : 1975  PCP: Malika Magana CRNP    MRN: [id number] LOS: 9 days   AGE/SEX: 46 y.o. female  ROOM: [facility]     Subjective   Subjective   We were hopeful for discharge today, but she remains on 4 L at rest.  Pulmonology has advised not to go home yet.  We stopped gabapentin yesterday, but she wants to restart today.  Says her mouth feels \"gritty\".  Wants to try nystatin swish    Review of Systems  Denies chest pain, fevers, abdominal pain, diarrhea     Objective   Objective   Vital Signs  Temp:  [98.2 °F (36.8 °C)-99.3 °F (37.4 °C)] 98.7 °F (37.1 °C)  Heart Rate:  [] 87  Resp:  [18-20] 18  BP: (100-126)/(59-88) 100/59  SpO2:  [90 %-98 %] 91 %  on  Flow (L/min):  [3-5] 4;   Device (Oxygen Therapy): nasal cannula  Body mass index is 24.13 kg/m².  Physical Exam  Constitutional:       General: She is not in acute distress.     Appearance: She is not diaphoretic.   HENT:      Mouth/Throat:      Pharynx: No oropharyngeal exudate or posterior oropharyngeal erythema.      Comments: No thrush  Cardiovascular:      Rate and Rhythm: Normal rate and regular rhythm.      Pulses: Normal pulses.      Heart sounds: No murmur heard.  No gallop.    Pulmonary:      Effort: Pulmonary effort is normal. No respiratory distress.      Breath sounds: No wheezing or rhonchi.   Abdominal:      Palpations: Abdomen is soft.      Tenderness: There is no abdominal tenderness. There is no guarding.   Skin:     General: Skin is warm and dry.   Neurological:      Mental Status: She is alert.         Results Review     I reviewed the patient's new clinical results.  Results from last 7 days   Lab Units 21  0805 21  0606 10/30/21  0718 10/29/21  0622   WBC 10*3/mm3 16.51* 13.95* 10.24 13.34*   HEMOGLOBIN g/dL 14.3 13.3 11.9* 13.1   PLATELETS 10*3/mm3 359 421 320 322     Results from last 7 days   Lab Units 21  0805 21  0606 21  0459 10/31/21  0502 " 10/30/21  0718 10/30/21  0718   SODIUM mmol/L 138 139 137  --   --  140   POTASSIUM mmol/L 3.4* 4.2 4.2  --   --  4.5   CHLORIDE mmol/L 99 102 101  --   --  103   CO2 mmol/L 26.1 25.7 24.4  --   --  24.1   BUN mg/dL 14 17 16  --   --  11   CREATININE mg/dL 0.63 0.61 0.66 0.60   < > 0.48*   GLUCOSE mg/dL 66 95 109*  --   --  107*    < > = values in this interval not displayed.   Estimated Creatinine Clearance: 114.7 mL/min (by C-G formula based on SCr of 0.63 mg/dL).  Results from last 7 days   Lab Units 11/03/21  0805 11/02/21  0606 11/01/21  0459 10/31/21  0502   ALBUMIN g/dL 3.80 3.80 3.50 3.60   BILIRUBIN mg/dL 0.9 0.4 0.4 0.3   ALK PHOS U/L 85 85 73 68   AST (SGOT) U/L 31 38* 58* 62*   ALT (SGPT) U/L 63* 86* 92* 82*     Results from last 7 days   Lab Units 11/03/21  0805 11/02/21  0606 11/01/21  0459 10/31/21  0502 10/30/21  0718 10/30/21  0718   CALCIUM mg/dL 9.1 9.1 8.8  --   --  8.8   ALBUMIN g/dL 3.80 3.80 3.50 3.60   < > 3.40*   PHOSPHORUS mg/dL  --  4.2  --   --   --   --     < > = values in this interval not displayed.     Results from last 7 days   Lab Units 11/03/21  0805 11/01/21  0459 10/30/21  0718 10/28/21  0611   PROCALCITONIN ng/mL 0.07 0.04 0.05 0.04     SARS-CoV-2, CHEMA   Date Value Ref Range Status   10/19/2021 Detected (A) Not Detected Final     Comment:     Patients who have a positive COVID-19 test result may now have  treatment options. Treatment options are available for patients  with mild to moderate symptoms and for hospitalized patients.  Visit our website at [email or web address] for  resources and information.  This nucleic acid amplification test was developed and its performance  characteristics determined by [employer]. Nucleic acid  amplification tests include RT-PCR and TMA. This test has not been  FDA cleared or approved. This test has been authorized by FDA under  an Emergency Use Authorization (EUA). This test is only authorized  for the duration of time  the declaration that circumstances exist  justifying the authorization of the emergency use of in vitro  diagnostic tests for detection of SARS-CoV-2 virus and/or diagnosis  of COVID-19 infection under section 564(b)(1) of the Act, 21 U.S.C.  360bbb-3(b) (1), unless the authorization is terminated or revoked  sooner.  When diagnostic testing is negative, the possibility of a false  negative result should be considered in the context of a patient's  recent exposures and the presence of clinical signs and symptoms  consistent with COVID-19. An individual without symptoms of COVID-19  and who is not shedding SARS-CoV-2 virus would expect to have a  negative (not detected) result in this assay.   12/24/2020 Not Detected Not Detected Final     Comment:     This nucleic acid amplification test was developed and its performance  characteristics determined by [employer]. Nucleic acid  amplification tests include PCR and TMA. This test has not been FDA  cleared or approved. This test has been authorized by FDA under an  Emergency Use Authorization (EUA). This test is only authorized for  the duration of time the declaration that circumstances exist  justifying the authorization of the emergency use of in vitro  diagnostic tests for detection of SARS-CoV-2 virus and/or diagnosis  of COVID-19 infection under section 564(b)(1) of the Act, 21 U.S.C.  360bbb-3(b) (1), unless the authorization is terminated or revoked  sooner.  When diagnostic testing is negative, the possibility of a false  negative result should be considered in the context of a patient's  recent exposures and the presence of clinical signs and symptoms  consistent with COVID-19. An individual without symptoms of COVID-19  and who is not shedding SARS-CoV-2 virus would expect to have a  negative (not detected) result in this assay.     No results found for: HGBA1C, POCGLU    XR Chest 1 View  Narrative: SINGLE VIEW OF THE CHEST     HISTORY: COVID      COMPARISON: 10/25/2021     FINDINGS:  Bilateral infiltrates are unchanged when compared to prior exam. Heart  size is within normal limits. No pneumothorax or pleural effusion is  seen.     Impression: No significant interval change.     This report was finalized on 10/31/2021 5:48 AM by Dr. Jaida Proctor M.D.       I reviewed the patient's daily medications.  Scheduled Medications  arformoterol, 15 mcg, Nebulization, BID - RT  cetirizine, 10 mg, Oral, Daily  dexamethasone, 6 mg, Oral, Daily With Breakfast  enoxaparin, 40 mg, Subcutaneous, Nightly  fluticasone, 1 spray, Nasal, Daily  gabapentin, 200 mg, Oral, Nightly  lidocaine, 1 patch, Transdermal, Q24H  nystatin, 5 mL, Oral, 4x Daily  pantoprazole, 40 mg, Oral, Q AM  polyethylene glycol, 17 g, Oral, Daily  senna-docusate sodium, 2 tablet, Oral, BID  sodium chloride, 2 spray, Each Nare, BID    Infusions   Diet  Diet Regular; Vegetarian; Lacto-Ovo: Allows Dairy Products & Eggs       Assessment/Plan     Active Hospital Problems    Diagnosis  POA   • **Pneumonia due to COVID-19 virus [U07.1, J12.82]  Yes   • Cytokine release syndrome, grade 2 [D89.832]  Yes   • Mucositis oral [K12.30]  No   • Elevated LFTs [R79.89]  Yes   • Acute respiratory failure with hypoxia (HCC) [J96.01]  Yes   • Steroid-induced hyperglycemia [R73.9, T38.0X5A]  No   • Factor 5 Leiden mutation, heterozygous (HCC) [D68.51]  Yes   • Raynaud phenomenon [I73.00]  Yes   • 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       46 y.o. female with history of chronic low back pain who was admitted with acute hypoxic respiratory failure due to COVID-19 pneumonia. She was not vaccinated. She has completed a 5-day course of remdesivir. CTA chest is negative for PE.    Covid 19 PNA with Acute Hypoxic Respiratory Failure:   -She has completed a 5-day course of remdesivir.  She is on twice daily dexamethasone. Her inflammatory markers are uptrending today, as is D-dimer.  She had negative CTA chest 5 days ago, will discuss the utility of lower extremity ultrasound with pulmonology.   -Negative pro-Guero, no antibiotics for now  -Continue supportive care. Wean oxygen as tolerated.      Chronic low back pain:   -Gabapentin 200 nightly started.  She has Norco as needed for pain.   -Continue lidocaine patch and Flexeril    Mucositis  -She wants to try nystatin swish and spit    Elevated LFTs  - mild and likely due to COVID.     Factor V Leiden: monitor  Asthma: No bronchospasm currently.  Bradycardia: No heart block on ekg. continue to monitor  Constipation: Resolved.  Continue bowel regimen.      · Lovenox 40 mg SC daily for DVT prophylaxis.  · Full code.  · Discussed with patient  · Anticipate discharge home 1 to 2 days      Vicki Owens DO  [facility]  11/03/21  14:17 EDT    Patient was placed in face mask on first look.  I wore protective equipment throughout this patient encounter including a face mask, gloves and protective eyewear.  Hand hygiene was performed before donning protective equipment and after removal when leaving the room.

## 2021-11-03 NOTE — PLAN OF CARE
"  Problem: Adult Inpatient Plan of Care  Goal: Plan of Care Review  Outcome: Ongoing, Not Progressing  Flowsheets (Taken 11/3/2021 1552)  Progress: no change  Plan of Care Reviewed With: patient  Outcome Summary: pt. is A&Ox4 with VSS on monitor and on 4L NC. Pt. seen by pulm. today and decision was made to keep patient a little longer. pt. became bery anxious after the visit by pulm. pt. has c/o constant pain this shift, with PRN medications provding some relief. pt. IS done independently. pt. states that she \"doesn't feel as good as she did yesterday\". Will continue to monitor.  Goal: Patient-Specific Goal (Individualized)  Outcome: Ongoing, Not Progressing  Goal: Absence of Hospital-Acquired Illness or Injury  Outcome: Ongoing, Not Progressing  Intervention: Identify and Manage Fall Risk  Recent Flowsheet Documentation  Taken 11/3/2021 1400 by Nicko Mahmood, RN  Safety Promotion/Fall Prevention:   assistive device/personal items within reach   clutter free environment maintained   fall prevention program maintained   lighting adjusted   mobility aid in reach   nonskid shoes/slippers when out of bed   room organization consistent   safety round/check completed  Taken 11/3/2021 1200 by Nicko Mahmood, RN  Safety Promotion/Fall Prevention:   assistive device/personal items within reach   clutter free environment maintained   fall prevention program maintained   mobility aid in reach   lighting adjusted   nonskid shoes/slippers when out of bed   room organization consistent   safety round/check completed  Taken 11/3/2021 1000 by Nicko Mahmood, RN  Safety Promotion/Fall Prevention:   assistive device/personal items within reach   clutter free environment maintained   fall prevention program maintained   lighting adjusted   mobility aid in reach   nonskid shoes/slippers when out of bed   room organization consistent   safety round/check completed  Taken 11/3/2021 0800 by Nicko Mahmood, RN  Safety Promotion/Fall " Prevention:   assistive device/personal items within reach   clutter free environment maintained   fall prevention program maintained   lighting adjusted   mobility aid in reach   nonskid shoes/slippers when out of bed   room organization consistent   safety round/check completed  Intervention: Prevent Skin Injury  Recent Flowsheet Documentation  Taken 11/3/2021 0800 by Nicko Mahmood RN  Body Position: position changed independently  Intervention: Prevent Infection  Recent Flowsheet Documentation  Taken 11/3/2021 1400 by Nicko Mahmood RN  Infection Prevention:   environmental surveillance performed   hand hygiene promoted   personal protective equipment utilized   rest/sleep promoted   single patient room provided   visitors restricted/screened  Taken 11/3/2021 1200 by Nicko Mahmood RN  Infection Prevention:   environmental surveillance performed   hand hygiene promoted   personal protective equipment utilized   rest/sleep promoted   single patient room provided   visitors restricted/screened  Taken 11/3/2021 1000 by Nicko Mahmood RN  Infection Prevention:   environmental surveillance performed   hand hygiene promoted   personal protective equipment utilized   rest/sleep promoted   single patient room provided   visitors restricted/screened  Taken 11/3/2021 0800 by Nicko Mahmood RN  Infection Prevention:   environmental surveillance performed   hand hygiene promoted   personal protective equipment utilized   rest/sleep promoted   single patient room provided   visitors restricted/screened  Goal: Optimal Comfort and Wellbeing  Outcome: Ongoing, Not Progressing  Goal: Readiness for Transition of Care  Outcome: Ongoing, Not Progressing     Problem: Pain Chronic (Persistent)  Goal: Acceptable Pain Control and Functional Ability  Outcome: Ongoing, Not Progressing  Intervention: Develop Pain Management Plan  Recent Flowsheet Documentation  Taken 11/3/2021 1200 by Nicko Mahmood RN  Pain Management Interventions: see  "MAR  Taken 11/3/2021 1113 by Nicko Mahmood, RN  Pain Management Interventions: see MAR  Taken 11/3/2021 1000 by Nicko Mahmood RN  Pain Management Interventions:   see MAR   quiet environment facilitated  Taken 11/3/2021 0800 by Nicko Mahmood RN  Pain Management Interventions:   see MAR   quiet environment facilitated  Intervention: Manage Persistent Pain  Recent Flowsheet Documentation  Taken 11/3/2021 1400 by Nicko Mahmood RN  Medication Review/Management: medications reviewed  Taken 11/3/2021 1200 by Nicko Mahmood RN  Medication Review/Management: medications reviewed  Taken 11/3/2021 1000 by Nicko Mahmood RN  Medication Review/Management: medications reviewed  Taken 11/3/2021 0800 by Nicko Mahmood RN  Medication Review/Management: medications reviewed     Problem: Breathing Pattern Ineffective  Goal: Effective Breathing Pattern  Outcome: Ongoing, Not Progressing   Goal Outcome Evaluation:  Plan of Care Reviewed With: patient        Progress: no change  Outcome Summary: pt. is A&Ox4 with VSS on monitor and on 4L NC. Pt. seen by pulm. today and decision was made to keep patient a little longer. pt. became bery anxious after the visit by pulm. pt. has c/o constant pain this shift, with PRN medications provding some relief. pt. IS done independently. pt. states that she \"doesn't feel as good as she did yesterday\". Will continue to monitor.  "

## 2021-11-03 NOTE — PLAN OF CARE
Goal Outcome Evaluation:  Plan of Care Reviewed With: patient        Progress: improving  Outcome Summary: Pt A&Ox4, anxiety increased-issues keeping o2 levels above 90 if anxious-seeks out staff for support, up adlib, Reg vegetarian diet, desats with exertion, reports back pain- prn flexeril and pain meds given per orders, IS @ bedside, pt wants to go home tomorrow but feels defeated that she is still using oxygen and anxious, support given and discussed techniques to control/ lessen anxiety

## 2021-11-03 NOTE — PROGRESS NOTES
"Caesar Guillen MD                          [phone]      Patient ID:    Name:  Claudia Price    MRN:  [id number]    1975   46 y.o.  female            Patient Care Team:  Malika Magana CRNP as PCP - General (Family Medicine)    CC/ Reason for visit: Acute respiratory failure, acute COVID-19 pneumonia, acute back pain with sciatica    Subjective: Pt seen and examined this AM.  Had a bad night and states that she could not sleep well.  Very anxious and \"having a panic attack\" during my evaluation.  Sats in the high 80s while on 4 L nasal cannula.  Some conversational dyspnea as well.  Very eager to leave the hospital and disappointed when I asked her to not rash.  Convinced her that goal has to be to go home and not return.  She understands and appreciates.    ROS: Denies any subjective fevers, syncope or presyncopal events, new neurological deficits, nausea or vomiting currently    Objective     Vital Signs past 24hrs    BP range: BP: (100-126)/(59-88) 100/59  Pulse range: Heart Rate:  [] 87  Resp rate range: Resp:  [18-20] 18  Temp range: Temp (24hrs), Av.7 °F (37.1 °C), Min:98.2 °F (36.8 °C), Max:99.3 °F (37.4 °C)      Ventilator/Non-Invasive Ventilation Settings (From admission, onward)            None          Device (Oxygen Therapy): nasal cannula; humidified       65.8 kg (145 lb); Body mass index is 24.13 kg/m².      Intake/Output Summary (Last 24 hours) at 11/3/2021 1623  Last data filed at 11/3/2021 1324  Gross per 24 hour   Intake 360 ml   Output 1500 ml   Net -1140 ml       PHYSICAL EXAM   Constitutional: Middle aged pt in bed, No acute respiratory distress, + accessory muscle use  Head: - NCAT  Eyes: No pallor.  Anicteric sclerae, EOMI.  ENMT:  Mallampati 3, no oral thrush. Moist MM.   NECK: Trachea midline, No thyromegaly, no palpable cervical lymphadenopathy  Heart: RRR, no murmur. No pedal edema   Lungs: SPIKE +, No wheezes.  Occasional " crackles heard    Abdomen: Soft. No tenderness, guarding or rigidity. No palpable masses  Extremities: Extremities warm and well perfused. No cyanosis/ clubbing  Neuro: Conscious, answers appropriately, no gross focal neuro deficits  Psych: Mood and affect -anxious/tearful    PPE recommended per [facility] infectious disease Isolation protocol for the current clinical scenario(as mentioned below) was followed.     Scheduled meds:  arformoterol, 15 mcg, Nebulization, BID - RT  cetirizine, 10 mg, Oral, Daily  dexamethasone, 6 mg, Oral, Q12H  enoxaparin, 40 mg, Subcutaneous, Nightly  fluticasone, 1 spray, Nasal, Daily  gabapentin, 200 mg, Oral, Nightly  lidocaine, 1 patch, Transdermal, Q24H  nystatin, 5 mL, Oral, 4x Daily  pantoprazole, 40 mg, Oral, Q AM  polyethylene glycol, 17 g, Oral, Daily  senna-docusate sodium, 2 tablet, Oral, BID  sodium chloride, 2 spray, Each Nare, BID        IV meds:                           Data Review:      Results from last 7 days   Lab Units 11/03/21  0805 11/02/21  0606 11/01/21  0459 10/31/21  0502 10/30/21  0718 10/29/21  0622 10/29/21  0622   SODIUM mmol/L 138 139 137  --  140   < > 140   POTASSIUM mmol/L 3.4* 4.2 4.2  --  4.5   < > 4.5   CHLORIDE mmol/L 99 102 101  --  103   < > 104   CO2 mmol/L 26.1 25.7 24.4  --  24.1   < > 24.8   BUN mg/dL 14 17 16  --  11   < > 13   CREATININE mg/dL 0.63 0.61 0.66   < > 0.48*   < > 0.53*   CALCIUM mg/dL 9.1 9.1 8.8  --  8.8   < > 9.1   BILIRUBIN mg/dL 0.9 0.4 0.4   < > 0.2   < > 0.2   ALK PHOS U/L 85 85 73   < > 65   < > 75   ALT (SGPT) U/L 63* 86* 92*   < > 59*   < > 57*   AST (SGOT) U/L 31 38* 58*   < > 52*   < > 49*   GLUCOSE mg/dL 66 95 109*  --  107*   < > 128*   WBC 10*3/mm3 16.51* 13.95*  --   --  10.24   < > 13.34*   HEMOGLOBIN g/dL 14.3 13.3  --   --  11.9*   < > 13.1   PLATELETS 10*3/mm3 359 421  --   --  320   < > 322   PROBNP pg/mL  --   --   --   --   --   --  576.6*   PROCALCITONIN ng/mL 0.07  --  0.04  --  0.05  --   --      < > = values in this interval not displayed.       Lab Results   Component Value Date    CALCIUM 9.1 11/03/2021    PHOS 4.2 11/02/2021           COVID LABS:  Results From Last 14 Days   Lab Units 11/03/21  0805 11/02/21  0606 11/01/21  0459 11/01/21  0458 10/31/21  0502 10/30/21  0836 10/30/21  0718 10/29/21  0622 10/29/21  0622 10/28/21  0611 10/28/21  0611 10/25/21  1347 10/23/21  1744   PROBNP pg/mL  --   --   --   --   --   --   --   --  576.6*  --   --   --  60.7   CRP mg/dL  --   --   --   --   --   --  <0.30  --  0.40  --  0.51*   < >  --    D DIMER QUANT MCGFEU/mL 0.63*  --   --  0.40  --  0.38  --   --   --    < > <0.27   < > 0.38   FERRITIN ng/mL 745.00* 570.00* 591.00*  --    < >  --  639.00*   < > 782.00*   < > 592.00*   < >  --    PROCALCITONIN ng/mL 0.07  --  0.04  --   --   --  0.05  --   --    < > 0.04   < >  --    TROPONIN T ng/mL  --   --   --   --   --   --   --   --   --   --   --   --  <0.010    < > = values in this interval not displayed.              Results Review:    I have reviewed the relevant laboratory results and independently reviewed the chest imaging from this hospitalization including the available echocardiogram reports personally and summarized it if/ when appropriate below    Assessment    Acute hypoxic respiratory failure; severe  Acute COVID-19 pneumonia- Unvaccinated patient  Elevated inflammatory markers  Mild hepatitis  Sciatica     RECOMMENDATIONS:  Patient is still with 3 to 4 L nasal cannula and very anxious and does not feel as good as yesterday.  Sats in the high 80s with any conversation.  Inflammatory markers are mildly up today. CT angiogram ruled out pulmonary embolism.   Continue with steroids but will decrease dose today to see if it will help with anxiety and panic attacks.  Continue with bronchodilators.   Back and anxiety issues playing a role in recovery  Continue trend inflammatory markers  OOB as tolerated     DVT prophylaxis     Discussed with the nurse.  Will reassess in AM. Hopefully looks better to go.  She will need to follow-up with primary care in a week or 2 to evaluate for need of supplemental oxygen.    Caesar Guillen MD  11/3/2021

## 2021-11-04 ENCOUNTER — APPOINTMENT (OUTPATIENT)
Dept: GENERAL RADIOLOGY | Facility: HOSPITAL | Age: 46
End: 2021-11-04

## 2021-11-04 LAB
ALBUMIN SERPL-MCNC: 3.3 G/DL (ref 3.5–5.2)
ALBUMIN/GLOB SERPL: 1 G/DL
ALP SERPL-CCNC: 76 U/L (ref 39–117)
ALT SERPL W P-5'-P-CCNC: 54 U/L (ref 1–33)
ANION GAP SERPL CALCULATED.3IONS-SCNC: 9.7 MMOL/L (ref 5–15)
AST SERPL-CCNC: 33 U/L (ref 1–32)
BASOPHILS # BLD AUTO: 0.03 10*3/MM3 (ref 0–0.2)
BASOPHILS NFR BLD AUTO: 0.2 % (ref 0–1.5)
BILIRUB SERPL-MCNC: 0.3 MG/DL (ref 0–1.2)
BUN SERPL-MCNC: 12 MG/DL (ref 6–20)
BUN/CREAT SERPL: 23.5 (ref 7–25)
CALCIUM SPEC-SCNC: 8.9 MG/DL (ref 8.6–10.5)
CHLORIDE SERPL-SCNC: 104 MMOL/L (ref 98–107)
CO2 SERPL-SCNC: 24.3 MMOL/L (ref 22–29)
CREAT SERPL-MCNC: 0.51 MG/DL (ref 0.57–1)
DEPRECATED RDW RBC AUTO: 36.8 FL (ref 37–54)
EOSINOPHIL # BLD AUTO: 0.06 10*3/MM3 (ref 0–0.4)
EOSINOPHIL NFR BLD AUTO: 0.4 % (ref 0.3–6.2)
ERYTHROCYTE [DISTWIDTH] IN BLOOD BY AUTOMATED COUNT: 12.8 % (ref 12.3–15.4)
FERRITIN SERPL-MCNC: 772 NG/ML (ref 13–150)
GFR SERPL CREATININE-BSD FRML MDRD: 129 ML/MIN/1.73
GLOBULIN UR ELPH-MCNC: 3.2 GM/DL
GLUCOSE SERPL-MCNC: 83 MG/DL (ref 65–99)
HCT VFR BLD AUTO: 34.6 % (ref 34–46.6)
HGB BLD-MCNC: 11.7 G/DL (ref 12–15.9)
IMM GRANULOCYTES # BLD AUTO: 0.24 10*3/MM3 (ref 0–0.05)
IMM GRANULOCYTES NFR BLD AUTO: 1.8 % (ref 0–0.5)
LYMPHOCYTES # BLD AUTO: 0.74 10*3/MM3 (ref 0.7–3.1)
LYMPHOCYTES NFR BLD AUTO: 5.4 % (ref 19.6–45.3)
MCH RBC QN AUTO: 27.5 PG (ref 26.6–33)
MCHC RBC AUTO-ENTMCNC: 33.8 G/DL (ref 31.5–35.7)
MCV RBC AUTO: 81.2 FL (ref 79–97)
MONOCYTES # BLD AUTO: 0.7 10*3/MM3 (ref 0.1–0.9)
MONOCYTES NFR BLD AUTO: 5.1 % (ref 5–12)
NEUTROPHILS NFR BLD AUTO: 11.9 10*3/MM3 (ref 1.7–7)
NEUTROPHILS NFR BLD AUTO: 87.1 % (ref 42.7–76)
NRBC BLD AUTO-RTO: 0 /100 WBC (ref 0–0.2)
NT-PROBNP SERPL-MCNC: 66.2 PG/ML (ref 0–450)
PLATELET # BLD AUTO: 324 10*3/MM3 (ref 140–450)
PMV BLD AUTO: 9.8 FL (ref 6–12)
POTASSIUM SERPL-SCNC: 4.3 MMOL/L (ref 3.5–5.2)
POTASSIUM SERPL-SCNC: 4.3 MMOL/L (ref 3.5–5.2)
PROT SERPL-MCNC: 6.5 G/DL (ref 6–8.5)
RBC # BLD AUTO: 4.26 10*6/MM3 (ref 3.77–5.28)
SODIUM SERPL-SCNC: 138 MMOL/L (ref 136–145)
WBC # BLD AUTO: 13.67 10*3/MM3 (ref 3.4–10.8)

## 2021-11-04 PROCEDURE — 85025 COMPLETE CBC W/AUTO DIFF WBC: CPT | Performed by: STUDENT IN AN ORGANIZED HEALTH CARE EDUCATION/TRAINING PROGRAM

## 2021-11-04 PROCEDURE — 63710000001 DEXAMETHASONE PER 0.25 MG: Performed by: INTERNAL MEDICINE

## 2021-11-04 PROCEDURE — 80053 COMPREHEN METABOLIC PANEL: CPT | Performed by: STUDENT IN AN ORGANIZED HEALTH CARE EDUCATION/TRAINING PROGRAM

## 2021-11-04 PROCEDURE — 84132 ASSAY OF SERUM POTASSIUM: CPT | Performed by: HOSPITALIST

## 2021-11-04 PROCEDURE — 25010000002 FUROSEMIDE PER 20 MG: Performed by: INTERNAL MEDICINE

## 2021-11-04 PROCEDURE — 94799 UNLISTED PULMONARY SVC/PX: CPT

## 2021-11-04 PROCEDURE — 71045 X-RAY EXAM CHEST 1 VIEW: CPT

## 2021-11-04 PROCEDURE — 25010000002 ENOXAPARIN PER 10 MG: Performed by: INTERNAL MEDICINE

## 2021-11-04 PROCEDURE — 83880 ASSAY OF NATRIURETIC PEPTIDE: CPT | Performed by: HOSPITALIST

## 2021-11-04 PROCEDURE — 82728 ASSAY OF FERRITIN: CPT | Performed by: INTERNAL MEDICINE

## 2021-11-04 RX ORDER — ALPRAZOLAM 0.25 MG/1
0.25 TABLET ORAL 3 TIMES DAILY PRN
Status: DISCONTINUED | OUTPATIENT
Start: 2021-11-04 | End: 2021-11-05

## 2021-11-04 RX ORDER — BUDESONIDE AND FORMOTEROL FUMARATE DIHYDRATE 160; 4.5 UG/1; UG/1
2 AEROSOL RESPIRATORY (INHALATION)
Status: DISCONTINUED | OUTPATIENT
Start: 2021-11-04 | End: 2021-11-08 | Stop reason: HOSPADM

## 2021-11-04 RX ORDER — FUROSEMIDE 10 MG/ML
40 INJECTION INTRAMUSCULAR; INTRAVENOUS ONCE
Status: COMPLETED | OUTPATIENT
Start: 2021-11-04 | End: 2021-11-04

## 2021-11-04 RX ADMIN — HYDROCODONE BITARTRATE AND ACETAMINOPHEN 1 TABLET: 5; 325 TABLET ORAL at 06:52

## 2021-11-04 RX ADMIN — LIDOCAINE 1 PATCH: 50 PATCH CUTANEOUS at 08:23

## 2021-11-04 RX ADMIN — SALINE NASAL SPRAY 2 SPRAY: 1.5 SOLUTION NASAL at 08:25

## 2021-11-04 RX ADMIN — HYDROCODONE BITARTRATE AND ACETAMINOPHEN 1 TABLET: 5; 325 TABLET ORAL at 20:25

## 2021-11-04 RX ADMIN — FLUTICASONE PROPIONATE 1 SPRAY: 50 SPRAY, METERED NASAL at 08:25

## 2021-11-04 RX ADMIN — FUROSEMIDE 40 MG: 10 INJECTION, SOLUTION INTRAMUSCULAR; INTRAVENOUS at 16:29

## 2021-11-04 RX ADMIN — POLYETHYLENE GLYCOL 3350 17 G: 17 POWDER, FOR SOLUTION ORAL at 08:23

## 2021-11-04 RX ADMIN — NYSTATIN 500000 UNITS: 100000 SUSPENSION ORAL at 20:25

## 2021-11-04 RX ADMIN — DOCUSATE SODIUM 50MG AND SENNOSIDES 8.6MG 2 TABLET: 8.6; 5 TABLET, FILM COATED ORAL at 20:25

## 2021-11-04 RX ADMIN — PANTOPRAZOLE SODIUM 40 MG: 40 TABLET, DELAYED RELEASE ORAL at 06:47

## 2021-11-04 RX ADMIN — DOCUSATE SODIUM 50MG AND SENNOSIDES 8.6MG 2 TABLET: 8.6; 5 TABLET, FILM COATED ORAL at 08:22

## 2021-11-04 RX ADMIN — CETIRIZINE HYDROCHLORIDE 10 MG: 10 TABLET ORAL at 08:22

## 2021-11-04 RX ADMIN — HYDROCODONE BITARTRATE AND ACETAMINOPHEN 1 TABLET: 5; 325 TABLET ORAL at 16:29

## 2021-11-04 RX ADMIN — Medication 5 MG: at 22:33

## 2021-11-04 RX ADMIN — CYCLOBENZAPRINE 10 MG: 10 TABLET, FILM COATED ORAL at 08:23

## 2021-11-04 RX ADMIN — ENOXAPARIN SODIUM 40 MG: 40 INJECTION SUBCUTANEOUS at 20:25

## 2021-11-04 RX ADMIN — DEXAMETHASONE 6 MG: 4 TABLET ORAL at 08:22

## 2021-11-04 RX ADMIN — ARFORMOTEROL TARTRATE 15 MCG: 15 SOLUTION RESPIRATORY (INHALATION) at 07:47

## 2021-11-04 RX ADMIN — NYSTATIN 500000 UNITS: 100000 SUSPENSION ORAL at 16:30

## 2021-11-04 RX ADMIN — ARFORMOTEROL TARTRATE 15 MCG: 15 SOLUTION RESPIRATORY (INHALATION) at 20:16

## 2021-11-04 RX ADMIN — NYSTATIN 500000 UNITS: 100000 SUSPENSION ORAL at 11:40

## 2021-11-04 RX ADMIN — GABAPENTIN 200 MG: 100 CAPSULE ORAL at 20:25

## 2021-11-04 RX ADMIN — NYSTATIN 500000 UNITS: 100000 SUSPENSION ORAL at 08:23

## 2021-11-04 RX ADMIN — HYDROCODONE BITARTRATE AND ACETAMINOPHEN 1 TABLET: 5; 325 TABLET ORAL at 11:40

## 2021-11-04 RX ADMIN — SALINE NASAL SPRAY 2 SPRAY: 1.5 SOLUTION NASAL at 20:26

## 2021-11-04 RX ADMIN — HYDROCODONE BITARTRATE AND ACETAMINOPHEN 1 TABLET: 5; 325 TABLET ORAL at 01:24

## 2021-11-04 RX ADMIN — CYCLOBENZAPRINE 10 MG: 10 TABLET, FILM COATED ORAL at 16:29

## 2021-11-04 NOTE — NURSING NOTE
Md in to see patient today.   New order for prn xanax for anxiety and cxr.  Spoke with patient,  She declined xanax at this time,  But stated understanding that it is available to her.   Pt stated that her brother was bringing her a smoothie for her lunch.

## 2021-11-04 NOTE — PAYOR COMM NOTE
"Claudia Jenkins (46 y.o. Female)     PLEASE SEE ATTACHED FOR CONTINUED STAY AUTH     REF#[id number]    PLEASE CALL   OR  [phone]    THANK YOU    SHREYAS ROCK            Date of Birth Social Security Number Address Home Phone MRN    1975  [address] [place] [id number] [phone] [id number]    Yarsanism Marital Status             None        Admission Date Admission Type Admitting Provider Attending Provider Department, Room/Bed    10/25/21 Emergency Juvenal Fung MD Richards, Stephen J, MD [address], [address]/1    Discharge Date Discharge Disposition Discharge Destination                         Attending Provider: Montrell Rose MD    Allergies: Pseudoephedrine, Nedocromil    Isolation: Enh Drop/Con   Infection: COVID (confirmed) (10/20/21)   Code Status: CPR   Advance Care Planning Activity    Ht: 165.1 cm (65\")   Wt: 65.8 kg (145 lb)    Admission Cmt: None   Principal Problem: Pneumonia due to COVID-19 virus [U07.1,J12.82]                 Active Insurance as of 10/25/2021     Primary Coverage     Payor Plan Insurance Group Employer/Plan Group    Critical access hospital [employer] Critical access hospital [facility] PPO [id number]     Payor Plan Address Payor Plan Phone Number Payor Plan Fax Number Effective Dates    [address] [phone]  6/1/2014 - None Entered    [place] [address]       Subscriber Name Subscriber Birth Date Member ID       DONI JENKINS 3/27/1973 [id number]                 Emergency Contacts      (Rel.) Home Phone Work Phone Mobile Phone    Doni Jenkins (Spouse) -- -- [phone]    Joao Aguilar (Brother) -- -- [phone]              Oxygen Therapy (last 3 days)     Date/Time SpO2 Device (Oxygen Therapy) Flow (L/min) Oxygen Concentration (%) ETCO2 (mmHg)    11/04/21 0806 90 nasal cannula 4 -- --    11/04/21 0748 -- nasal cannula 4 -- --    11/04/21 0021 -- humidified; nasal cannula 4 -- --    11/03/21 2258 94 " humidified; nasal cannula 4 -- --    11/03/21 2134 94 humidified; nasal cannula 4 -- --    11/03/21 2012 92 humidified; nasal cannula 4 -- --    11/03/21 1551 -- nasal cannula; humidified 4 -- --    11/03/21 1324 91 nasal cannula 4 -- --    11/03/21 0800 -- nasal cannula 4 -- --    11/03/21 0758 91 humidified; nasal cannula 3 -- --    11/03/21 0750 98 nasal cannula; humidified 3 -- --    11/02/21 2356 -- humidified; nasal cannula 5 -- --    11/02/21 2303 96 humidified; nasal cannula 4 -- --    11/02/21 2149 94 humidified; nasal cannula 4 -- --    11/02/21 2004 -- humidified; nasal cannula 4 -- --    11/02/21 1947 90 high-flow mask; humidified 4 -- --    11/02/21 1620 94 nasal cannula; humidified 4 -- --    11/02/21 1420 93 nasal cannula; humidified 4 -- --    11/02/21 1408 86 nasal cannula; humidified 3 -- --    11/02/21 1300 89 nasal cannula; humidified 3 -- --    11/02/21 0932 90 nasal cannula; humidified 3 -- --    11/02/21 0806 86  humidified; nasal cannula 3 -- --    11/02/21 0700 93 nasal cannula; humidified 4 -- --    11/02/21 0621 94 -- -- -- --    11/02/21 0300 95 -- -- -- --    11/02/21 0243 95 -- -- -- --    11/02/21 0100 95 -- -- -- --    11/02/21 0037 -- nasal cannula 4 -- --    11/01/21 2333 97 humidified; nasal cannula 4 -- --    11/01/21 2300 94 humidified; nasal cannula 4 -- --    11/01/21 2034 -- nasal cannula 4 -- --    11/01/21 1944 92 humidified; nasal cannula 5 -- --    11/01/21 1353 94 humidified; nasal cannula 5 -- --    11/01/21 1230 91 humidified; nasal cannula 5 -- --    11/01/21 0855 -- nasal cannula; humidified 5 -- --    11/01/21 0849 90 humidified; nasal cannula 5 -- --    11/01/21 0753 93 humidified; nasal cannula 5 -- --    11/01/21 0703 95 nasal cannula; humidified -- -- --    11/01/21 0656 -- -- 5 -- --    11/01/21 0056 -- humidified; nasal cannula 5 -- --        Lines, Drains & Airways     Active LDAs     Name Placement date Placement time Site Days    Peripheral IV 10/28/21 4610  Left Antecubital 10/28/21  1312  Antecubital  7          Inactive LDAs     Name Placement date Placement time Removal date Removal time Site Days    [REMOVED] Peripheral IV 10/25/21 1347 Right Hand 10/25/21  1347  10/27/21  2200  Hand  2    [REMOVED] Peripheral IV 10/27/21 0517 Anterior; Left; Proximal Forearm 10/27/21  0517  10/27/21  2200  Forearm  less than 1    [REMOVED] Peripheral IV 10/27/21 2348 Left; Posterior Hand 10/27/21  2348  10/28/21  2225  Hand  less than 1                CIWA (last 3 days)     None          Medication Administration Report for Claudia Price as of 11/04/21 1321   Legend:    Given Hold Not Given Due Canceled Entry Other Actions    Time Time (Time) Time  Time-Action       Discontinued     Completed     Future     MAR Hold     Linked           Medications 11/02/21 11/03/21 11/04/21    acetaminophen (TYLENOL) tablet 650 mg  Dose: 650 mg  Freq: Every 6 Hours PRN Route: PO  PRN Reason: Mild Pain   Start: 10/25/21 2015   Admin Instructions:   Do not exceed 4 grams of acetaminophen in a 24 hr period. Max dose of 2gm for AST/ALT greater than 120 units/L      If given for pain, use the following pain scale:   Mild Pain = Pain Score of 1-3, CPOT 1-2  Moderate Pain = Pain Score of 4-6, CPOT 3-4  Severe Pain = Pain Score of 7-10, CPOT 5-8          ALPRAZolam (XANAX) tablet 0.25 mg  Dose: 0.25 mg  Freq: 3 Times Daily PRN Route: PO  PRN Reason: Anxiety  Start: 11/04/21 1051   End: 11/11/21 1050   Admin Instructions:    Caution: Look alike/sound alike drug alert.   Avoid grapefruit juice          arformoterol (BROVANA) nebulizer solution 15 mcg  Dose: 15 mcg  Freq: 2 Times Daily - RT Route: NEBULIZATION  Start: 10/29/21 2130   Admin Instructions:   Keep refrigerated.       0806-Given     2149-Given            0750-Given     2134-Given            0747-Given     2130            benzonatate (TESSALON) capsule 200 mg  Dose: 200 mg  Freq: 3 Times Daily PRN Route: PO  PRN Reason: Cough  Start: 10/25/21 9286    Admin Instructions:   Swallow whole.  Do not crush, chew, or open capsule.          bisacodyl (DULCOLAX) suppository 10 mg  Dose: 10 mg  Freq: Daily PRN Route: RE  PRN Reason: Constipation  Start: 10/28/21 0814          cetirizine (zyrTEC) tablet 10 mg  Dose: 10 mg  Freq: Daily Route: PO  Start: 10/25/21 2100       0926-Given             0856-Given             0822-Given             cyclobenzaprine (FLEXERIL) tablet 10 mg  Dose: 10 mg  Freq: 3 Times Daily PRN Route: PO  PRN Reason: Muscle Spasms  Start: 10/26/21 0930       2208-Given             0856-Given     1638-Given     2220-Given           0823-Given             dexamethasone (DECADRON) tablet 6 mg  Dose: 6 mg  Freq: Daily With Breakfast Route: PO  Start: 11/04/21 0800   End: 11/09/21 0759   Admin Instructions:   Take with food.         0822-Given             enoxaparin (LOVENOX) syringe 40 mg  Dose: 40 mg  Freq: Nightly Route: SC  Indications of Use: PROPHYLAXIS OF VENOUS THROMBOEMBOLISM  Start: 10/25/21 2100   Admin Instructions:   Give subcutaneous in abdomen only. Do not massage site after injection.       2004-Given             2023-Given 2100             fluticasone (FLONASE) 50 MCG/ACT nasal spray 1 spray  Dose: 1 spray  Freq: Daily Route: NA  Start: 10/26/21 0900       0927-Given             (0859)-Not Given             0825-Given             gabapentin (NEURONTIN) capsule 200 mg  Dose: 200 mg  Freq: Nightly Route: PO  Start: 11/03/21 2100   Admin Instructions:           2023-Given 2100             HYDROcodone-acetaminophen (NORCO) 5-325 MG per tablet 1 tablet  Dose: 1 tablet  Freq: Every 4 Hours PRN Route: PO  PRN Reason: Moderate Pain   Start: 10/26/21 0945   Admin Instructions:   [DILSHAD]    Do not exceed 4 grams of acetaminophen in a 24 hr period. Max dose of 2gm for AST/ALT greater than 120 units/L        If given for pain, use the following pain scale:   Mild Pain = Pain Score of 1-3, CPOT 1-2  Moderate Pain = Pain Score of  4-6, CPOT 3-4  Severe Pain = Pain Score of 7-10, CPOT 5-8       2358-Given             0422-Given     1113-Given     1638-Given       2023-Given             0124-Given     0652-Given     1140-Given           lidocaine (LIDODERM) 5 % 1 patch  Dose: 1 patch  Freq: Every 24 Hours Scheduled Route: TD  Start: 10/30/21 1245   Admin Instructions:   Apply to skin on low back . Remove patch in 12 hours. Apply patch only once for up to 12 hours in 24 hour period.  If given for pain, use the following pain scale:  Mild Pain = Pain Score of 1-3, CPOT 1-2  Moderate Pain = Pain Score of 4-6, CPOT 3-4  Severe Pain = Pain Score of 7-10, CPOT 5-8       0926-Medication Applied [C]     2130-Medication Removed            0856-Medication Applied [C]     2026-Medication Removed            0823-Medication Applied     2023 (Medication Removed)            Magnesium Sulfate 2 gram Bolus, followed by 8 gram infusion (total Mg dose 10 grams)- Mg less than or equal to 1mg/dL  Dose: 2 g  Freq: As Needed Route: IV  PRN Comment: See Administration Instructions  Start: 11/03/21 1419   Admin Instructions:   Mg less than or equal to 1mg/dL. Give 2 gm over 30 minutes as bolus, then infuse 2 gm over 2 hours for 4 doses (8 grams) for total dose of 10 grams.  Recheck Mg levels in the AM.         Or  Magnesium Sulfate 2 gram / 50mL Infusion (GIVE X 3 BAGS TO EQUAL 6GM TOTAL DOSE) - Mg 1.1 - 1.5 mg/dl  Dose: 2 g  Freq: As Needed Route: IV  PRN Comment: See Administration Instructions  Start: 11/03/21 1419   Admin Instructions:   Mg 1.1 -1.5 mg/dL. Infuse 2 grams over 2 hours for 3 doses (for a total Mg dose of 6 grams).  Recheck Mg level in the AM.         Or  Magnesium Sulfate 4 gram infusion- Mg 1.6-1.9 mg/dL  Dose: 4 g  Freq: As Needed Route: IV  PRN Comment: See Administration Instructions  Start: 11/03/21 1419   Admin Instructions:   Mg 1.6-1.9 mg/dL. Recheck Mg level in the AM.          melatonin tablet 5 mg  Dose: 5 mg  Freq: Nightly PRN Route:  PO  PRN Reason: Sleep  Start: 10/30/21 1603       2208-Given             2220-Given              nitroglycerin (NITROSTAT) SL tablet 0.4 mg  Dose: 0.4 mg  Freq: Every 5 Minutes PRN Route: SL  PRN Reason: Chest Pain  PRN Comment: Only if SBP Greater Than 100  Start: 10/25/21 1936   Admin Instructions:   If Pain Unrelieved After 3 Doses Notify MD          nystatin (MYCOSTATIN) 100,000 unit/mL suspension 500,000 Units  Dose: 5 mL  Freq: 4 Times Daily Route: PO  Start: 11/03/21 1800   End: 11/10/21 1759   Admin Instructions:   Choose administration instruction: swish and spit. Shake well before use.        1815-Given     2025-Given            0823-Given     1140-Given     1800       2100             pantoprazole (PROTONIX) EC tablet 40 mg  Dose: 40 mg  Freq: Every Early Morning Route: PO  Start: 10/26/21 0600   End: 11/16/21 1253   Admin Instructions:   Swallow whole; do not crush, split, or chew.       0552-Given             0858-Given             0647-Given             polyethylene glycol (MIRALAX) packet 17 g  Dose: 17 g  Freq: Daily Route: PO  Start: 10/29/21 1500   Admin Instructions:   Use 4-8 ounces of water, tea, or juice for each 17 gram dose.       (0930)-Not Given             (0856)-Not Given             0823-Given             potassium & sodium phosphates (PHOS-NAK) 280-160-250 MG packet - for Phosphorus less than 1.25 mg/dL  Dose: 2 packet  Freq: Every 6 Hours PRN Route: PO  PRN Comment: Phosphorus less than 1.25 mg/dL  Start: 11/03/21 1419   Admin Instructions:   Phosphorus replacement:     If Phos    < 1.25 mg/dL   : Give Neutraphos 2 packets by mouth every 6 hours x 2 doses. Recheck Phosphorus level 6 hours after replacement complete.         Or  potassium & sodium phosphates (PHOS-NAK) 280-160-250 MG packet - for Phosphorus 1.25 - 2.5 mg/dL  Dose: 2 packet  Freq: Every 6 Hours PRN Route: PO  PRN Comment: Phosphorus 1.25 - 2.5 mg/dL  Start: 11/03/21 1419   Admin Instructions:   Phosphorus replacement:        If Phos 1.25 - 2.5  mg/dL: Give Neutraphos 2 packets by mouth every 6 hours x 1 dose. Recheck Phosphorus level 6 hours after replacement complete.          potassium chloride (K-DUR,KLOR-CON) ER tablet 40 mEq  Dose: 40 mEq  Freq: As Needed Route: PO  PRN Comment: Potassium Replacement.  See Admin Instructions  Start: 11/03/21 1418   Admin Instructions:   Potassium 3.1 or Less Give KCl 40 mEq q4h x3 Doses   Potassium 3.2 - 3.6 Give KCl 40 mEq q4h x2 Doses     Check Potassium 4 Hours After Last Dose Given   Check Magnesium if Potassium Level Remains Low After Replacement   DO NOT GIVE if CrCl is Less Than 30 mL/minute or Urine Output Less Than 30 mL/hr  Swallow whole; do not crush, split, or chew.        1639-Given     2221-Given             potassium chloride (KLOR-CON) packet 40 mEq  Dose: 40 mEq  Freq: As Needed Route: PO  PRN Comment: Potassium Replacement, See Admin Instructions  Start: 11/03/21 1418   Admin Instructions:   Potassium 3.1 or Less Give KCl 40 mEq q4h x3 Doses   Potassium 3.2 - 3.6 Give KCl 40 mEq q4h x2 Doses     Check Potassium 4 Hours After Last Dose Given   Check Magnesium if Potassium Level Remains Low After Replacement   DO NOT GIVE if CrCl is Less Than 30 mL/minute or Urine Output Less Than 30 mL/hr          sennosides-docusate (PERICOLACE) 8.6-50 MG per tablet 2 tablet  Dose: 2 tablet  Freq: 2 Times Daily Route: PO  Start: 10/29/21 2100       (0930)-Not Given     (2004)-Not Given            0856-Given     2023-Given            0822-Given     2100            simethicone (MYLICON) chewable tablet 80 mg  Dose: 80 mg  Freq: 4 Times Daily PRN Route: PO  PRN Reason: Flatulence  Start: 10/30/21 2050          sodium chloride 0.9 % flush 10 mL  Dose: 10 mL  Freq: As Needed Route: IV  PRN Reason: Line Care  Start: 10/25/21 1327       2004-Given             2023-Given              sodium chloride nasal spray 2 spray  Dose: 2 spray  Freq: 2 Times Daily Route: EACH NARE  Start: 10/30/21 1300    Admin Instructions:          0930-Given     2011-Given            0859-Given     2025-Given            0825-Given     2100           Completed Medications  Medications 11/02/21 11/03/21 11/04/21       dexamethasone (DECADRON) injection 6 mg  Dose: 6 mg  Freq: Once Route: IV  Start: 10/25/21 1349   End: 10/25/21 1354   Admin Instructions:   For IV administration.  May be pushed over a minimum of 1 minute.          furosemide (LASIX) injection 20 mg  Dose: 20 mg  Freq: Once Route: IV  Start: 10/30/21 1700   End: 10/30/21 1702          iopamidol (ISOVUE-370) 76 % injection 100 mL  Dose: 100 mL  Freq: Once in Imaging Route: IV  Start: 10/28/21 2345   End: 10/28/21 2248          remdesivir 200 mg in sodium chloride 0.9 % 290 mL IVPB (powder vial)  Dose: 200 mg  Freq: Every 24 Hours Route: IV  Start: 10/25/21 2245   End: 10/25/21 2358   Admin Instructions:   Ensure all of the drug is administered. Flush line with 30mL NS after administration.         Followed by  remdesivir 100 mg in sodium chloride 0.9 % 270 mL IVPB (powder vial)  Dose: 100 mg  Freq: Every 24 Hours Route: IV  Start: 10/26/21 2100   End: 10/29/21 2118   Admin Instructions:   Ensure all of the drug is administered. Flush line with 30mL NS after administration.         Discontinued Medications  Medications 11/02/21 11/03/21 11/04/21       cyclobenzaprine (FLEXERIL) tablet 10 mg  Dose: 10 mg  Freq: 3 Times Daily Route: PO  Start: 10/25/21 2245   End: 10/26/21 0930          dexamethasone (DECADRON) tablet 6 mg  Dose: 6 mg  Freq: 2 Times Daily Route: PO  Start: 10/28/21 1100   End: 11/02/21 1348   Admin Instructions:   Take with food.       0926-Given              Or  dexamethasone (DECADRON) injection 6 mg  Dose: 6 mg  Freq: 2 Times Daily Route: IV  Start: 10/28/21 1100   End: 11/01/21 1530   Admin Instructions:   For IV administration.  May be pushed over a minimum of 1 minute.       0926-Not Given:  See Alt               dexamethasone (DECADRON) injection  6 mg  Dose: 6 mg  Freq: 2 Times Daily Route: IV  Start: 10/26/21 2100   End: 10/28/21 1002   Admin Instructions:   For IV administration.  May be pushed over a minimum of 1 minute.          dexamethasone (DECADRON) injection 6 mg  Dose: 6 mg  Freq: Every 8 Hours Route: IV  Start: 10/25/21 2245   End: 10/26/21 0930   Admin Instructions:   For IV administration.  May be pushed over a minimum of 1 minute.          dexamethasone (DECADRON) tablet 6 mg  Dose: 6 mg  Freq: Every 12 Hours Scheduled Route: PO  Start: 11/03/21 2100   End: 11/03/21 1628   Admin Instructions:   Take with food.          dexamethasone (DECADRON) tablet 6 mg  Dose: 6 mg  Freq: Daily With Breakfast Route: PO  Start: 11/03/21 0800   End: 11/03/21 1422   Admin Instructions:   Take with food.        0854-Given              First Mouthwash (Magic Mouthwash) 5 mL  Dose: 5 mL  Freq: Every 6 Hours Route: SWISH & SPIT  Start: 11/01/21 1600   End: 11/03/21 1348   Admin Instructions:   Shake Well Before Each Use. Stable 180 days at room temp after mixing.       0552-Given     1434-Given     (1538)-Not Given       2209-Given             (0517)-Not Given [C]     1113-Given             gabapentin (NEURONTIN) capsule 100 mg  Dose: 100 mg  Freq: Nightly Route: PO  Start: 10/26/21 2100   End: 10/28/21 0955   Admin Instructions:             gabapentin (NEURONTIN) capsule 200 mg  Dose: 200 mg  Freq: Nightly Route: PO  Start: 10/28/21 2100   End: 11/02/21 1349   Admin Instructions:             HYDROcodone-acetaminophen (NORCO) 5-325 MG per tablet 1 tablet  Dose: 1 tablet  Freq: Every 6 Hours PRN Route: PO  PRN Reason: Moderate Pain   Start: 10/25/21 2015   End: 10/26/21 0940   Admin Instructions:   [DILSHAD]    Do not exceed 4 grams of acetaminophen in a 24 hr period. Max dose of 2gm for AST/ALT greater than 120 units/L        If given for pain, use the following pain scale:   Mild Pain = Pain Score of 1-3, CPOT 1-2  Moderate Pain = Pain Score of 4-6, CPOT 3-4  Severe  Pain = Pain Score of 7-10, CPOT 5-8          ipratropium-albuterol (DUO-NEB) nebulizer solution 3 mL  Dose: 3 mL  Freq: 4 Times Daily - RT Route: NEBULIZATION  Start: 10/28/21 2030   End: 10/29/21 171          morphine injection 2 mg  Dose: 2 mg  Freq: Every 4 Hours PRN Route: IV  PRN Reason: Severe Pain   Start: 10/26/21 0940   End: 21 0939   Admin Instructions:   If given for pain, use the following pain scale:  Mild Pain = Pain Score of 1-3, CPOT 1-2  Moderate Pain = Pain Score of 4-6, CPOT 3-4  Severe Pain = Pain Score of 7-10, CPOT 5-8          Pharmacy Consult - Remdesivir  Freq: Continuous PRN Route: XX  PRN Reason: Consult  Start: 10/25/21 2146   End: 10/26/21 0819          sennosides-docusate (PERICOLACE) 8.6-50 MG per tablet 2 tablet  Dose: 2 tablet  Freq: 2 Times Daily PRN Route: PO  PRN Reason: Constipation  Start: 10/28/21 0814   End: 10/29/21 1404                 Operative/Procedure Notes (last 72 hours)  Notes from 21 1322 through 21 1322   No notes of this type exist for this encounter.            Physician Progress Notes (last 72 hours)      Montrell Rose MD at 21 0550            Dedicated to Hospital Care    [phone]   LOS: 10 days     Name: Claudia Price  Age/Sex: 46 y.o. female  :  1975        PCP: Malika Magana CRNP  Chief Complaint   Patient presents with   • Shortness of Breath   • Hip Pain   • Leg Pain      Subjective   She is rather frustrated and clearly anxious and depressed.  She avoided eye contact for the majority of our encounter.  She seems very sullen.  She is frustrated that her oxygen levels are going down today.  She is worried that things are going well.  She does not feel much different other than she does feel anxious.  She denies any fevers or chills.  Denies any nausea vomiting.  She has had some constipation related to the opiates she has been taking for back pain.  General: No Fever or Chills, Cardiac: No Chest Pain or  Palpitations, GI: No Nausea, Vomiting, or Diarrhea and Other: No bleeding    arformoterol, 15 mcg, Nebulization, BID - RT  cetirizine, 10 mg, Oral, Daily  dexamethasone, 6 mg, Oral, Daily With Breakfast  enoxaparin, 40 mg, Subcutaneous, Nightly  fluticasone, 1 spray, Nasal, Daily  gabapentin, 200 mg, Oral, Nightly  lidocaine, 1 patch, Transdermal, Q24H  nystatin, 5 mL, Oral, 4x Daily  pantoprazole, 40 mg, Oral, Q AM  polyethylene glycol, 17 g, Oral, Daily  senna-docusate sodium, 2 tablet, Oral, BID  sodium chloride, 2 spray, Each Nare, BID           Objective   Vital Signs  Temp:  [97.8 °F (36.6 °C)-99.3 °F (37.4 °C)] 97.8 °F (36.6 °C)  Heart Rate:  [] 72  Resp:  [18] 18  BP: (100-113)/(59-71) 104/71  Body mass index is 24.13 kg/m².    Intake/Output Summary (Last 24 hours) at 11/4/2021 0550  Last data filed at 11/4/2021 0128  Gross per 24 hour   Intake --   Output 1700 ml   Net -1700 ml       Physical Exam  Vitals and nursing note reviewed.   Constitutional:       Appearance: She is ill-appearing.   HENT:      Head: Normocephalic and atraumatic.   Cardiovascular:      Rate and Rhythm: Normal rate and regular rhythm.   Pulmonary:      Effort: Pulmonary effort is normal. No respiratory distress.      Breath sounds: Rales present.      Comments: Noted crackles in the right lower lobe  Abdominal:      General: Bowel sounds are normal. There is no distension.      Palpations: Abdomen is soft.   Skin:     General: Skin is warm.      Coloration: Skin is pale.   Neurological:      General: No focal deficit present.      Mental Status: She is alert and oriented to person, place, and time.   Psychiatric:      Comments: She is clearly depressed and avoided eye contact.  She has been anxious at times.  Her thought process is somewhat fatalistic at this point.           Results Review:       I reviewed the patient's new clinical results.  Results from last 7 days   Lab Units 11/04/21  0551 11/03/21  0805 11/02/21  0606  10/30/21  0718 10/29/21  0622   WBC 10*3/mm3 13.67* 16.51* 13.95* 10.24 13.34*   HEMOGLOBIN g/dL 11.7* 14.3 13.3 11.9* 13.1   PLATELETS 10*3/mm3 324 359 421 320 322     Results from last 7 days   Lab Units 11/04/21  0551 11/03/21  0805 11/02/21  0606 11/01/21  0459 10/31/21  0502 10/30/21  0718 10/29/21  0622   SODIUM mmol/L 138 138 139 137  --  140 140   POTASSIUM mmol/L 4.3  4.3 3.4* 4.2 4.2  --  4.5 4.5   CHLORIDE mmol/L 104 99 102 101  --  103 104   CO2 mmol/L 24.3 26.1 25.7 24.4  --  24.1 24.8   BUN mg/dL 12 14 17 16  --  11 13   CREATININE mg/dL 0.51* 0.63 0.61 0.66 0.60 0.48* 0.53*   CALCIUM mg/dL 8.9 9.1 9.1 8.8  --  8.8 9.1   PHOSPHORUS mg/dL  --   --  4.2  --   --   --   --    Estimated Creatinine Clearance: 114.7 mL/min (by C-G formula based on SCr of 0.63 mg/dL).      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       PLAN  This is a 46-year-old female with history of mild uncomplicated asthma who presents to the hospital with shortness of breath and is found to have Covid pneumonia with hypoxic respiratory failure.    Covid 19 PNA with Acute Hypoxic Respiratory Failure:   -She has completed a 5-day course of remdesivir. She is on twice daily dexamethasone. Her inflammatory markers were increasing yesterday but are back down today.  She had negative CTA chest 5 days ago, given the worsening in her hypoxia I will  repeat her chest x-ray today.  We will also add a BNP to her morning labs.  Was done a few days ago was elevated.  She does have some crackles in the right lower lobe on exam today.  -Negative pro-Guero, no antibiotics for now  -Continue supportive care. Wean oxygen as tolerated.       Chronic low back pain:   -Gabapentin 200 nightly started.  She has Norco as needed for pain.   -Continue lidocaine patch and Flexeril     Mucositis  -She wants to try nystatin swish and spit     Elevated LFTs  - mild and likely due to COVID.     Depression and anxiety  -She is dealing with pretty significant situational depression and anxiety on.  I do believe the social isolation is probably starting to get to the patient some.  -Could consider initiation of an antidepressant here in the hospital.  Hold off for now as the patient was not very enthusiastic about medications.  I have added as needed Xanax to her medications given her anxiety.  Have informed the patient that it is ordered if needed but she is not sure if she is ready to take this or not.    Factor V Leiden: monitor  Asthma: No bronchospasm currently.  Bradycardia: No heart block on ekg. continue to monitor  Constipation: Resolved.  Continue bowel regimen.        · Lovenox 40 mg SC daily for DVT prophylaxis.  · Full code.  · Discussed with patient  · Anticipate discharge once her oxygen levels have stabilized, she still in quite a tenuous situation with fluctuating oxygen requirements and sitting right on the cusp of needing more than 4 L.          Disposition  To be determined      Montrell Rose MD  [facility]  11/04/21  05:50 EDT            Electronically signed by Montrell Rose MD at 11/04/21 4888     Caesar Guillen MD at 11/03/21 7850                                      Caesar Guillen MD                          [phone]      Patient ID:    Name:  Claudia Price    MRN:  [id number]    1975   46  "y.o.  female            Patient Care Team:  Malika Magana CRNP as PCP - General (Family Medicine)    CC/ Reason for visit: Acute respiratory failure, acute COVID-19 pneumonia, acute back pain with sciatica    Subjective: Pt seen and examined this AM.  Had a bad night and states that she could not sleep well.  Very anxious and \"having a panic attack\" during my evaluation.  Sats in the high 80s while on 4 L nasal cannula.  Some conversational dyspnea as well.  Very eager to leave the hospital and disappointed when I asked her to not rash.  Convinced her that goal has to be to go home and not return.  She understands and appreciates.    ROS: Denies any subjective fevers, syncope or presyncopal events, new neurological deficits, nausea or vomiting currently    Objective     Vital Signs past 24hrs    BP range: BP: (100-126)/(59-88) 100/59  Pulse range: Heart Rate:  [] 87  Resp rate range: Resp:  [18-20] 18  Temp range: Temp (24hrs), Av.7 °F (37.1 °C), Min:98.2 °F (36.8 °C), Max:99.3 °F (37.4 °C)      Ventilator/Non-Invasive Ventilation Settings (From admission, onward)            None          Device (Oxygen Therapy): nasal cannula; humidified       65.8 kg (145 lb); Body mass index is 24.13 kg/m².      Intake/Output Summary (Last 24 hours) at 11/3/2021 1623  Last data filed at 11/3/2021 1324  Gross per 24 hour   Intake 360 ml   Output 1500 ml   Net -1140 ml       PHYSICAL EXAM   Constitutional: Middle aged pt in bed, No acute respiratory distress, + accessory muscle use  Head: - NCAT  Eyes: No pallor.  Anicteric sclerae, EOMI.  ENMT:  Mallampati 3, no oral thrush. Moist MM.   NECK: Trachea midline, No thyromegaly, no palpable cervical lymphadenopathy  Heart: RRR, no murmur. No pedal edema   Lungs: SPIKE +, No wheezes.  Occasional crackles heard    Abdomen: Soft. No tenderness, guarding or rigidity. No palpable masses  Extremities: Extremities warm and well perfused. No cyanosis/ clubbing  Neuro: " Conscious, answers appropriately, no gross focal neuro deficits  Psych: Mood and affect -anxious/tearful    PPE recommended per [facility] infectious disease Isolation protocol for the current clinical scenario(as mentioned below) was followed.     Scheduled meds:  arformoterol, 15 mcg, Nebulization, BID - RT  cetirizine, 10 mg, Oral, Daily  dexamethasone, 6 mg, Oral, Q12H  enoxaparin, 40 mg, Subcutaneous, Nightly  fluticasone, 1 spray, Nasal, Daily  gabapentin, 200 mg, Oral, Nightly  lidocaine, 1 patch, Transdermal, Q24H  nystatin, 5 mL, Oral, 4x Daily  pantoprazole, 40 mg, Oral, Q AM  polyethylene glycol, 17 g, Oral, Daily  senna-docusate sodium, 2 tablet, Oral, BID  sodium chloride, 2 spray, Each Nare, BID        IV meds:                           Data Review:      Results from last 7 days   Lab Units 11/03/21  0805 11/02/21  0606 11/01/21  0459 10/31/21  0502 10/30/21  0718 10/29/21  0622 10/29/21  0622   SODIUM mmol/L 138 139 137  --  140   < > 140   POTASSIUM mmol/L 3.4* 4.2 4.2  --  4.5   < > 4.5   CHLORIDE mmol/L 99 102 101  --  103   < > 104   CO2 mmol/L 26.1 25.7 24.4  --  24.1   < > 24.8   BUN mg/dL 14 17 16  --  11   < > 13   CREATININE mg/dL 0.63 0.61 0.66   < > 0.48*   < > 0.53*   CALCIUM mg/dL 9.1 9.1 8.8  --  8.8   < > 9.1   BILIRUBIN mg/dL 0.9 0.4 0.4   < > 0.2   < > 0.2   ALK PHOS U/L 85 85 73   < > 65   < > 75   ALT (SGPT) U/L 63* 86* 92*   < > 59*   < > 57*   AST (SGOT) U/L 31 38* 58*   < > 52*   < > 49*   GLUCOSE mg/dL 66 95 109*  --  107*   < > 128*   WBC 10*3/mm3 16.51* 13.95*  --   --  10.24   < > 13.34*   HEMOGLOBIN g/dL 14.3 13.3  --   --  11.9*   < > 13.1   PLATELETS 10*3/mm3 359 421  --   --  320   < > 322   PROBNP pg/mL  --   --   --   --   --   --  576.6*   PROCALCITONIN ng/mL 0.07  --  0.04  --  0.05  --   --     < > = values in this interval not displayed.       Lab Results   Component Value Date    CALCIUM 9.1 11/03/2021    PHOS 4.2 11/02/2021           COVID LABS:  Results  From Last 14 Days   Lab Units 11/03/21  0805 11/02/21  0606 11/01/21  0459 11/01/21  0458 10/31/21  0502 10/30/21  0836 10/30/21  0718 10/29/21  0622 10/29/21  0622 10/28/21  0611 10/28/21  0611 10/25/21  1347 10/23/21  1744   PROBNP pg/mL  --   --   --   --   --   --   --   --  576.6*  --   --   --  60.7   CRP mg/dL  --   --   --   --   --   --  <0.30  --  0.40  --  0.51*   < >  --    D DIMER QUANT MCGFEU/mL 0.63*  --   --  0.40  --  0.38  --   --   --    < > <0.27   < > 0.38   FERRITIN ng/mL 745.00* 570.00* 591.00*  --    < >  --  639.00*   < > 782.00*   < > 592.00*   < >  --    PROCALCITONIN ng/mL 0.07  --  0.04  --   --   --  0.05  --   --    < > 0.04   < >  --    TROPONIN T ng/mL  --   --   --   --   --   --   --   --   --   --   --   --  <0.010    < > = values in this interval not displayed.              Results Review:    I have reviewed the relevant laboratory results and independently reviewed the chest imaging from this hospitalization including the available echocardiogram reports personally and summarized it if/ when appropriate below    Assessment    Acute hypoxic respiratory failure; severe  Acute COVID-19 pneumonia- Unvaccinated patient  Elevated inflammatory markers  Mild hepatitis  Sciatica     RECOMMENDATIONS:  Patient is still with 3 to 4 L nasal cannula and very anxious and does not feel as good as yesterday.  Sats in the high 80s with any conversation.  Inflammatory markers are mildly up today. CT angiogram ruled out pulmonary embolism.   Continue with steroids but will decrease dose today to see if it will help with anxiety and panic attacks.  Continue with bronchodilators.   Back and anxiety issues playing a role in recovery  Continue trend inflammatory markers  OOB as tolerated     DVT prophylaxis     Discussed with the nurse. Will reassess in AM. Hopefully looks better to go.  She will need to follow-up with primary care in a week or 2 to evaluate for need of supplemental oxygen.    Caesar  "Braeden Guillen MD  11/3/2021    Electronically signed by Caesar Guillen MD at 21 [id number]     Vicki Owens DO at 21 1410              Name: Claudia Price ADMIT: 10/25/2021   : 1975  PCP: Malika Magana CRNP    MRN: [id number] LOS: 9 days   AGE/SEX: 46 y.o. female  ROOM: [facility]     Subjective   Subjective   We were hopeful for discharge today, but she remains on 4 L at rest.  Pulmonology has advised not to go home yet.  We stopped gabapentin yesterday, but she wants to restart today.  Says her mouth feels \"gritty\".  Wants to try nystatin swish    Review of Systems  Denies chest pain, fevers, abdominal pain, diarrhea    Objective   Objective   Vital Signs  Temp:  [98.2 °F (36.8 °C)-99.3 °F (37.4 °C)] 98.7 °F (37.1 °C)  Heart Rate:  [] 87  Resp:  [18-20] 18  BP: (100-126)/(59-88) 100/59  SpO2:  [90 %-98 %] 91 %  on  Flow (L/min):  [3-5] 4;   Device (Oxygen Therapy): nasal cannula  Body mass index is 24.13 kg/m².  Physical Exam  Constitutional:       General: She is not in acute distress.     Appearance: She is not diaphoretic.   HENT:      Mouth/Throat:      Pharynx: No oropharyngeal exudate or posterior oropharyngeal erythema.      Comments: No thrush  Cardiovascular:      Rate and Rhythm: Normal rate and regular rhythm.      Pulses: Normal pulses.      Heart sounds: No murmur heard.  No gallop.    Pulmonary:      Effort: Pulmonary effort is normal. No respiratory distress.      Breath sounds: No wheezing or rhonchi.   Abdominal:      Palpations: Abdomen is soft.      Tenderness: There is no abdominal tenderness. There is no guarding.   Skin:     General: Skin is warm and dry.   Neurological:      Mental Status: She is alert.         Results Review     I reviewed the patient's new clinical results.  Results from last 7 days   Lab Units 21  0805 21  0606 10/30/21  0718 10/29/21  0622   WBC 10*3/mm3 16.51* 13.95* 10.24 13.34*   HEMOGLOBIN g/dL 14.3 13.3 11.9* " 13.1   PLATELETS 10*3/mm3 359 421 320 322     Results from last 7 days   Lab Units 11/03/21  0805 11/02/21  0606 11/01/21  0459 10/31/21  0502 10/30/21  0718 10/30/21  0718   SODIUM mmol/L 138 139 137  --   --  140   POTASSIUM mmol/L 3.4* 4.2 4.2  --   --  4.5   CHLORIDE mmol/L 99 102 101  --   --  103   CO2 mmol/L 26.1 25.7 24.4  --   --  24.1   BUN mg/dL 14 17 16  --   --  11   CREATININE mg/dL 0.63 0.61 0.66 0.60   < > 0.48*   GLUCOSE mg/dL 66 95 109*  --   --  107*    < > = values in this interval not displayed.   Estimated Creatinine Clearance: 114.7 mL/min (by C-G formula based on SCr of 0.63 mg/dL).  Results from last 7 days   Lab Units 11/03/21  0805 11/02/21  0606 11/01/21  0459 10/31/21  0502   ALBUMIN g/dL 3.80 3.80 3.50 3.60   BILIRUBIN mg/dL 0.9 0.4 0.4 0.3   ALK PHOS U/L 85 85 73 68   AST (SGOT) U/L 31 38* 58* 62*   ALT (SGPT) U/L 63* 86* 92* 82*     Results from last 7 days   Lab Units 11/03/21  0805 11/02/21  0606 11/01/21  0459 10/31/21  0502 10/30/21  0718 10/30/21  0718   CALCIUM mg/dL 9.1 9.1 8.8  --   --  8.8   ALBUMIN g/dL 3.80 3.80 3.50 3.60   < > 3.40*   PHOSPHORUS mg/dL  --  4.2  --   --   --   --     < > = values in this interval not displayed.     Results from last 7 days   Lab Units 11/03/21  0805 11/01/21  0459 10/30/21  0718 10/28/21  0611   PROCALCITONIN ng/mL 0.07 0.04 0.05 0.04     SARS-CoV-2, CHEMA   Date Value Ref Range Status   10/19/2021 Detected (A) Not Detected Final     Comment:     Patients who have a positive COVID-19 test result may now have  treatment options. Treatment options are available for patients  with mild to moderate symptoms and for hospitalized patients.  Visit our website at [email or web address] for  resources and information.  This nucleic acid amplification test was developed and its performance  characteristics determined by [email or web address]. Nucleic acid  amplification tests include RT-PCR and TMA. This test has not been  FDA cleared or  approved. This test has been authorized by FDA under  an Emergency Use Authorization (EUA). This test is only authorized  for the duration of time the declaration that circumstances exist  justifying the authorization of the emergency use of in vitro  diagnostic tests for detection of SARS-CoV-2 virus and/or diagnosis  of COVID-19 infection under section 564(b)(1) of the Act, 21 U.S.C.  360bbb-3(b) (1), unless the authorization is terminated or revoked  sooner.  When diagnostic testing is negative, the possibility of a false  negative result should be considered in the context of a patient's  recent exposures and the presence of clinical signs and symptoms  consistent with COVID-19. An individual without symptoms of COVID-19  and who is not shedding SARS-CoV-2 virus would expect to have a  negative (not detected) result in this assay.   12/24/2020 Not Detected Not Detected Final     Comment:     This nucleic acid amplification test was developed and its performance  characteristics determined by [employer]. Nucleic acid  amplification tests include PCR and TMA. This test has not been FDA  cleared or approved. This test has been authorized by FDA under an  Emergency Use Authorization (EUA). This test is only authorized for  the duration of time the declaration that circumstances exist  justifying the authorization of the emergency use of in vitro  diagnostic tests for detection of SARS-CoV-2 virus and/or diagnosis  of COVID-19 infection under section 564(b)(1) of the Act, 21 U.S.C.  360bbb-3(b) (1), unless the authorization is terminated or revoked  sooner.  When diagnostic testing is negative, the possibility of a false  negative result should be considered in the context of a patient's  recent exposures and the presence of clinical signs and symptoms  consistent with COVID-19. An individual without symptoms of COVID-19  and who is not shedding SARS-CoV-2 virus would expect to have a  negative (not  detected) result in this assay.     No results found for: HGBA1C, POCGLU    XR Chest 1 View  Narrative: SINGLE VIEW OF THE CHEST     HISTORY: COVID     COMPARISON: 10/25/2021     FINDINGS:  Bilateral infiltrates are unchanged when compared to prior exam. Heart  size is within normal limits. No pneumothorax or pleural effusion is  seen.     Impression: No significant interval change.     This report was finalized on 10/31/2021 5:48 AM by Dr. Jaida Proctor M.D.       I reviewed the patient's daily medications.  Scheduled Medications  arformoterol, 15 mcg, Nebulization, BID - RT  cetirizine, 10 mg, Oral, Daily  dexamethasone, 6 mg, Oral, Daily With Breakfast  enoxaparin, 40 mg, Subcutaneous, Nightly  fluticasone, 1 spray, Nasal, Daily  gabapentin, 200 mg, Oral, Nightly  lidocaine, 1 patch, Transdermal, Q24H  nystatin, 5 mL, Oral, 4x Daily  pantoprazole, 40 mg, Oral, Q AM  polyethylene glycol, 17 g, Oral, Daily  senna-docusate sodium, 2 tablet, Oral, BID  sodium chloride, 2 spray, Each Nare, BID    Infusions   Diet  Diet Regular; Vegetarian; Lacto-Ovo: Allows Dairy Products & Eggs      Assessment/Plan     Active Hospital Problems    Diagnosis  POA   • **Pneumonia due to COVID-19 virus [U07.1, J12.82]  Yes   • Cytokine release syndrome, grade 2 [D89.832]  Yes   • Mucositis oral [K12.30]  No   • Elevated LFTs [R79.89]  Yes   • Acute respiratory failure with hypoxia (HCC) [J96.01]  Yes   • Steroid-induced hyperglycemia [R73.9, T38.0X5A]  No   • Factor 5 Leiden mutation, heterozygous (HCC) [D68.51]  Yes   • Raynaud phenomenon [I73.00]  Yes   • 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       46 y.o. female with history of chronic low back pain who was admitted with acute hypoxic respiratory failure due to COVID-19 pneumonia. She was not vaccinated. She has completed a 5-day course of  remdesivir. CTA chest is negative for PE.    Covid 19 PNA with Acute Hypoxic Respiratory Failure:   -She has completed a 5-day course of remdesivir. She is on twice daily dexamethasone. Her inflammatory markers are uptrending today, as is D-dimer.  She had negative CTA chest 5 days ago, will discuss the utility of lower extremity ultrasound with pulmonology.   -Negative pro-Guero, no antibiotics for now  -Continue supportive care. Wean oxygen as tolerated.      Chronic low back pain:   -Gabapentin 200 nightly started.  She has Norco as needed for pain.   -Continue lidocaine patch and Flexeril    Mucositis  -She wants to try nystatin swish and spit    Elevated LFTs  - mild and likely due to COVID.     Factor V Leiden: monitor  Asthma: No bronchospasm currently.  Bradycardia: No heart block on ekg. continue to monitor  Constipation: Resolved.  Continue bowel regimen.      · Lovenox 40 mg SC daily for DVT prophylaxis.  · Full code.  · Discussed with patient  · Anticipate discharge home 1 to 2 days      Vicki Owens DO  [facility]  21  14:17 EDT    Patient was placed in face mask on first look.  I wore protective equipment throughout this patient encounter including a face mask, gloves and protective eyewear.  Hand hygiene was performed before donning protective equipment and after removal when leaving the room.          Electronically signed by Vicki Owens DO at 21 1424     Vicki Owens DO at 21 1347              Name: Claudia Price ADMIT: 10/25/2021   : 1975  PCP: Malika Magana CRNP    MRN: [id number] LOS: 8 days   AGE/SEX: 46 y.o. female  ROOM: [facility]     Subjective   Subjective   Patient eager to go home but also still short of breath with minimal exertion.  Amenable to staying until tomorrow  Does not feel that gabapentin is helping with her back pain, requests to stop    Review of Systems  Denies chest pain, fevers, abdominal pain,  diarrhea    Objective   Objective   Vital Signs  Temp:  [97.9 °F (36.6 °C)-98.4 °F (36.9 °C)] 97.9 °F (36.6 °C)  Heart Rate:  [] 97  Resp:  [20] 20  BP: (107-119)/(69-85) 107/69  SpO2:  [86 %-97 %] 90 %  on  Flow (L/min):  [3-5] 3;   Device (Oxygen Therapy): nasal cannula; humidified  Body mass index is 24.13 kg/m².  Physical Exam  Constitutional:       General: She is not in acute distress.     Appearance: She is not diaphoretic.   Cardiovascular:      Rate and Rhythm: Normal rate and regular rhythm.      Pulses: Normal pulses.      Heart sounds: No murmur heard.  No gallop.    Pulmonary:      Effort: Pulmonary effort is normal. No respiratory distress.      Breath sounds: No wheezing or rhonchi.   Abdominal:      Palpations: Abdomen is soft.      Tenderness: There is no abdominal tenderness. There is no guarding.   Skin:     General: Skin is warm and dry.   Neurological:      Mental Status: She is alert.         Results Review     I reviewed the patient's new clinical results.  Results from last 7 days   Lab Units 11/02/21  0606 10/30/21  0718 10/29/21  0622 10/28/21  0611   WBC 10*3/mm3 13.95* 10.24 13.34* 12.75*   HEMOGLOBIN g/dL 13.3 11.9* 13.1 11.9*   PLATELETS 10*3/mm3 421 320 322 263     Results from last 7 days   Lab Units 11/02/21  0606 11/01/21  0459 10/31/21  0502 10/30/21  0718 10/29/21  0622 10/29/21  0622   SODIUM mmol/L 139 137  --  140  --  140   POTASSIUM mmol/L 4.2 4.2  --  4.5  --  4.5   CHLORIDE mmol/L 102 101  --  103  --  104   CO2 mmol/L 25.7 24.4  --  24.1  --  24.8   BUN mg/dL 17 16  --  11  --  13   CREATININE mg/dL 0.61 0.66 0.60 0.48*   < > 0.53*   GLUCOSE mg/dL 95 109*  --  107*  --  128*    < > = values in this interval not displayed.   Estimated Creatinine Clearance: 118.4 mL/min (by C-G formula based on SCr of 0.61 mg/dL).  Results from last 7 days   Lab Units 11/02/21  0606 11/01/21  0459 10/31/21  0502 10/30/21  0718   ALBUMIN g/dL 3.80 3.50 3.60 3.40*   BILIRUBIN mg/dL 0.4  0.4 0.3 0.2   ALK PHOS U/L 85 73 68 65   AST (SGOT) U/L 38* 58* 62* 52*   ALT (SGPT) U/L 86* 92* 82* 59*     Results from last 7 days   Lab Units 11/02/21  0606 11/01/21  0459 10/31/21  0502 10/30/21  0718 10/29/21  0622 10/29/21  0622   CALCIUM mg/dL 9.1 8.8  --  8.8  --  9.1   ALBUMIN g/dL 3.80 3.50 3.60 3.40*   < > 3.70   PHOSPHORUS mg/dL 4.2  --   --   --   --   --     < > = values in this interval not displayed.     Results from last 7 days   Lab Units 11/01/21  0459 10/30/21  0718 10/28/21  0611   PROCALCITONIN ng/mL 0.04 0.05 0.04     SARS-CoV-2, CHEMA   Date Value Ref Range Status   10/19/2021 Detected (A) Not Detected Final     Comment:     Patients who have a positive COVID-19 test result may now have  treatment options. Treatment options are available for patients  with mild to moderate symptoms and for hospitalized patients.  Visit our website at [email or web address] for  resources and information.  This nucleic acid amplification test was developed and its performance  characteristics determined by [employer]. Nucleic acid  amplification tests include RT-PCR and TMA. This test has not been  FDA cleared or approved. This test has been authorized by FDA under  an Emergency Use Authorization (EUA). This test is only authorized  for the duration of time the declaration that circumstances exist  justifying the authorization of the emergency use of in vitro  diagnostic tests for detection of SARS-CoV-2 virus and/or diagnosis  of COVID-19 infection under section 564(b)(1) of the Act, 21 U.S.C.  360bbb-3(b) (1), unless the authorization is terminated or revoked  sooner.  When diagnostic testing is negative, the possibility of a false  negative result should be considered in the context of a patient's  recent exposures and the presence of clinical signs and symptoms  consistent with COVID-19. An individual without symptoms of COVID-19  and who is not shedding SARS-CoV-2 virus would expect to  have a  negative (not detected) result in this assay.   12/24/2020 Not Detected Not Detected Final     Comment:     This nucleic acid amplification test was developed and its performance  characteristics determined by "[employer]. Nucleic acid  amplification tests include PCR and TMA. This test has not been FDA  cleared or approved. This test has been authorized by FDA under an  Emergency Use Authorization (EUA). This test is only authorized for  the duration of time the declaration that circumstances exist  justifying the authorization of the emergency use of in vitro  diagnostic tests for detection of SARS-CoV-2 virus and/or diagnosis  of COVID-19 infection under section 564(b)(1) of the Act, 21 U.S.C.  360bbb-3(b) (1), unless the authorization is terminated or revoked  sooner.  When diagnostic testing is negative, the possibility of a false  negative result should be considered in the context of a patient's  recent exposures and the presence of clinical signs and symptoms  consistent with COVID-19. An individual without symptoms of COVID-19  and who is not shedding SARS-CoV-2 virus would expect to have a  negative (not detected) result in this assay.     No results found for: HGBA1C, POCGLU    XR Chest 1 View  Narrative: SINGLE VIEW OF THE CHEST     HISTORY: COVID     COMPARISON: 10/25/2021     FINDINGS:  Bilateral infiltrates are unchanged when compared to prior exam. Heart  size is within normal limits. No pneumothorax or pleural effusion is  seen.     Impression: No significant interval change.     This report was finalized on 10/31/2021 5:48 AM by Dr. Jaida Proctor M.D.       I reviewed the patient's daily medications.  Scheduled Medications  arformoterol, 15 mcg, Nebulization, BID - RT  cetirizine, 10 mg, Oral, Daily  [START ON 11/3/2021] dexamethasone, 6 mg, Oral, Daily With Breakfast  enoxaparin, 40 mg, Subcutaneous, Nightly  First Mouthwash (Magic Mouthwash), 5 mL, Swish & Spit,  Q6H  fluticasone, 1 spray, Nasal, Daily  lidocaine, 1 patch, Transdermal, Q24H  pantoprazole, 40 mg, Oral, Q AM  polyethylene glycol, 17 g, Oral, Daily  senna-docusate sodium, 2 tablet, Oral, BID  sodium chloride, 2 spray, Each Nare, BID    Infusions   Diet  Diet Regular; Vegetarian; Lacto-Ovo: Allows Dairy Products & Eggs      Assessment/Plan     Active Hospital Problems    Diagnosis  POA   • **Pneumonia due to COVID-19 virus [U07.1, J12.82]  Yes   • Mucositis oral [K12.30]  No   • Elevated LFTs [R79.89]  Yes   • Acute respiratory failure with hypoxia (HCC) [J96.01]  Yes   • Steroid-induced hyperglycemia [R73.9, T38.0X5A]  No   • Factor 5 Leiden mutation, heterozygous (HCC) [D68.51]  Yes   • Raynaud phenomenon [I73.00]  Yes   • 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       46 y.o. female with history of chronic low back pain who was admitted with acute hypoxic respiratory failure due to COVID-19 pneumonia. She was not vaccinated. She has completed a 5-day course of remdesivir. CTA chest is negative for PE.    Covid 19 PNA with Acute Hypoxic Respiratory Failure:   -She has completed a 5-day course of remdesivir. She is on dexamethasone, will decrease to daily today. Her inflammatory markers continue to improve. She had elevated D-dimer but negative CTA chest.  -Negative pro-Guero, no antibiotics for now  -Continue supportive care. Wean oxygen as tolerated.  Anticipate home tomorrow with home oxygen    Chronic low back pain:   -Stop neurontin due to little improvement. She has Norco as needed for pain.   -Continue lidocaine patch    Mucositis  -Sophie's Magic mouthwash    Elevated LFTs  - mild and likely due to COVID. Recommend rechecking as outpatient    Factor V Leiden: monitor  Asthma: No bronchospasm currently.  Bradycardia: No heart block on ekg. continue to  monitor  Constipation: Continue bowel regimen.      · Lovenox 40 mg SC daily for DVT prophylaxis.  · Full code.  · Discussed with patient  · Anticipate discharge home tomorrow with home oxygen      Vicki Owens DO  [facility]  21  13:51 EDT    Patient was placed in face mask on first look.  I wore protective equipment throughout this patient encounter including a face mask, gloves and protective eyewear.  Hand hygiene was performed before donning protective equipment and after removal when leaving the room.          Electronically signed by Vicki Owens DO at 21 1359     Caesar Guillen MD at 21 1120                                      Caesar Guillen MD                          [phone]      Patient ID:    Name:  Claudia Price    MRN:  [id number]    1975   46 y.o.  female            Patient Care Team:  Malika Magana CRNP as PCP - General (Family Medicine)    CC/ Reason for visit: Acute respiratory failure, acute COVID-19 pneumonia, acute back pain with sciatica    Subjective: Pt seen and examined this AM.  States that she slept well and is feeling better this morning again.  Able to walk around with 3 L nasal cannula.  Happy with her progress.    ROS: Denies any subjective fevers, syncope or presyncopal events, new neurological deficits, nausea or vomiting currently    Objective     Vital Signs past 24hrs    BP range: BP: (107-119)/(69-85) 107/69  Pulse range: Heart Rate:  [] 97  Resp rate range: Resp:  [20] 20  Temp range: Temp (24hrs), Av.2 °F (36.8 °C), Min:97.9 °F (36.6 °C), Max:98.4 °F (36.9 °C)      Ventilator/Non-Invasive Ventilation Settings (From admission, onward)            None          Device (Oxygen Therapy): nasal cannula; humidified       65.8 kg (145 lb); Body mass index is 24.13 kg/m².      Intake/Output Summary (Last 24 hours) at 2021 1120  Last data filed at 2021 2329  Gross per 24 hour    Intake 400 ml   Output 1600 ml   Net -1200 ml       PHYSICAL EXAM   Constitutional: Middle aged pt in bed, No acute respiratory distress, + accessory muscle use  Head: - NCAT  Eyes: No pallor.  Anicteric sclerae, EOMI.  ENMT:  Mallampati 3, no oral thrush. Moist MM.   NECK: Trachea midline, No thyromegaly, no palpable cervical lymphadenopathy  Heart: RRR, no murmur. No pedal edema   Lungs: SPIKE +, No wheezes.  Occasional crackles heard    Abdomen: Soft. No tenderness, guarding or rigidity. No palpable masses  Extremities: Extremities warm and well perfused. No cyanosis/ clubbing  Neuro: Conscious, answers appropriately, no gross focal neuro deficits  Psych: Mood and affect -anxious    PPE recommended per [facility] [place] by [facility] infectious disease Isolation protocol for the current clinical scenario(as mentioned below) was followed.     Scheduled meds:  arformoterol, 15 mcg, Nebulization, BID - RT  cetirizine, 10 mg, Oral, Daily  dexamethasone, 6 mg, Oral, BID  enoxaparin, 40 mg, Subcutaneous, Nightly  First Mouthwash (Magic Mouthwash), 5 mL, Swish & Spit, Q6H  fluticasone, 1 spray, Nasal, Daily  gabapentin, 200 mg, Oral, Nightly  lidocaine, 1 patch, Transdermal, Q24H  pantoprazole, 40 mg, Oral, Q AM  polyethylene glycol, 17 g, Oral, Daily  senna-docusate sodium, 2 tablet, Oral, BID  sodium chloride, 2 spray, Each Nare, BID        IV meds:                           Data Review:      Results from last 7 days   Lab Units 11/02/21  0606 11/01/21  0459 10/31/21  0502 10/30/21  0718 10/30/21  0718 10/29/21  0622 10/29/21  0622 10/28/21  0611 10/28/21  0611   SODIUM mmol/L 139 137  --   --  140   < > 140   < > 142   POTASSIUM mmol/L 4.2 4.2  --   --  4.5   < > 4.5   < > 4.2   CHLORIDE mmol/L 102 101  --   --  103   < > 104   < > 106   CO2 mmol/L 25.7 24.4  --   --  24.1   < > 24.8   < > 24.0   BUN mg/dL 17 16  --   --  11   < > 13   < > 14   CREATININE mg/dL 0.61 0.66 0.60   < > 0.48*   < > 0.53*   < > 0.47*   CALCIUM mg/dL 9.1  8.8  --   --  8.8   < > 9.1   < > 8.6   BILIRUBIN mg/dL 0.4 0.4 0.3   < > 0.2   < > 0.2   < > <0.2   ALK PHOS U/L 85 73 68   < > 65   < > 75   < > 55   ALT (SGPT) U/L 86* 92* 82*   < > 59*   < > 57*   < > 30   AST (SGOT) U/L 38* 58* 62*   < > 52*   < > 49*   < > 31   GLUCOSE mg/dL 95 109*  --   --  107*   < > 128*   < > 116*   WBC 10*3/mm3 13.95*  --   --   --  10.24  --  13.34*   < > 12.75*   HEMOGLOBIN g/dL 13.3  --   --   --  11.9*  --  13.1   < > 11.9*   PLATELETS 10*3/mm3 421  --   --   --  320  --  322   < > 263   PROBNP pg/mL  --   --   --   --   --   --  576.6*  --   --    PROCALCITONIN ng/mL  --  0.04  --   --  0.05  --   --   --  0.04    < > = values in this interval not displayed.       Lab Results   Component Value Date    CALCIUM 9.1 11/02/2021    PHOS 4.2 11/02/2021       Results from last 7 days   Lab Units 10/26/21  2008   BLOODCX  No growth at 5 days  No growth at 5 days     COVID LABS:  Results From Last 14 Days   Lab Units 11/02/21  0606 11/01/21  0459 11/01/21  0458 10/31/21  0502 10/30/21  0836 10/30/21  0718 10/30/21  0718 10/29/21  0622 10/29/21  0622 10/28/21  0611 10/28/21  0611 10/25/21  1347 10/23/21  1744   PROBNP pg/mL  --   --   --   --   --   --   --   --  576.6*  --   --   --  60.7   CRP mg/dL  --   --   --   --   --   --  <0.30  --  0.40  --  0.51*   < >  --    D DIMER QUANT MCGFEU/mL  --   --  0.40  --  0.38  --   --   --   --   --  <0.27   < > 0.38   FERRITIN ng/mL 570.00* 591.00*  --  611.00*  --    < > 639.00*   < > 782.00*   < > 592.00*   < >  --    PROCALCITONIN ng/mL  --  0.04  --   --   --   --  0.05  --   --   --  0.04   < >  --    TROPONIN T ng/mL  --   --   --   --   --   --   --   --   --   --   --   --  <0.010    < > = values in this interval not displayed.              Results Review:    I have reviewed the relevant laboratory results and independently reviewed the chest imaging from this hospitalization including the available echocardiogram reports personally and  summarized it if/ when appropriate below    Assessment    Acute hypoxic respiratory failure; severe  Acute COVID-19 pneumonia- Unvaccinated patient  Elevated inflammatory markers  Mild hepatitis  Sciatica     RECOMMENDATIONS:  Patient is doing better and is on 3 L nasal cannula now and able to walk around the room. Inflammatory markers are improving. CT angiogram ruled out pulmonary embolism.   Continue with steroids and bronchodilators.   We will keep the patient as negative as tolerated with spot diuretics.  Continue trend inflammatory markers  OOB as tolerated     DVT prophylaxis     Discussed with the nurse.  Patient should be able to be discharged on supplemental oxygen from my standpoint.  She will need to follow-up with primary care in a week or 2 to evaluate for need of supplemental oxygen.    Caesar Guillen MD  2021    Electronically signed by Caesar Guillen MD at 21 1122     Caesar Guillen MD at 21 2413                                      Caesar Guillen MD                          [phone]      Patient ID:    Name:  Claudia Price    MRN:  [id number]    1975   46 y.o.  female            Patient Care Team:  Malika Magana CRNP as PCP - General (Family Medicine)    CC/ Reason for visit: Acute respiratory failure, acute COVID-19 pneumonia, acute back pain with sciatica    Subjective: Pt seen and examined this AM.  States that she slept well and is feeling better this morning finally.  Back pain is improved as well.    ROS: Denies any subjective fevers, syncope or presyncopal events, new neurological deficits, nausea or vomiting currently    Objective     Vital Signs past 24hrs    BP range: BP: (106-142)/(75-96) 112/85  Pulse range: Heart Rate:  [] 100  Resp rate range: Resp:  [20] 20  Temp range: Temp (24hrs), Av.1 °F (36.7 °C), Min:97.8 °F (36.6 °C), Max:98.3 °F (36.8 °C)      Ventilator/Non-Invasive Ventilation Settings (From  admission, onward)            None          Device (Oxygen Therapy): humidified; nasal cannula       65.8 kg (145 lb); Body mass index is 24.13 kg/m².      Intake/Output Summary (Last 24 hours) at 11/1/2021 1606  Last data filed at 11/1/2021 1353  Gross per 24 hour   Intake 550 ml   Output 2700 ml   Net -2150 ml       PHYSICAL EXAM   Constitutional: Middle aged pt in bed, No acute respiratory distress, + accessory muscle use  Head: - NCAT  Eyes: No pallor.  Anicteric sclerae, EOMI.  ENMT:  Mallampati 3, no oral thrush. Moist MM.   NECK: Trachea midline, No thyromegaly, no palpable cervical lymphadenopathy  Heart: RRR, no murmur. No pedal edema   Lungs: SPIKE +, No wheezes.  Occasional crackles heard    Abdomen: Soft. No tenderness, guarding or rigidity. No palpable masses  Extremities: Extremities warm and well perfused. No cyanosis/ clubbing  Neuro: Conscious, answers appropriately, no gross focal neuro deficits  Psych: Mood and affect -anxious    PPE recommended per [facility] infectious disease Isolation protocol for the current clinical scenario(as mentioned below) was followed.     Scheduled meds:  arformoterol, 15 mcg, Nebulization, BID - RT  cetirizine, 10 mg, Oral, Daily  dexamethasone, 6 mg, Oral, BID  enoxaparin, 40 mg, Subcutaneous, Nightly  First Mouthwash (Magic Mouthwash), 5 mL, Swish & Spit, Q6H  fluticasone, 1 spray, Nasal, Daily  gabapentin, 200 mg, Oral, Nightly  lidocaine, 1 patch, Transdermal, Q24H  pantoprazole, 40 mg, Oral, Q AM  polyethylene glycol, 17 g, Oral, Daily  senna-docusate sodium, 2 tablet, Oral, BID  sodium chloride, 2 spray, Each Nare, BID        IV meds:                           Data Review:      Results from last 7 days   Lab Units 11/01/21  0459 10/31/21  0502 10/30/21  0718 10/29/21  0622 10/29/21  0622 10/28/21  0611 10/28/21  0611   SODIUM mmol/L 137  --  140  --  140   < > 142   POTASSIUM mmol/L 4.2  --  4.5  --  4.5   < > 4.2   CHLORIDE mmol/L 101  --  103  --  104   <  > 106   CO2 mmol/L 24.4  --  24.1  --  24.8   < > 24.0   BUN mg/dL 16  --  11  --  13   < > 14   CREATININE mg/dL 0.66 0.60 0.48*   < > 0.53*   < > 0.47*   CALCIUM mg/dL 8.8  --  8.8  --  9.1   < > 8.6   BILIRUBIN mg/dL 0.4 0.3 0.2   < > 0.2   < > <0.2   ALK PHOS U/L 73 68 65   < > 75   < > 55   ALT (SGPT) U/L 92* 82* 59*   < > 57*   < > 30   AST (SGOT) U/L 58* 62* 52*   < > 49*   < > 31   GLUCOSE mg/dL 109*  --  107*  --  128*   < > 116*   WBC 10*3/mm3  --   --  10.24  --  13.34*  --  12.75*   HEMOGLOBIN g/dL  --   --  11.9*  --  13.1  --  11.9*   PLATELETS 10*3/mm3  --   --  320  --  322  --  263   PROBNP pg/mL  --   --   --   --  576.6*  --   --    PROCALCITONIN ng/mL 0.04  --  0.05  --   --   --  0.04    < > = values in this interval not displayed.       Lab Results   Component Value Date    CALCIUM 8.8 11/01/2021       Results from last 7 days   Lab Units 10/26/21  2008   BLOODCX  No growth at 5 days  No growth at 5 days     COVID LABS:  Results From Last 14 Days   Lab Units 11/01/21  0459 11/01/21  0458 10/31/21  0502 10/30/21  0836 10/30/21  0718 10/29/21  0622 10/29/21  0622 10/28/21  0611 10/28/21  0611 10/25/21  1347 10/23/21  1744   PROBNP pg/mL  --   --   --   --   --   --  576.6*  --   --   --  60.7   CRP mg/dL  --   --   --   --  <0.30  --  0.40  --  0.51*   < >  --    D DIMER QUANT MCGFEU/mL  --  0.40  --  0.38  --   --   --   --  <0.27   < > 0.38   FERRITIN ng/mL 591.00*  --  611.00*  --  639.00*   < > 782.00*   < > 592.00*   < >  --    PROCALCITONIN ng/mL 0.04  --   --   --  0.05  --   --   --  0.04   < >  --    TROPONIN T ng/mL  --   --   --   --   --   --   --   --   --   --  <0.010    < > = values in this interval not displayed.              Results Review:    I have reviewed the relevant laboratory results and independently reviewed the chest imaging from this hospitalization including the available echocardiogram reports personally and summarized it if/ when appropriate below    Assessment     Acute hypoxic respiratory failure; severe  Acute COVID-19 pneumonia- Unvaccinated patient  Elevated inflammatory markers  Mild hepatitis  Sciatica     RECOMMENDATIONS:  Patient still with significant supplemental oxygen. Inflammatory markers are plateauing and so is her clinical status. CT angiogram ruled out pulmonary embolism.   Continue with high-dose steroids and s/p remdesivir  Continue with bronchodilators.   We will keep the patient as negative as tolerated with spot diuretics.  Continue trend inflammatory markers  OOB as tolerated     DVT prophylaxis -emotional support provided    Caesar Guillen MD  11/1/2021    Electronically signed by Caesar Guillen MD at 11/01/21 1600          Consult Notes (last 72 hours)      Vane Beck at 11/02/21 1119      Consult Orders    1. Inpatient Spiritual Care Consult [[id number]] ordered by Vicki Owens DO at 10/30/21 1056             I was requested to see patient to provide emotional and spiritual support.  The patient was glad to have some company and to have someone to talk to regarding some of anxiety.  We had a nice conversation.  She is a member of [facility] [employer].  Her Rastafari family has been sending her emails and verses to provide her support.  She is looking forward to being discharged, it has been a tough journey for her.  She is very open to prayer and we concluded our time with prayer.      Electronically signed by Vane Beck at 11/02/21 6325

## 2021-11-04 NOTE — PROGRESS NOTES
Caesar Guillen MD                          [phone]      Patient ID:    Name:  Claudia Price    MRN:  [id number]    1975   46 y.o.  female            Patient Care Team:  Malika Magana CRNP as PCP - General (Family Medicine)    CC/ Reason for visit: Acute respiratory failure, acute COVID-19 pneumonia, acute back pain with sciatica    Subjective: Pt seen and examined this AM.  Continues to require up to 4 L nasal cannula with sats in the high 80s.  Improved anxiety and restlessness in comparison to yesterday but overall anxious. Trying to get comfortable with appropriate position with regards to back pain and spasms.  Chest x-ray ordered by primary.  Discussed with Dr. Rose and appreciate input    ROS: Denies any subjective fevers, syncope or presyncopal events, new neurological deficits, nausea or vomiting currently    Objective     Vital Signs past 24hrs    BP range: BP: (104-115)/(68-77) 115/77  Pulse range: Heart Rate:  [] 96  Resp rate range: Resp:  [18] 18  Temp range: Temp (24hrs), Av.3 °F (36.8 °C), Min:97.8 °F (36.6 °C), Max:98.6 °F (37 °C)      Ventilator/Non-Invasive Ventilation Settings (From admission, onward)            None          Device (Oxygen Therapy): nasal cannula       65.8 kg (145 lb); Body mass index is 24.13 kg/m².      Intake/Output Summary (Last 24 hours) at 2021 1558  Last data filed at 2021 0600  Gross per 24 hour   Intake --   Output 1900 ml   Net -1900 ml       PHYSICAL EXAM   Constitutional: Middle aged pt in bed, No acute respiratory distress, + accessory muscle use  Head: - NCAT  Eyes: No pallor.  Anicteric sclerae, EOMI.  ENMT:  Mallampati 3, no oral thrush. Moist MM.   NECK: Trachea midline, No thyromegaly, no palpable cervical lymphadenopathy  Heart: RRR, no murmur. No pedal edema   Lungs: SPIKE +, No wheezes.  Occasional crackles heard    Abdomen: Soft. No tenderness, guarding or rigidity. No palpable  masses  Extremities: Extremities warm and well perfused. No cyanosis/ clubbing  Neuro: Conscious, answers appropriately, no gross focal neuro deficits  Psych: Mood and affect -anxious/tearful    PPE recommended per [facility] infectious disease Isolation protocol for the current clinical scenario(as mentioned below) was followed.     Scheduled meds:  arformoterol, 15 mcg, Nebulization, BID - RT  cetirizine, 10 mg, Oral, Daily  dexamethasone, 6 mg, Oral, Daily With Breakfast  enoxaparin, 40 mg, Subcutaneous, Nightly  fluticasone, 1 spray, Nasal, Daily  gabapentin, 200 mg, Oral, Nightly  lidocaine, 1 patch, Transdermal, Q24H  nystatin, 5 mL, Oral, 4x Daily  pantoprazole, 40 mg, Oral, Q AM  polyethylene glycol, 17 g, Oral, Daily  senna-docusate sodium, 2 tablet, Oral, BID  sodium chloride, 2 spray, Each Nare, BID        IV meds:                           Data Review:      Results from last 7 days   Lab Units 11/04/21  0551 11/03/21  0805 11/02/21  0606 11/01/21  0459 10/31/21  0502 10/30/21  0718 10/30/21  0718 10/29/21  0622 10/29/21  0622   SODIUM mmol/L 138 138 139 137  --    < > 140   < > 140   POTASSIUM mmol/L 4.3  4.3 3.4* 4.2 4.2  --    < > 4.5   < > 4.5   CHLORIDE mmol/L 104 99 102 101  --    < > 103   < > 104   CO2 mmol/L 24.3 26.1 25.7 24.4  --    < > 24.1   < > 24.8   BUN mg/dL 12 14 17 16  --    < > 11   < > 13   CREATININE mg/dL 0.51* 0.63 0.61 0.66   < >   < > 0.48*   < > 0.53*   CALCIUM mg/dL 8.9 9.1 9.1 8.8  --    < > 8.8   < > 9.1   BILIRUBIN mg/dL 0.3 0.9 0.4 0.4   < >   < > 0.2   < > 0.2   ALK PHOS U/L 76 85 85 73   < >   < > 65   < > 75   ALT (SGPT) U/L 54* 63* 86* 92*   < >   < > 59*   < > 57*   AST (SGOT) U/L 33* 31 38* 58*   < >   < > 52*   < > 49*   GLUCOSE mg/dL 83 66 95 109*  --    < > 107*   < > 128*   WBC 10*3/mm3 13.67* 16.51* 13.95*  --   --    < > 10.24   < > 13.34*   HEMOGLOBIN g/dL 11.7* 14.3 13.3  --   --    < > 11.9*   < > 13.1   PLATELETS 10*3/mm3 324 359 421  --   --    < >  320   < > 322   PROBNP pg/mL 66.2  --   --   --   --   --   --   --  576.6*   PROCALCITONIN ng/mL  --  0.07  --  0.04  --   --  0.05  --   --     < > = values in this interval not displayed.       Lab Results   Component Value Date    CALCIUM 8.9 11/04/2021    PHOS 4.2 11/02/2021           COVID LABS:  Results From Last 14 Days   Lab Units 11/04/21  0551 11/03/21  0805 11/02/21  0606 11/01/21  0459 11/01/21  0459 11/01/21  0458 10/31/21  0502 10/30/21  0836 10/30/21  0718 10/29/21  0622 10/29/21  0622 10/28/21  0611 10/28/21  0611 10/25/21  1347 10/23/21  1744   PROBNP pg/mL 66.2  --   --   --   --   --   --   --   --   --  576.6*  --   --   --  60.7   CRP mg/dL  --   --   --   --   --   --   --   --  <0.30  --  0.40  --  0.51*   < >  --    D DIMER QUANT MCGFEU/mL  --  0.63*  --   --   --  0.40  --  0.38  --   --   --    < > <0.27   < > 0.38   FERRITIN ng/mL 772.00* 745.00* 570.00*   < > 591.00*  --    < >  --  639.00*   < > 782.00*   < > 592.00*   < >  --    PROCALCITONIN ng/mL  --  0.07  --   --  0.04  --   --   --  0.05  --   --    < > 0.04   < >  --    TROPONIN T ng/mL  --   --   --   --   --   --   --   --   --   --   --   --   --   --  <0.010    < > = values in this interval not displayed.              Results Review:    I have reviewed the relevant laboratory results and independently reviewed the chest imaging from this hospitalization including the available echocardiogram reports personally and summarized it if/ when appropriate below    Assessment    Acute hypoxic respiratory failure; severe  Acute COVID-19 pneumonia- Unvaccinated patient  Elevated inflammatory markers  Mild hepatitis  Sciatica     RECOMMENDATIONS:  Patient is still with 3 - 4 L nasal cannula. Sats in the high 80s with any conversation.  Inflammatory markers are still up today. CT angiogram ruled out pulmonary embolism.  Repeat chest x-ray pending.  Continue with steroids  Continue with bronchodilators.  We will start Symbicort to help  with home.  Does not want to be on duo nebs due to anxiety issues.  Back spasm/pain and anxiety playing a role in recovery  Continue trend inflammatory markers  OOB as tolerated     DVT prophylaxis     Discussed with . Will reassess in AM. Hopefully looks better to go.  She will need to follow-up with primary care in a week or 2 to evaluate for need of supplemental oxygen.    Caesar Guillen MD  11/4/2021

## 2021-11-04 NOTE — PLAN OF CARE
Goal Outcome Evaluation:  Plan of Care Reviewed With: patient        Progress: no change  Outcome Summary: Pt A&Ox4, 4L NC with some desats when moving about, reg vegaterian diet, up ad eve, PRN pain meds and muscle relaxers given, slept prone for several hours this shift. Anxiety continues to be a large factor of her coping with current situation

## 2021-11-04 NOTE — PLAN OF CARE
Goal Outcome Evaluation:  Plan of Care Reviewed With: patient           Outcome Summary: Pt alert and oriented x 4.  Currently on 4L O2 per nasal cannula.  O2 sats decrease with activity.  Pt noted with anxiety.  Prn xanax ordered by md.  patient has not yet wanted to take any.   PRN pain medications given for chronic low back pain. Res continues on dexamethasone, and lovenox .   Will continue to monitor.

## 2021-11-04 NOTE — NURSING NOTE
Delivered food to patient sent in from brother.  Patient up at this time standing in room. States she has been trying to ambulate a bit in room.

## 2021-11-04 NOTE — NURSING NOTE
Upon entry into room,  Patient very anxious.  Lying back in bed.  On 4L O2 NC,  Saturations decreasing down as low as 83% while in room.  Patient states she just gets nervous and she thinks its affecting her O2.   Nurse provided reassurance and encouraged patient to take some breaths.  While in room patient o2 sats continue to fluctuate between 85-92%  .  After giving medications,  Patient decided to lay prone for a while.   Upon exit of room sats up to 96%.  Will continue to monitor.

## 2021-11-04 NOTE — PROGRESS NOTES
[facility]    [phone]   LOS: 10 days     Name: Claudia Price  Age/Sex: 46 y.o. female  :  1975        PCP: Malika Magana CRNP  Chief Complaint   Patient presents with   • Shortness of Breath   • Hip Pain   • Leg Pain      Subjective   She is rather frustrated and clearly anxious and depressed.  She avoided eye contact for the majority of our encounter.  She seems very sullen.  She is frustrated that her oxygen levels are going down today.  She is worried that things are going well.  She does not feel much different other than she does feel anxious.  She denies any fevers or chills.  Denies any nausea vomiting.  She has had some constipation related to the opiates she has been taking for back pain.  General: No Fever or Chills, Cardiac: No Chest Pain or Palpitations, GI: No Nausea, Vomiting, or Diarrhea and Other: No bleeding    arformoterol, 15 mcg, Nebulization, BID - RT  cetirizine, 10 mg, Oral, Daily  dexamethasone, 6 mg, Oral, Daily With Breakfast  enoxaparin, 40 mg, Subcutaneous, Nightly  fluticasone, 1 spray, Nasal, Daily  gabapentin, 200 mg, Oral, Nightly  lidocaine, 1 patch, Transdermal, Q24H  nystatin, 5 mL, Oral, 4x Daily  pantoprazole, 40 mg, Oral, Q AM  polyethylene glycol, 17 g, Oral, Daily  senna-docusate sodium, 2 tablet, Oral, BID  sodium chloride, 2 spray, Each Nare, BID           Objective   Vital Signs  Temp:  [97.8 °F (36.6 °C)-99.3 °F (37.4 °C)] 97.8 °F (36.6 °C)  Heart Rate:  [] 72  Resp:  [18] 18  BP: (100-113)/(59-71) 104/71  Body mass index is 24.13 kg/m².    Intake/Output Summary (Last 24 hours) at 2021 0550  Last data filed at 2021 0128  Gross per 24 hour   Intake --   Output 1700 ml   Net -1700 ml       Physical Exam  Vitals and nursing note reviewed.   Constitutional:       Appearance: She is ill-appearing.   HENT:      Head: Normocephalic and atraumatic.   Cardiovascular:      Rate and Rhythm: Normal rate and regular rhythm.    Pulmonary:      Effort: Pulmonary effort is normal. No respiratory distress.      Breath sounds: Rales present.      Comments: Noted crackles in the right lower lobe  Abdominal:      General: Bowel sounds are normal. There is no distension.      Palpations: Abdomen is soft.   Skin:     General: Skin is warm.      Coloration: Skin is pale.   Neurological:      General: No focal deficit present.      Mental Status: She is alert and oriented to person, place, and time.   Psychiatric:      Comments: She is clearly depressed and avoided eye contact.  She has been anxious at times.  Her thought process is somewhat fatalistic at this point.           Results Review:       I reviewed the patient's new clinical results.  Results from last 7 days   Lab Units 11/04/21  0551 11/03/21  0805 11/02/21  0606 10/30/21  0718 10/29/21  0622   WBC 10*3/mm3 13.67* 16.51* 13.95* 10.24 13.34*   HEMOGLOBIN g/dL 11.7* 14.3 13.3 11.9* 13.1   PLATELETS 10*3/mm3 324 359 421 320 322     Results from last 7 days   Lab Units 11/04/21  0551 11/03/21  0805 11/02/21  0606 11/01/21  0459 10/31/21  0502 10/30/21  0718 10/29/21  0622   SODIUM mmol/L 138 138 139 137  --  140 140   POTASSIUM mmol/L 4.3  4.3 3.4* 4.2 4.2  --  4.5 4.5   CHLORIDE mmol/L 104 99 102 101  --  103 104   CO2 mmol/L 24.3 26.1 25.7 24.4  --  24.1 24.8   BUN mg/dL 12 14 17 16  --  11 13   CREATININE mg/dL 0.51* 0.63 0.61 0.66 0.60 0.48* 0.53*   CALCIUM mg/dL 8.9 9.1 9.1 8.8  --  8.8 9.1   PHOSPHORUS mg/dL  --   --  4.2  --   --   --   --    Estimated Creatinine Clearance: 114.7 mL/min (by C-G formula based on SCr of 0.63 mg/dL).      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       PLAN  This is a 46-year-old female with history of mild uncomplicated asthma who presents to the hospital with shortness of breath and is found to have Covid pneumonia with hypoxic respiratory failure.    Covid 19 PNA with Acute Hypoxic Respiratory Failure:   -She has completed a 5-day course of remdesivir. She is on twice daily dexamethasone. Her inflammatory markers were increasing yesterday but are back down today.  She had negative CTA chest 5 days ago, given the worsening in her hypoxia I will repeat her chest x-ray today.  We will also add a BNP to her morning labs.  Was done a few days ago was elevated.  She does have some crackles in the right lower lobe on exam today.  -Negative pro-Guero, no antibiotics for now  -Continue supportive care. Wean oxygen as tolerated.       Chronic low back pain:   -Gabapentin 200 nightly started.  She has Norco as needed for pain.   -Continue lidocaine patch and Flexeril     Mucositis  -She wants to try nystatin swish and spit     Elevated LFTs  - mild and likely due to COVID.     Depression and anxiety  -She is dealing with pretty significant situational depression and anxiety on.  I do believe the social isolation is probably starting to get to the patient some.  -Could consider initiation of an antidepressant here in the hospital.  Hold off for now as the patient was not very enthusiastic about medications.  I have added as needed Xanax to her medications given her anxiety.  Have informed the patient that it is ordered if needed but she is not sure if she is ready to take this or not.    Factor V Leiden: monitor  Asthma: No bronchospasm currently.  Bradycardia: No heart block  on ekg. continue to monitor  Constipation: Resolved.  Continue bowel regimen.        · Lovenox 40 mg SC daily for DVT prophylaxis.  · Full code.  · Discussed with patient  · Anticipate discharge once her oxygen levels have stabilized, she still in quite a tenuous situation with fluctuating oxygen requirements and sitting right on the cusp of needing more than 4 L.          Disposition  To be determined      Montrell Rose MD  [facility]  11/04/21  05:50 EDT

## 2021-11-05 LAB
ALBUMIN SERPL-MCNC: 3.5 G/DL (ref 3.5–5.2)
ALBUMIN/GLOB SERPL: 1.1 G/DL
ALP SERPL-CCNC: 84 U/L (ref 39–117)
ALT SERPL W P-5'-P-CCNC: 110 U/L (ref 1–33)
ANION GAP SERPL CALCULATED.3IONS-SCNC: 12.1 MMOL/L (ref 5–15)
AST SERPL-CCNC: 57 U/L (ref 1–32)
BASOPHILS # BLD AUTO: 0.03 10*3/MM3 (ref 0–0.2)
BASOPHILS NFR BLD AUTO: 0.2 % (ref 0–1.5)
BILIRUB SERPL-MCNC: 0.2 MG/DL (ref 0–1.2)
BUN SERPL-MCNC: 15 MG/DL (ref 6–20)
BUN/CREAT SERPL: 27.8 (ref 7–25)
CALCIUM SPEC-SCNC: 9.3 MG/DL (ref 8.6–10.5)
CHLORIDE SERPL-SCNC: 101 MMOL/L (ref 98–107)
CO2 SERPL-SCNC: 26.9 MMOL/L (ref 22–29)
CREAT SERPL-MCNC: 0.54 MG/DL (ref 0.57–1)
D DIMER PPP FEU-MCNC: 0.88 MCGFEU/ML (ref 0–0.49)
DEPRECATED RDW RBC AUTO: 39.2 FL (ref 37–54)
EOSINOPHIL # BLD AUTO: 0.06 10*3/MM3 (ref 0–0.4)
EOSINOPHIL NFR BLD AUTO: 0.5 % (ref 0.3–6.2)
ERYTHROCYTE [DISTWIDTH] IN BLOOD BY AUTOMATED COUNT: 13 % (ref 12.3–15.4)
FERRITIN SERPL-MCNC: 765 NG/ML (ref 13–150)
GFR SERPL CREATININE-BSD FRML MDRD: 121 ML/MIN/1.73
GLOBULIN UR ELPH-MCNC: 3.3 GM/DL
GLUCOSE SERPL-MCNC: 96 MG/DL (ref 65–99)
HCT VFR BLD AUTO: 36.5 % (ref 34–46.6)
HGB BLD-MCNC: 12.1 G/DL (ref 12–15.9)
IMM GRANULOCYTES # BLD AUTO: 0.16 10*3/MM3 (ref 0–0.05)
IMM GRANULOCYTES NFR BLD AUTO: 1.3 % (ref 0–0.5)
LYMPHOCYTES # BLD AUTO: 0.73 10*3/MM3 (ref 0.7–3.1)
LYMPHOCYTES NFR BLD AUTO: 6 % (ref 19.6–45.3)
MAGNESIUM SERPL-MCNC: 2.3 MG/DL (ref 1.6–2.6)
MAGNESIUM SERPL-MCNC: 2.3 MG/DL (ref 1.6–2.6)
MCH RBC QN AUTO: 27.8 PG (ref 26.6–33)
MCHC RBC AUTO-ENTMCNC: 33.2 G/DL (ref 31.5–35.7)
MCV RBC AUTO: 83.7 FL (ref 79–97)
MONOCYTES # BLD AUTO: 0.66 10*3/MM3 (ref 0.1–0.9)
MONOCYTES NFR BLD AUTO: 5.4 % (ref 5–12)
NEUTROPHILS NFR BLD AUTO: 10.53 10*3/MM3 (ref 1.7–7)
NEUTROPHILS NFR BLD AUTO: 86.6 % (ref 42.7–76)
NRBC BLD AUTO-RTO: 0.1 /100 WBC (ref 0–0.2)
PHOSPHATE SERPL-MCNC: 3.6 MG/DL (ref 2.5–4.5)
PLATELET # BLD AUTO: 316 10*3/MM3 (ref 140–450)
PMV BLD AUTO: 9.7 FL (ref 6–12)
POTASSIUM SERPL-SCNC: 4.1 MMOL/L (ref 3.5–5.2)
POTASSIUM SERPL-SCNC: 4.1 MMOL/L (ref 3.5–5.2)
PROCALCITONIN SERPL-MCNC: 0.15 NG/ML (ref 0–0.25)
PROT SERPL-MCNC: 6.8 G/DL (ref 6–8.5)
RBC # BLD AUTO: 4.36 10*6/MM3 (ref 3.77–5.28)
SODIUM SERPL-SCNC: 140 MMOL/L (ref 136–145)
WBC # BLD AUTO: 12.17 10*3/MM3 (ref 3.4–10.8)

## 2021-11-05 PROCEDURE — 25010000002 ENOXAPARIN PER 10 MG: Performed by: INTERNAL MEDICINE

## 2021-11-05 PROCEDURE — 80053 COMPREHEN METABOLIC PANEL: CPT | Performed by: STUDENT IN AN ORGANIZED HEALTH CARE EDUCATION/TRAINING PROGRAM

## 2021-11-05 PROCEDURE — 94799 UNLISTED PULMONARY SVC/PX: CPT

## 2021-11-05 PROCEDURE — 85025 COMPLETE CBC W/AUTO DIFF WBC: CPT | Performed by: STUDENT IN AN ORGANIZED HEALTH CARE EDUCATION/TRAINING PROGRAM

## 2021-11-05 PROCEDURE — 83735 ASSAY OF MAGNESIUM: CPT | Performed by: HOSPITALIST

## 2021-11-05 PROCEDURE — 84100 ASSAY OF PHOSPHORUS: CPT | Performed by: HOSPITALIST

## 2021-11-05 PROCEDURE — 36415 COLL VENOUS BLD VENIPUNCTURE: CPT | Performed by: STUDENT IN AN ORGANIZED HEALTH CARE EDUCATION/TRAINING PROGRAM

## 2021-11-05 PROCEDURE — 94664 DEMO&/EVAL PT USE INHALER: CPT

## 2021-11-05 PROCEDURE — 63710000001 DEXAMETHASONE PER 0.25 MG: Performed by: INTERNAL MEDICINE

## 2021-11-05 PROCEDURE — 82728 ASSAY OF FERRITIN: CPT | Performed by: INTERNAL MEDICINE

## 2021-11-05 PROCEDURE — 84145 PROCALCITONIN (PCT): CPT | Performed by: INTERNAL MEDICINE

## 2021-11-05 PROCEDURE — 84132 ASSAY OF SERUM POTASSIUM: CPT

## 2021-11-05 PROCEDURE — 85379 FIBRIN DEGRADATION QUANT: CPT | Performed by: INTERNAL MEDICINE

## 2021-11-05 PROCEDURE — 94760 N-INVAS EAR/PLS OXIMETRY 1: CPT

## 2021-11-05 PROCEDURE — 94640 AIRWAY INHALATION TREATMENT: CPT

## 2021-11-05 RX ORDER — ALPRAZOLAM 0.5 MG/1
0.5 TABLET ORAL 3 TIMES DAILY PRN
Status: DISCONTINUED | OUTPATIENT
Start: 2021-11-05 | End: 2021-11-08 | Stop reason: HOSPADM

## 2021-11-05 RX ADMIN — DOCUSATE SODIUM 50MG AND SENNOSIDES 8.6MG 2 TABLET: 8.6; 5 TABLET, FILM COATED ORAL at 09:27

## 2021-11-05 RX ADMIN — FLUTICASONE PROPIONATE 1 SPRAY: 50 SPRAY, METERED NASAL at 09:17

## 2021-11-05 RX ADMIN — NYSTATIN 500000 UNITS: 100000 SUSPENSION ORAL at 11:48

## 2021-11-05 RX ADMIN — CETIRIZINE HYDROCHLORIDE 10 MG: 10 TABLET ORAL at 09:14

## 2021-11-05 RX ADMIN — HYDROCODONE BITARTRATE AND ACETAMINOPHEN 1 TABLET: 5; 325 TABLET ORAL at 12:26

## 2021-11-05 RX ADMIN — BISACODYL 10 MG: 10 SUPPOSITORY RECTAL at 11:48

## 2021-11-05 RX ADMIN — HYDROCODONE BITARTRATE AND ACETAMINOPHEN 1 TABLET: 5; 325 TABLET ORAL at 06:36

## 2021-11-05 RX ADMIN — POLYETHYLENE GLYCOL 3350 17 G: 17 POWDER, FOR SOLUTION ORAL at 09:15

## 2021-11-05 RX ADMIN — NYSTATIN 500000 UNITS: 100000 SUSPENSION ORAL at 09:14

## 2021-11-05 RX ADMIN — SALINE NASAL SPRAY 2 SPRAY: 1.5 SOLUTION NASAL at 20:20

## 2021-11-05 RX ADMIN — ALPRAZOLAM 0.5 MG: 0.5 TABLET ORAL at 17:55

## 2021-11-05 RX ADMIN — ENOXAPARIN SODIUM 40 MG: 40 INJECTION SUBCUTANEOUS at 20:19

## 2021-11-05 RX ADMIN — SALINE NASAL SPRAY 2 SPRAY: 1.5 SOLUTION NASAL at 09:18

## 2021-11-05 RX ADMIN — DEXAMETHASONE 6 MG: 4 TABLET ORAL at 09:14

## 2021-11-05 RX ADMIN — BUDESONIDE AND FORMOTEROL FUMARATE DIHYDRATE 2 PUFF: 160; 4.5 AEROSOL RESPIRATORY (INHALATION) at 08:55

## 2021-11-05 RX ADMIN — PANTOPRAZOLE SODIUM 40 MG: 40 TABLET, DELAYED RELEASE ORAL at 06:31

## 2021-11-05 RX ADMIN — NYSTATIN 500000 UNITS: 100000 SUSPENSION ORAL at 18:00

## 2021-11-05 RX ADMIN — CYCLOBENZAPRINE 10 MG: 10 TABLET, FILM COATED ORAL at 17:55

## 2021-11-05 RX ADMIN — LIDOCAINE 1 PATCH: 50 PATCH CUTANEOUS at 09:14

## 2021-11-05 RX ADMIN — ALPRAZOLAM 0.25 MG: 0.25 TABLET ORAL at 09:28

## 2021-11-05 RX ADMIN — HYDROCODONE BITARTRATE AND ACETAMINOPHEN 1 TABLET: 5; 325 TABLET ORAL at 00:19

## 2021-11-05 RX ADMIN — BUDESONIDE AND FORMOTEROL FUMARATE DIHYDRATE 2 PUFF: 160; 4.5 AEROSOL RESPIRATORY (INHALATION) at 21:36

## 2021-11-05 RX ADMIN — ARFORMOTEROL TARTRATE 15 MCG: 15 SOLUTION RESPIRATORY (INHALATION) at 19:19

## 2021-11-05 RX ADMIN — DOCUSATE SODIUM 50MG AND SENNOSIDES 8.6MG 2 TABLET: 8.6; 5 TABLET, FILM COATED ORAL at 20:19

## 2021-11-05 RX ADMIN — HYDROCODONE BITARTRATE AND ACETAMINOPHEN 1 TABLET: 5; 325 TABLET ORAL at 20:18

## 2021-11-05 RX ADMIN — ARFORMOTEROL TARTRATE 15 MCG: 15 SOLUTION RESPIRATORY (INHALATION) at 11:40

## 2021-11-05 RX ADMIN — GABAPENTIN 200 MG: 100 CAPSULE ORAL at 20:19

## 2021-11-05 RX ADMIN — CYCLOBENZAPRINE 10 MG: 10 TABLET, FILM COATED ORAL at 00:19

## 2021-11-05 RX ADMIN — CYCLOBENZAPRINE 10 MG: 10 TABLET, FILM COATED ORAL at 09:14

## 2021-11-05 RX ADMIN — NYSTATIN 500000 UNITS: 100000 SUSPENSION ORAL at 20:20

## 2021-11-05 NOTE — CASE MANAGEMENT/SOCIAL WORK
Continued Stay Note  [facility]     Patient Name: Claudia Price  MRN: [id number]  Today's Date: 11/5/2021    Admit Date: 10/25/2021     Discharge Plan     Row Name 11/05/21 1342       Plan    Plan Home with family    Patient/Family in Agreement with Plan yes    Plan Comments Plan remains to return home at [place]. CCP following and will assist with home oxygen needs. Megan Valadez RN CCP               Discharge Codes    No documentation.               Expected Discharge Date and Time     Expected Discharge Date Expected Discharge Time    Nov 7, 2021             Megan Valadez RN

## 2021-11-05 NOTE — PROGRESS NOTES
Caesar Guillen MD                          [phone]      Patient ID:    Name:  Claudia Price    MRN:  [id number]    1975   46 y.o.  female            Patient Care Team:  Malika Magana CRNP as PCP - General (Family Medicine)    CC/ Reason for visit: Acute respiratory failure, acute COVID-19 pneumonia, acute back pain with sciatica    Subjective: Pt seen and examined this AM.  Patient a little more better this morning sats in the low 90s while on 2 L nasal cannula.  States that she is feeling a lot better.  Was going to go home and get help from her brother who is a EMT but he had to leave back to [place].  She plans to stay with her parents but is concerned that they might panic if she has issues when she is home    ROS: Denies any subjective fevers, syncope or presyncopal events, new neurological deficits, nausea or vomiting currently    Objective     Vital Signs past 24hrs    BP range: BP: (111-121)/(73-87) 115/76  Pulse range: Heart Rate:  [] 112  Resp rate range: Resp:  [18] 18  Temp range: Temp (24hrs), Av.2 °F (36.8 °C), Min:97.2 °F (36.2 °C), Max:98.7 °F (37.1 °C)      Ventilator/Non-Invasive Ventilation Settings (From admission, onward)            None          Device (Oxygen Therapy): nasal cannula; humidified       65.8 kg (145 lb); Body mass index is 24.13 kg/m².      Intake/Output Summary (Last 24 hours) at 2021 1458  Last data filed at 2021 1405  Gross per 24 hour   Intake --   Output 3200 ml   Net -3200 ml       PHYSICAL EXAM   Constitutional: Middle aged pt in bed, No acute respiratory distress, + accessory muscle use  Head: - NCAT  Eyes: No pallor.  Anicteric sclerae, EOMI.  ENMT:  Mallampati 3, no oral thrush. Moist MM.   NECK: Trachea midline, No thyromegaly, no palpable cervical lymphadenopathy  Heart: RRR, no murmur. No pedal edema   Lungs: SPIKE +, No wheezes.  Occasional crackles heard    Abdomen: Soft. No  tenderness, guarding or rigidity. No palpable masses  Extremities: Extremities warm and well perfused. No cyanosis/ clubbing  Neuro: Conscious, answers appropriately, no gross focal neuro deficits  Psych: Mood and affect -anxious/tearful    PPE recommended per [facility] infectious disease Isolation protocol for the current clinical scenario(as mentioned below) was followed.     Scheduled meds:  arformoterol, 15 mcg, Nebulization, BID - RT  budesonide-formoterol, 2 puff, Inhalation, BID - RT  cetirizine, 10 mg, Oral, Daily  dexamethasone, 6 mg, Oral, Daily With Breakfast  enoxaparin, 40 mg, Subcutaneous, Nightly  fluticasone, 1 spray, Nasal, Daily  gabapentin, 200 mg, Oral, Nightly  lidocaine, 1 patch, Transdermal, Q24H  nystatin, 5 mL, Oral, 4x Daily  pantoprazole, 40 mg, Oral, Q AM  polyethylene glycol, 17 g, Oral, Daily  senna-docusate sodium, 2 tablet, Oral, BID  sodium chloride, 2 spray, Each Nare, BID        IV meds:                           Data Review:      Results from last 7 days   Lab Units 11/05/21  0447 11/05/21  0446 11/05/21  0035 11/04/21  0551 11/03/21  0805 11/03/21  0805 11/02/21  0606 11/01/21  0459   SODIUM mmol/L 140  --   --  138  --  138   < > 137   POTASSIUM mmol/L 4.1  --  4.1 4.3  4.3   < > 3.4*   < > 4.2   CHLORIDE mmol/L 101  --   --  104  --  99   < > 101   CO2 mmol/L 26.9  --   --  24.3  --  26.1   < > 24.4   BUN mg/dL 15  --   --  12  --  14   < > 16   CREATININE mg/dL 0.54*  --   --  0.51*  --  0.63   < > 0.66   CALCIUM mg/dL 9.3  --   --  8.9  --  9.1   < > 8.8   BILIRUBIN mg/dL 0.2  --   --  0.3  --  0.9   < > 0.4   ALK PHOS U/L 84  --   --  76  --  85   < > 73   ALT (SGPT) U/L 110*  --   --  54*  --  63*   < > 92*   AST (SGOT) U/L 57*  --   --  33*  --  31   < > 58*   GLUCOSE mg/dL 96  --   --  83  --  66   < > 109*   WBC 10*3/mm3  --  12.17*  --  13.67*  --  16.51*   < >  --    HEMOGLOBIN g/dL  --  12.1  --  11.7*  --  14.3   < >  --    PLATELETS 10*3/mm3  --  316  --   324  --  359   < >  --    PROBNP pg/mL  --   --   --  66.2  --   --   --   --    PROCALCITONIN ng/mL 0.15  --   --   --   --  0.07  --  0.04    < > = values in this interval not displayed.       Lab Results   Component Value Date    CALCIUM 9.3 11/05/2021    PHOS 3.6 11/05/2021           COVID LABS:  Results From Last 14 Days   Lab Units 11/05/21  0447 11/04/21  0551 11/03/21  0805 11/02/21  0606 11/01/21  0459 11/01/21  0458 10/31/21  0502 10/30/21  0836 10/30/21  0718 10/29/21  0622 10/29/21  0622 10/28/21  0611 10/28/21  0611 10/25/21  1347 10/23/21  1744   PROBNP pg/mL  --  66.2  --   --   --   --   --   --   --   --  576.6*  --   --   --  60.7   CRP mg/dL  --   --   --   --   --   --   --   --  <0.30  --  0.40  --  0.51*   < >  --    D DIMER QUANT MCGFEU/mL 0.88*  --  0.63*  --   --  0.40  --    < >  --   --   --   --  <0.27   < > 0.38   FERRITIN ng/mL 765.00* 772.00* 745.00*   < > 591.00*  --    < >   < > 639.00*   < > 782.00*   < > 592.00*   < >  --    PROCALCITONIN ng/mL 0.15  --  0.07  --  0.04  --   --    < > 0.05  --   --    < > 0.04   < >  --    TROPONIN T ng/mL  --   --   --   --   --   --   --   --   --   --   --   --   --   --  <0.010    < > = values in this interval not displayed.              Results Review:    I have reviewed the relevant laboratory results and independently reviewed the chest imaging from this hospitalization including the available echocardiogram reports personally and summarized it if/ when appropriate below    Assessment    Acute hypoxic respiratory failure; severe  Acute COVID-19 pneumonia- Unvaccinated patient  Elevated inflammatory markers  Mild hepatitis  Sciatica     RECOMMENDATIONS:  Patient is still with 3 L nasal cannula. Sats in the low 90s this a.m. inflammatory markers are still up today. CT angiogram ruled out pulmonary embolism.  Repeat chest x-ray showed some worsening but patient has clinically improved a little since yesterday.  Continue with steroids  Continue  with bronchodilators and Symbicort to help with home.  Does not want to be on duo nebs due to anxiety issues.  Back spasm/pain and anxiety playing a role in recovery  Continue trend inflammatory markers  OOB as tolerated     DVT prophylaxis     Discussed with .  Patient is looking little better and should be able to be discharged the next 24 to 48 h if she continues to make progress.  Some concern regarding social support at home and anxiety      She will need to follow-up with primary care in a week or 2 to evaluate for need of supplemental oxygen.    Caesar Guillen MD  11/5/2021

## 2021-11-05 NOTE — PROGRESS NOTES
[facility]ed[facility] to Hospital Care    [phone]   LOS: 11 days     Name: Claudia Price  Age/Sex: 46 y.o. female  :  1975        PCP: Malika Magana CRNP  Chief Complaint   Patient presents with   • Shortness of Breath   • Hip Pain   • Leg Pain      Subjective   She continues to be a little anxious and remains dyspneic with exertion.  O2 saturations also dropped with exertional activity.  Remains on 4 L of oxygen.  She denies any fevers or chills.  Denies any nausea vomiting.  She has had some constipation related to the opiates she has been taking for back pain.  General: No Fever or Chills, Cardiac: No Chest Pain or Palpitations, GI: No Nausea, Vomiting, or Diarrhea and Other: No bleeding    arformoterol, 15 mcg, Nebulization, BID - RT  budesonide-formoterol, 2 puff, Inhalation, BID - RT  cetirizine, 10 mg, Oral, Daily  dexamethasone, 6 mg, Oral, Daily With Breakfast  enoxaparin, 40 mg, Subcutaneous, Nightly  fluticasone, 1 spray, Nasal, Daily  gabapentin, 200 mg, Oral, Nightly  lidocaine, 1 patch, Transdermal, Q24H  nystatin, 5 mL, Oral, 4x Daily  pantoprazole, 40 mg, Oral, Q AM  polyethylene glycol, 17 g, Oral, Daily  senna-docusate sodium, 2 tablet, Oral, BID  sodium chloride, 2 spray, Each Nare, BID           Objective   Vital Signs  Temp:  [97.2 °F (36.2 °C)-98.7 °F (37.1 °C)] 98.7 °F (37.1 °C)  Heart Rate:  [] 75  Resp:  [18] 18  BP: (111-121)/(73-87) 112/76  Body mass index is 24.13 kg/m².    Intake/Output Summary (Last 24 hours) at 2021 0903  Last data filed at 2021 0652  Gross per 24 hour   Intake --   Output 2600 ml   Net -2600 ml       Physical Exam  Vitals and nursing note reviewed.   Constitutional:       Appearance: She is ill-appearing.   HENT:      Head: Normocephalic and atraumatic.   Cardiovascular:      Rate and Rhythm: Normal rate and regular rhythm.   Pulmonary:      Effort: Pulmonary effort is normal. No respiratory distress.      Breath sounds: Rales present.       Comments: Noted crackles in the right lower lobe  Abdominal:      General: Bowel sounds are normal. There is no distension.      Palpations: Abdomen is soft.   Skin:     General: Skin is warm.      Coloration: Skin is pale.   Neurological:      General: No focal deficit present.      Mental Status: She is alert and oriented to person, place, and time.   Psychiatric:      Comments: She is clearly depressed and avoided eye contact.  She has been anxious at times.  Her thought process is somewhat fatalistic at this point.           Results Review:       I reviewed the patient's new clinical results.  Results from last 7 days   Lab Units 11/05/21  0446 11/04/21  0551 11/03/21  0805 11/02/21  0606 10/30/21  0718   WBC 10*3/mm3 12.17* 13.67* 16.51* 13.95* 10.24   HEMOGLOBIN g/dL 12.1 11.7* 14.3 13.3 11.9*   PLATELETS 10*3/mm3 316 324 359 421 320     Results from last 7 days   Lab Units 11/05/21  0447 11/05/21  0035 11/04/21  0551 11/03/21  0805 11/02/21  0606 11/01/21  0459 10/31/21  0502 10/30/21  0718   SODIUM mmol/L 140  --  138 138 139 137  --  140   POTASSIUM mmol/L 4.1 4.1 4.3  4.3 3.4* 4.2 4.2  --  4.5   CHLORIDE mmol/L 101  --  104 99 102 101  --  103   CO2 mmol/L 26.9  --  24.3 26.1 25.7 24.4  --  24.1   BUN mg/dL 15  --  12 14 17 16  --  11   CREATININE mg/dL 0.54*  --  0.51* 0.63 0.61 0.66 0.60 0.48*   CALCIUM mg/dL 9.3  --  8.9 9.1 9.1 8.8  --  8.8   PHOSPHORUS mg/dL  --   --   --   --  4.2  --   --   --    Estimated Creatinine Clearance: 133.8 mL/min (A) (by C-G formula based on SCr of 0.54 mg/dL (L)).      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       PLAN  This is a 46-year-old female with history of mild uncomplicated asthma who presents to the hospital with shortness of breath and is found to have Covid pneumonia with hypoxic respiratory failure.    Covid 19 PNA with Acute Hypoxic Respiratory Failure:   -She has completed a 5-day course of remdesivir. She is on twice daily dexamethasone. Her inflammatory markers were increasing yesterday but are back down today.  She had negative CTA chest 5 days ago, given the worsening in her hypoxia I will repeat her chest x-ray today.  We will also add a BNP to her morning labs.  Was done a few days ago was elevated.  She does have some crackles in the right lower lobe on exam today.  -Negative pro-Guero, no antibiotics for now  -Continue supportive care. Wean oxygen as tolerated.       Chronic low back pain:   -Gabapentin 200 nightly started.  She has Norco as needed for pain.   -Continue lidocaine patch and Flexeril     Mucositis  -She wants to try nystatin swish and spit     Elevated LFTs  - mild and likely due to COVID.     Depression and anxiety  -She is dealing with pretty significant situational depression and anxiety on.  I do believe the social isolation is probably starting to get to the patient some.  -Could consider initiation of an antidepressant here in the hospital.  Hold off for now as the patient was not very enthusiastic about medications.  I have added as needed Xanax to her medications given her anxiety.  Have informed the patient that it is ordered if needed but she is not sure if she is ready to take this or not.    Factor V Leiden: monitor  Asthma: No bronchospasm currently.  Bradycardia: No heart block on ekg. continue to monitor  Constipation: Resolved.  Continue  bowel regimen.        · Lovenox 40 mg SC daily for DVT prophylaxis.  · Full code.  · Discussed with patient  · Anticipate discharge once her oxygen levels have stabilized, she still in quite a tenuous situation with fluctuating oxygen requirements and sitting right on the cusp of needing more than 4 L.  Potentially home over the weekend depending on her level of comfort with oxygen saturations and exercise tolerance      Disposition  To be determined      Montrell Rose MD  [facility]  11/05/21  09:03 EDT

## 2021-11-05 NOTE — PLAN OF CARE
Goal Outcome Evaluation:              Outcome Summary: very anxious and tearful this am. Hoping to be able to go home soon; O2 at 3LNC; desats to 87-88% with minimal exertion; up ad eve; using IS; Flexeril and Norco prn for back pain; emotional support provided

## 2021-11-05 NOTE — PLAN OF CARE
Goal Outcome Evaluation:  Plan of Care Reviewed With: patient        Progress: no change  Outcome Summary: Pt A&OX4, remains on 4L O2 per NC, increased HR and desats with activity, Pt reports anxiety but not agreeable to use of  prn xanax, diet reg vegetarian, prn pain meds and flexeril given per orders with good relief, slept prone, up ad eve, falls education reinforced

## 2021-11-06 LAB
BASOPHILS # BLD AUTO: 0.03 10*3/MM3 (ref 0–0.2)
BASOPHILS NFR BLD AUTO: 0.2 % (ref 0–1.5)
CREAT SERPL-MCNC: 0.52 MG/DL (ref 0.57–1)
DEPRECATED RDW RBC AUTO: 38.7 FL (ref 37–54)
EOSINOPHIL # BLD AUTO: 0.02 10*3/MM3 (ref 0–0.4)
EOSINOPHIL NFR BLD AUTO: 0.2 % (ref 0.3–6.2)
ERYTHROCYTE [DISTWIDTH] IN BLOOD BY AUTOMATED COUNT: 12.8 % (ref 12.3–15.4)
FERRITIN SERPL-MCNC: 593 NG/ML (ref 13–150)
GFR SERPL CREATININE-BSD FRML MDRD: 126 ML/MIN/1.73
HCT VFR BLD AUTO: 35.2 % (ref 34–46.6)
HGB BLD-MCNC: 11.3 G/DL (ref 12–15.9)
IMM GRANULOCYTES # BLD AUTO: 0.14 10*3/MM3 (ref 0–0.05)
IMM GRANULOCYTES NFR BLD AUTO: 1.1 % (ref 0–0.5)
LYMPHOCYTES # BLD AUTO: 0.83 10*3/MM3 (ref 0.7–3.1)
LYMPHOCYTES NFR BLD AUTO: 6.4 % (ref 19.6–45.3)
MCH RBC QN AUTO: 27.1 PG (ref 26.6–33)
MCHC RBC AUTO-ENTMCNC: 32.1 G/DL (ref 31.5–35.7)
MCV RBC AUTO: 84.4 FL (ref 79–97)
MONOCYTES # BLD AUTO: 0.91 10*3/MM3 (ref 0.1–0.9)
MONOCYTES NFR BLD AUTO: 7 % (ref 5–12)
NEUTROPHILS NFR BLD AUTO: 10.99 10*3/MM3 (ref 1.7–7)
NEUTROPHILS NFR BLD AUTO: 85.1 % (ref 42.7–76)
NRBC BLD AUTO-RTO: 0 /100 WBC (ref 0–0.2)
PLATELET # BLD AUTO: 326 10*3/MM3 (ref 140–450)
PMV BLD AUTO: 9.7 FL (ref 6–12)
RBC # BLD AUTO: 4.17 10*6/MM3 (ref 3.77–5.28)
WBC # BLD AUTO: 12.92 10*3/MM3 (ref 3.4–10.8)

## 2021-11-06 PROCEDURE — 82728 ASSAY OF FERRITIN: CPT | Performed by: INTERNAL MEDICINE

## 2021-11-06 PROCEDURE — 94799 UNLISTED PULMONARY SVC/PX: CPT

## 2021-11-06 PROCEDURE — 85025 COMPLETE CBC W/AUTO DIFF WBC: CPT | Performed by: STUDENT IN AN ORGANIZED HEALTH CARE EDUCATION/TRAINING PROGRAM

## 2021-11-06 PROCEDURE — 82565 ASSAY OF CREATININE: CPT | Performed by: INTERNAL MEDICINE

## 2021-11-06 PROCEDURE — 25010000002 ENOXAPARIN PER 10 MG: Performed by: INTERNAL MEDICINE

## 2021-11-06 PROCEDURE — 63710000001 DEXAMETHASONE PER 0.25 MG: Performed by: INTERNAL MEDICINE

## 2021-11-06 RX ADMIN — GABAPENTIN 200 MG: 100 CAPSULE ORAL at 20:16

## 2021-11-06 RX ADMIN — NYSTATIN 500000 UNITS: 100000 SUSPENSION ORAL at 09:18

## 2021-11-06 RX ADMIN — DOCUSATE SODIUM 50MG AND SENNOSIDES 8.6MG 2 TABLET: 8.6; 5 TABLET, FILM COATED ORAL at 09:18

## 2021-11-06 RX ADMIN — FLUTICASONE PROPIONATE 1 SPRAY: 50 SPRAY, METERED NASAL at 09:19

## 2021-11-06 RX ADMIN — SALINE NASAL SPRAY 2 SPRAY: 1.5 SOLUTION NASAL at 09:19

## 2021-11-06 RX ADMIN — CYCLOBENZAPRINE 10 MG: 10 TABLET, FILM COATED ORAL at 02:02

## 2021-11-06 RX ADMIN — CYCLOBENZAPRINE 10 MG: 10 TABLET, FILM COATED ORAL at 18:16

## 2021-11-06 RX ADMIN — POLYETHYLENE GLYCOL 3350 17 G: 17 POWDER, FOR SOLUTION ORAL at 09:20

## 2021-11-06 RX ADMIN — PANTOPRAZOLE SODIUM 40 MG: 40 TABLET, DELAYED RELEASE ORAL at 06:16

## 2021-11-06 RX ADMIN — ENOXAPARIN SODIUM 40 MG: 40 INJECTION SUBCUTANEOUS at 20:17

## 2021-11-06 RX ADMIN — ALPRAZOLAM 0.5 MG: 0.5 TABLET ORAL at 10:17

## 2021-11-06 RX ADMIN — NYSTATIN 500000 UNITS: 100000 SUSPENSION ORAL at 18:16

## 2021-11-06 RX ADMIN — NYSTATIN 500000 UNITS: 100000 SUSPENSION ORAL at 20:16

## 2021-11-06 RX ADMIN — CETIRIZINE HYDROCHLORIDE 10 MG: 10 TABLET ORAL at 09:18

## 2021-11-06 RX ADMIN — LIDOCAINE 1 PATCH: 50 PATCH CUTANEOUS at 09:19

## 2021-11-06 RX ADMIN — DEXAMETHASONE 6 MG: 4 TABLET ORAL at 09:18

## 2021-11-06 RX ADMIN — DOCUSATE SODIUM 50MG AND SENNOSIDES 8.6MG 2 TABLET: 8.6; 5 TABLET, FILM COATED ORAL at 20:16

## 2021-11-06 RX ADMIN — SALINE NASAL SPRAY 2 SPRAY: 1.5 SOLUTION NASAL at 20:17

## 2021-11-06 RX ADMIN — CYCLOBENZAPRINE 10 MG: 10 TABLET, FILM COATED ORAL at 10:17

## 2021-11-06 RX ADMIN — BUDESONIDE AND FORMOTEROL FUMARATE DIHYDRATE 2 PUFF: 160; 4.5 AEROSOL RESPIRATORY (INHALATION) at 19:19

## 2021-11-06 RX ADMIN — ALPRAZOLAM 0.5 MG: 0.5 TABLET ORAL at 18:16

## 2021-11-06 RX ADMIN — ARFORMOTEROL TARTRATE 15 MCG: 15 SOLUTION RESPIRATORY (INHALATION) at 07:22

## 2021-11-06 RX ADMIN — NYSTATIN 500000 UNITS: 100000 SUSPENSION ORAL at 13:17

## 2021-11-06 RX ADMIN — BUDESONIDE AND FORMOTEROL FUMARATE DIHYDRATE 2 PUFF: 160; 4.5 AEROSOL RESPIRATORY (INHALATION) at 09:15

## 2021-11-06 RX ADMIN — HYDROCODONE BITARTRATE AND ACETAMINOPHEN 1 TABLET: 5; 325 TABLET ORAL at 20:16

## 2021-11-06 RX ADMIN — ALPRAZOLAM 0.5 MG: 0.5 TABLET ORAL at 02:02

## 2021-11-06 NOTE — PLAN OF CARE
"  Problem: Adult Inpatient Plan of Care  Goal: Plan of Care Review  Outcome: Ongoing, Progressing  Flowsheets (Taken 11/6/2021 1716)  Progress: improving  Plan of Care Reviewed With: patient  Outcome Summary: pt. is A&Ox4 with VSS on 2L NC. pt. does c/o pain and anxiety this shift with PRN medications given with good results. pt. reports feeling stonger, and did c/o her \"heart racing\". This was discussed with the attending and medication adjustments were made. Pt. up ad vee. Will continue to monitor.  Goal: Patient-Specific Goal (Individualized)  Outcome: Ongoing, Progressing  Goal: Absence of Hospital-Acquired Illness or Injury  Outcome: Ongoing, Progressing  Intervention: Identify and Manage Fall Risk  Recent Flowsheet Documentation  Taken 11/6/2021 1600 by Nicko Mahmood, RN  Safety Promotion/Fall Prevention:   assistive device/personal items within reach   clutter free environment maintained   fall prevention program maintained   lighting adjusted   mobility aid in reach   nonskid shoes/slippers when out of bed   room organization consistent   safety round/check completed  Taken 11/6/2021 1400 by Nicko Mahmood, RN  Safety Promotion/Fall Prevention:   assistive device/personal items within reach   clutter free environment maintained   fall prevention program maintained   lighting adjusted   mobility aid in reach   nonskid shoes/slippers when out of bed   room organization consistent   safety round/check completed  Taken 11/6/2021 1200 by Nicko Mahmood, RN  Safety Promotion/Fall Prevention:   assistive device/personal items within reach   clutter free environment maintained   fall prevention program maintained   lighting adjusted   mobility aid in reach   nonskid shoes/slippers when out of bed   room organization consistent   safety round/check completed  Taken 11/6/2021 1000 by Nicko Mahmood, RN  Safety Promotion/Fall Prevention:   assistive device/personal items within reach   clutter free environment " maintained   fall prevention program maintained   lighting adjusted   mobility aid in reach   nonskid shoes/slippers when out of bed   safety round/check completed   room organization consistent  Taken 11/6/2021 0800 by Nicko Mahmood RN  Safety Promotion/Fall Prevention:   assistive device/personal items within reach   clutter free environment maintained   fall prevention program maintained   lighting adjusted   mobility aid in reach   nonskid shoes/slippers when out of bed   room organization consistent   safety round/check completed  Intervention: Prevent Infection  Recent Flowsheet Documentation  Taken 11/6/2021 1600 by Nicko Mahmood RN  Infection Prevention:   environmental surveillance performed   hand hygiene promoted   personal protective equipment utilized   rest/sleep promoted   single patient room provided   visitors restricted/screened  Taken 11/6/2021 1400 by Nicko Mahmood RN  Infection Prevention:   environmental surveillance performed   personal protective equipment utilized   hand hygiene promoted   rest/sleep promoted   single patient room provided   visitors restricted/screened  Taken 11/6/2021 1200 by Nicko Mahmood RN  Infection Prevention:   environmental surveillance performed   hand hygiene promoted   personal protective equipment utilized   rest/sleep promoted   single patient room provided   visitors restricted/screened  Taken 11/6/2021 1000 by Nicko Mahmood RN  Infection Prevention:   environmental surveillance performed   hand hygiene promoted   personal protective equipment utilized   rest/sleep promoted   single patient room provided   visitors restricted/screened  Taken 11/6/2021 0800 by Nicko Mahmood, RN  Infection Prevention:   environmental surveillance performed   hand hygiene promoted   personal protective equipment utilized   rest/sleep promoted   single patient room provided   visitors restricted/screened  Goal: Optimal Comfort and Wellbeing  Outcome: Ongoing,  "Progressing  Goal: Readiness for Transition of Care  Outcome: Ongoing, Progressing     Problem: Pain Chronic (Persistent)  Goal: Acceptable Pain Control and Functional Ability  Outcome: Ongoing, Progressing  Intervention: Develop Pain Management Plan  Recent Flowsheet Documentation  Taken 11/6/2021 1000 by Nicko Mahmood RN  Pain Management Interventions: see MAR  Intervention: Manage Persistent Pain  Recent Flowsheet Documentation  Taken 11/6/2021 1600 by Nicko Mahmood RN  Medication Review/Management: medications reviewed  Taken 11/6/2021 1400 by Nicko Mahmood RN  Medication Review/Management: medications reviewed  Taken 11/6/2021 1200 by Nicko Mahmood RN  Medication Review/Management: medications reviewed  Taken 11/6/2021 1000 by Nicko Mahmood RN  Medication Review/Management: medications reviewed  Taken 11/6/2021 0800 by Nicko Mahmood RN  Medication Review/Management: medications reviewed     Problem: Breathing Pattern Ineffective  Goal: Effective Breathing Pattern  Outcome: Ongoing, Progressing   Goal Outcome Evaluation:  Plan of Care Reviewed With: patient        Progress: improving  Outcome Summary: pt. is A&Ox4 with VSS on 2L NC. pt. does c/o pain and anxiety this shift with PRN medications given with good results. pt. reports feeling stonger, and did c/o her \"heart racing\". This was discussed with the attending and medication adjustments were made. Pt. up ad eve. Will continue to monitor.  "

## 2021-11-06 NOTE — PROGRESS NOTES
Mack Maria MD                          [phone]      Patient ID:    Name:  Claudia Price    MRN:  [id number]    1975   46 y.o.  female            Patient Care Team:  Malika Magana CRNP as PCP - General (Family Medicine)    CC/ Reason for visit: Acute respiratory failure, acute COVID-19 pneumonia, acute back pain with sciatica    Subjective:   NAEON, improving oxygenation. Denies F/C, CP, sputum production.   Nervous and some anxiety. Still feels very [place] though.     ROS: Denies any subjective fevers, syncope or presyncopal events, new neurological deficits, nausea or vomiting currently    Objective     Vital Signs past 24hrs    BP range: BP: (109-122)/(72-86) 111/72  Pulse range: Heart Rate:  [] 94  Resp rate range: Resp:  [18-20] 20  Temp range: Temp (24hrs), Av.5 °F (36.4 °C), Min:96.5 °F (35.8 °C), Max:98.1 °F (36.7 °C)      Ventilator/Non-Invasive Ventilation Settings (From admission, onward)            None          Device (Oxygen Therapy): nasal cannula       65.8 kg (145 lb); Body mass index is 24.13 kg/m².      Intake/Output Summary (Last 24 hours) at 2021 1602  Last data filed at 2021 1357  Gross per 24 hour   Intake 600 ml   Output 1200 ml   Net -600 ml       PHYSICAL EXAM   Physical Exam  Vitals and nursing note reviewed.   Constitutional:       General: She is not in acute distress.     Appearance: Normal appearance.   HENT:      Head: Normocephalic and atraumatic.      Nose: Nose normal. No congestion.      Mouth/Throat:      Mouth: Mucous membranes are moist.      Pharynx: Oropharynx is clear.   Eyes:      General: No scleral icterus.     Extraocular Movements: Extraocular movements intact.   Cardiovascular:      Rate and Rhythm: Normal rate and regular rhythm.      Pulses: Normal pulses.   Pulmonary:      Effort: Pulmonary effort is normal.      Breath sounds: Normal breath sounds.   Abdominal:      General: Abdomen is flat. There  is no distension.      Palpations: Abdomen is soft.      Tenderness: There is no abdominal tenderness.   Musculoskeletal:         General: Normal range of motion.      Cervical back: Normal range of motion and neck supple.      Right lower leg: No edema.      Left lower leg: No edema.   Skin:     General: Skin is warm and dry.   Neurological:      General: No focal deficit present.      Mental Status: She is alert and oriented to person, place, and time.         PPE recommended per [facility] infectious disease Isolation protocol for the current clinical scenario(as mentioned below) was followed.     Scheduled meds:  budesonide-formoterol, 2 puff, Inhalation, BID - RT  cetirizine, 10 mg, Oral, Daily  dexamethasone, 6 mg, Oral, Daily With Breakfast  enoxaparin, 40 mg, Subcutaneous, Nightly  fluticasone, 1 spray, Nasal, Daily  gabapentin, 200 mg, Oral, Nightly  lidocaine, 1 patch, Transdermal, Q24H  nystatin, 5 mL, Oral, 4x Daily  pantoprazole, 40 mg, Oral, Q AM  polyethylene glycol, 17 g, Oral, Daily  senna-docusate sodium, 2 tablet, Oral, BID  sodium chloride, 2 spray, Each Nare, BID        IV meds:                           Data Review:      Results from last 7 days   Lab Units 11/06/21  0438 11/05/21  0447 11/05/21  0446 11/05/21  0035 11/04/21  0551 11/03/21  0805 11/03/21  0805 11/02/21  0606 11/01/21  0459   SODIUM mmol/L  --  140  --   --  138  --  138   < > 137   POTASSIUM mmol/L  --  4.1  --  4.1 4.3  4.3   < > 3.4*   < > 4.2   CHLORIDE mmol/L  --  101  --   --  104  --  99   < > 101   CO2 mmol/L  --  26.9  --   --  24.3  --  26.1   < > 24.4   BUN mg/dL  --  15  --   --  12  --  14   < > 16   CREATININE mg/dL 0.52* 0.54*  --   --  0.51*   < > 0.63   < > 0.66   CALCIUM mg/dL  --  9.3  --   --  8.9  --  9.1   < > 8.8   BILIRUBIN mg/dL  --  0.2  --   --  0.3  --  0.9   < > 0.4   ALK PHOS U/L  --  84  --   --  76  --  85   < > 73   ALT (SGPT) U/L  --  110*  --   --  54*  --  63*   < > 92*   AST (SGOT) U/L   --  57*  --   --  33*  --  31   < > 58*   GLUCOSE mg/dL  --  96  --   --  83  --  66   < > 109*   WBC 10*3/mm3 12.92*  --  12.17*  --  13.67*   < > 16.51*   < >  --    HEMOGLOBIN g/dL 11.3*  --  12.1  --  11.7*   < > 14.3   < >  --    PLATELETS 10*3/mm3 326  --  316  --  324   < > 359   < >  --    PROBNP pg/mL  --   --   --   --  66.2  --   --   --   --    PROCALCITONIN ng/mL  --  0.15  --   --   --   --  0.07  --  0.04    < > = values in this interval not displayed.       Lab Results   Component Value Date    CALCIUM 9.3 11/05/2021    PHOS 3.6 11/05/2021           COVID LABS:  Results From Last 14 Days   Lab Units 11/06/21  0438 11/05/21  0447 11/04/21  0551 11/03/21  0805 11/03/21  0805 11/02/21  0606 11/01/21  0459 11/01/21  0458 10/31/21  0502 10/30/21  0836 10/30/21  0718 10/29/21  0622 10/29/21  0622 10/28/21  0611 10/28/21  0611 10/25/21  1347 10/23/21  1744   PROBNP pg/mL  --   --  66.2  --   --   --   --   --   --   --   --   --  576.6*  --   --   --  60.7   CRP mg/dL  --   --   --   --   --   --   --   --   --   --  <0.30  --  0.40  --  0.51*   < >  --    D DIMER QUANT MCGFEU/mL  --  0.88*  --   --  0.63*  --   --  0.40  --    < >  --   --   --   --  <0.27   < > 0.38   FERRITIN ng/mL 593.00* 765.00* 772.00*   < > 745.00*   < > 591.00*  --    < >   < > 639.00*   < > 782.00*   < > 592.00*   < >  --    PROCALCITONIN ng/mL  --  0.15  --   --  0.07  --  0.04  --   --    < > 0.05  --   --    < > 0.04   < >  --    TROPONIN T ng/mL  --   --   --   --   --   --   --   --   --   --   --   --   --   --   --   --  <0.010    < > = values in this interval not displayed.              Results Review:    I have reviewed the relevant laboratory results and independently reviewed the chest imaging from this hospitalization including the available echocardiogram reports personally and summarized it if/ when appropriate below    Assessment    # Acute hypoxic respiratory failure; severe, currently on 2L NC   - CTA neg for  PE  # Acute COVID-19 pneumonia- Unvaccinated patient  # Elevated inflammatory markers  # Mild hepatitis  # Mild h/o Asthma        RECOMMENDATIONS:  - Continue with dex 6mg, ok to stop after the 5 doses  - ok to cont symbicort, would discharge her with it, as does have some history of asthma  - self prone and OOB as much as possible encourage IS   - cont DVT prophylaxis    Ok for D/c on oxygen per pulmonary perspective. Will follow peripherally.        Mack Maria MD  11/6/2021

## 2021-11-07 ENCOUNTER — APPOINTMENT (OUTPATIENT)
Dept: CARDIOLOGY | Facility: HOSPITAL | Age: 46
End: 2021-11-07

## 2021-11-07 LAB
BH CV LOWER VASCULAR LEFT COMMON FEMORAL AUGMENT: NORMAL
BH CV LOWER VASCULAR LEFT COMMON FEMORAL COMPETENT: NORMAL
BH CV LOWER VASCULAR LEFT COMMON FEMORAL COMPRESS: NORMAL
BH CV LOWER VASCULAR LEFT COMMON FEMORAL PHASIC: NORMAL
BH CV LOWER VASCULAR LEFT COMMON FEMORAL SPONT: NORMAL
BH CV LOWER VASCULAR LEFT DISTAL FEMORAL COMPRESS: NORMAL
BH CV LOWER VASCULAR LEFT GASTRONEMIUS COMPRESS: NORMAL
BH CV LOWER VASCULAR LEFT GREATER SAPH AK COMPRESS: NORMAL
BH CV LOWER VASCULAR LEFT GREATER SAPH BK COMPRESS: NORMAL
BH CV LOWER VASCULAR LEFT LESSER SAPH COMPRESS: NORMAL
BH CV LOWER VASCULAR LEFT MID FEMORAL AUGMENT: NORMAL
BH CV LOWER VASCULAR LEFT MID FEMORAL COMPETENT: NORMAL
BH CV LOWER VASCULAR LEFT MID FEMORAL COMPRESS: NORMAL
BH CV LOWER VASCULAR LEFT MID FEMORAL PHASIC: NORMAL
BH CV LOWER VASCULAR LEFT MID FEMORAL SPONT: NORMAL
BH CV LOWER VASCULAR LEFT PERONEAL COMPRESS: NORMAL
BH CV LOWER VASCULAR LEFT POPLITEAL AUGMENT: NORMAL
BH CV LOWER VASCULAR LEFT POPLITEAL COMPETENT: NORMAL
BH CV LOWER VASCULAR LEFT POPLITEAL COMPRESS: NORMAL
BH CV LOWER VASCULAR LEFT POPLITEAL PHASIC: NORMAL
BH CV LOWER VASCULAR LEFT POPLITEAL SPONT: NORMAL
BH CV LOWER VASCULAR LEFT POSTERIOR TIBIAL COMPRESS: NORMAL
BH CV LOWER VASCULAR LEFT PROFUNDA FEMORAL COMPRESS: NORMAL
BH CV LOWER VASCULAR LEFT PROXIMAL FEMORAL COMPRESS: NORMAL
BH CV LOWER VASCULAR LEFT SAPHENOFEMORAL JUNCTION COMPRESS: NORMAL
BH CV LOWER VASCULAR RIGHT COMMON FEMORAL AUGMENT: NORMAL
BH CV LOWER VASCULAR RIGHT COMMON FEMORAL COMPETENT: NORMAL
BH CV LOWER VASCULAR RIGHT COMMON FEMORAL COMPRESS: NORMAL
BH CV LOWER VASCULAR RIGHT COMMON FEMORAL PHASIC: NORMAL
BH CV LOWER VASCULAR RIGHT COMMON FEMORAL SPONT: NORMAL
BH CV LOWER VASCULAR RIGHT DISTAL FEMORAL COMPRESS: NORMAL
BH CV LOWER VASCULAR RIGHT GASTRONEMIUS COMPRESS: NORMAL
BH CV LOWER VASCULAR RIGHT GREATER SAPH AK COMPRESS: NORMAL
BH CV LOWER VASCULAR RIGHT GREATER SAPH BK COMPRESS: NORMAL
BH CV LOWER VASCULAR RIGHT LESSER SAPH COMPRESS: NORMAL
BH CV LOWER VASCULAR RIGHT MID FEMORAL AUGMENT: NORMAL
BH CV LOWER VASCULAR RIGHT MID FEMORAL COMPETENT: NORMAL
BH CV LOWER VASCULAR RIGHT MID FEMORAL COMPRESS: NORMAL
BH CV LOWER VASCULAR RIGHT MID FEMORAL PHASIC: NORMAL
BH CV LOWER VASCULAR RIGHT MID FEMORAL SPONT: NORMAL
BH CV LOWER VASCULAR RIGHT PERONEAL COMPRESS: NORMAL
BH CV LOWER VASCULAR RIGHT POPLITEAL AUGMENT: NORMAL
BH CV LOWER VASCULAR RIGHT POPLITEAL COMPETENT: NORMAL
BH CV LOWER VASCULAR RIGHT POPLITEAL COMPRESS: NORMAL
BH CV LOWER VASCULAR RIGHT POPLITEAL PHASIC: NORMAL
BH CV LOWER VASCULAR RIGHT POPLITEAL SPONT: NORMAL
BH CV LOWER VASCULAR RIGHT POSTERIOR TIBIAL COMPRESS: NORMAL
BH CV LOWER VASCULAR RIGHT PROFUNDA FEMORAL COMPRESS: NORMAL
BH CV LOWER VASCULAR RIGHT PROXIMAL FEMORAL COMPRESS: NORMAL
BH CV LOWER VASCULAR RIGHT SAPHENOFEMORAL JUNCTION COMPRESS: NORMAL
BH CV UPPER VENOUS LEFT AXILLARY AUGMENT: NORMAL
BH CV UPPER VENOUS LEFT AXILLARY COMPETENT: NORMAL
BH CV UPPER VENOUS LEFT AXILLARY COMPRESS: NORMAL
BH CV UPPER VENOUS LEFT AXILLARY PHASIC: NORMAL
BH CV UPPER VENOUS LEFT AXILLARY SPONT: NORMAL
BH CV UPPER VENOUS LEFT BASILIC FOREARM COMPRESS: NORMAL
BH CV UPPER VENOUS LEFT BASILIC UPPER COLOR: 1
BH CV UPPER VENOUS LEFT BASILIC UPPER COMPRESS: NORMAL
BH CV UPPER VENOUS LEFT BASILIC UPPER THROMBUS: NORMAL
BH CV UPPER VENOUS LEFT BRACHIAL COMPRESS: NORMAL
BH CV UPPER VENOUS LEFT CEPHALIC FOREARM COMPRESS: NORMAL
BH CV UPPER VENOUS LEFT CEPHALIC UPPER COMPRESS: NORMAL
BH CV UPPER VENOUS LEFT INTERNAL JUGULAR AUGMENT: NORMAL
BH CV UPPER VENOUS LEFT INTERNAL JUGULAR COMPETENT: NORMAL
BH CV UPPER VENOUS LEFT INTERNAL JUGULAR COMPRESS: NORMAL
BH CV UPPER VENOUS LEFT INTERNAL JUGULAR PHASIC: NORMAL
BH CV UPPER VENOUS LEFT INTERNAL JUGULAR SPONT: NORMAL
BH CV UPPER VENOUS LEFT RADIAL COMPRESS: NORMAL
BH CV UPPER VENOUS LEFT SUBCLAVIAN AUGMENT: NORMAL
BH CV UPPER VENOUS LEFT SUBCLAVIAN COMPETENT: NORMAL
BH CV UPPER VENOUS LEFT SUBCLAVIAN COMPRESS: NORMAL
BH CV UPPER VENOUS LEFT SUBCLAVIAN PHASIC: NORMAL
BH CV UPPER VENOUS LEFT SUBCLAVIAN SPONT: NORMAL
BH CV UPPER VENOUS LEFT ULNAR COMPRESS: NORMAL
BH CV UPPER VENOUS RIGHT AXILLARY AUGMENT: NORMAL
BH CV UPPER VENOUS RIGHT AXILLARY COMPETENT: NORMAL
BH CV UPPER VENOUS RIGHT AXILLARY COMPRESS: NORMAL
BH CV UPPER VENOUS RIGHT AXILLARY PHASIC: NORMAL
BH CV UPPER VENOUS RIGHT AXILLARY SPONT: NORMAL
BH CV UPPER VENOUS RIGHT BASILIC FOREARM COMPRESS: NORMAL
BH CV UPPER VENOUS RIGHT BASILIC UPPER COMPRESS: NORMAL
BH CV UPPER VENOUS RIGHT BRACHIAL COMPRESS: NORMAL
BH CV UPPER VENOUS RIGHT CEPHALIC FOREARM COMPRESS: NORMAL
BH CV UPPER VENOUS RIGHT CEPHALIC UPPER COMPRESS: NORMAL
BH CV UPPER VENOUS RIGHT INTERNAL JUGULAR AUGMENT: NORMAL
BH CV UPPER VENOUS RIGHT INTERNAL JUGULAR COMPETENT: NORMAL
BH CV UPPER VENOUS RIGHT INTERNAL JUGULAR COMPRESS: NORMAL
BH CV UPPER VENOUS RIGHT INTERNAL JUGULAR PHASIC: NORMAL
BH CV UPPER VENOUS RIGHT INTERNAL JUGULAR SPONT: NORMAL
BH CV UPPER VENOUS RIGHT RADIAL COMPRESS: NORMAL
BH CV UPPER VENOUS RIGHT SUBCLAVIAN AUGMENT: NORMAL
BH CV UPPER VENOUS RIGHT SUBCLAVIAN COMPETENT: NORMAL
BH CV UPPER VENOUS RIGHT SUBCLAVIAN COMPRESS: NORMAL
BH CV UPPER VENOUS RIGHT SUBCLAVIAN PHASIC: NORMAL
BH CV UPPER VENOUS RIGHT SUBCLAVIAN SPONT: NORMAL
BH CV UPPER VENOUS RIGHT ULNAR COMPRESS: NORMAL
CREAT SERPL-MCNC: 0.34 MG/DL (ref 0.57–1)
CRP SERPL-MCNC: 2.43 MG/DL (ref 0–0.5)
D DIMER PPP FEU-MCNC: 2.16 MCGFEU/ML (ref 0–0.49)
FERRITIN SERPL-MCNC: 584 NG/ML (ref 13–150)
GFR SERPL CREATININE-BSD FRML MDRD: >150 ML/MIN/1.73
MAXIMAL PREDICTED HEART RATE: 173 BPM
MAXIMAL PREDICTED HEART RATE: 173 BPM
PROCALCITONIN SERPL-MCNC: 0.05 NG/ML (ref 0–0.25)
QT INTERVAL: 343 MS
STRESS TARGET HR: 147 BPM
STRESS TARGET HR: 147 BPM
TROPONIN T SERPL-MCNC: <0.01 NG/ML (ref 0–0.03)

## 2021-11-07 PROCEDURE — 84145 PROCALCITONIN (PCT): CPT | Performed by: INTERNAL MEDICINE

## 2021-11-07 PROCEDURE — 63710000001 DEXAMETHASONE PER 0.25 MG: Performed by: INTERNAL MEDICINE

## 2021-11-07 PROCEDURE — 25010000002 ENOXAPARIN PER 10 MG: Performed by: INTERNAL MEDICINE

## 2021-11-07 PROCEDURE — 93010 ELECTROCARDIOGRAM REPORT: CPT | Performed by: INTERNAL MEDICINE

## 2021-11-07 PROCEDURE — 85379 FIBRIN DEGRADATION QUANT: CPT | Performed by: HOSPITALIST

## 2021-11-07 PROCEDURE — 94799 UNLISTED PULMONARY SVC/PX: CPT

## 2021-11-07 PROCEDURE — 82565 ASSAY OF CREATININE: CPT | Performed by: INTERNAL MEDICINE

## 2021-11-07 PROCEDURE — 93970 EXTREMITY STUDY: CPT

## 2021-11-07 PROCEDURE — 84484 ASSAY OF TROPONIN QUANT: CPT | Performed by: HOSPITALIST

## 2021-11-07 PROCEDURE — 82728 ASSAY OF FERRITIN: CPT | Performed by: INTERNAL MEDICINE

## 2021-11-07 PROCEDURE — 86140 C-REACTIVE PROTEIN: CPT | Performed by: HOSPITALIST

## 2021-11-07 PROCEDURE — 36415 COLL VENOUS BLD VENIPUNCTURE: CPT | Performed by: HOSPITALIST

## 2021-11-07 PROCEDURE — 93005 ELECTROCARDIOGRAM TRACING: CPT | Performed by: HOSPITALIST

## 2021-11-07 RX ADMIN — DEXAMETHASONE 6 MG: 4 TABLET ORAL at 09:06

## 2021-11-07 RX ADMIN — ALPRAZOLAM 0.5 MG: 0.5 TABLET ORAL at 18:04

## 2021-11-07 RX ADMIN — CYCLOBENZAPRINE 10 MG: 10 TABLET, FILM COATED ORAL at 18:04

## 2021-11-07 RX ADMIN — FLUTICASONE PROPIONATE 1 SPRAY: 50 SPRAY, METERED NASAL at 09:07

## 2021-11-07 RX ADMIN — HYDROCODONE BITARTRATE AND ACETAMINOPHEN 1 TABLET: 5; 325 TABLET ORAL at 05:57

## 2021-11-07 RX ADMIN — CYCLOBENZAPRINE 10 MG: 10 TABLET, FILM COATED ORAL at 10:24

## 2021-11-07 RX ADMIN — ENOXAPARIN SODIUM 40 MG: 40 INJECTION SUBCUTANEOUS at 20:43

## 2021-11-07 RX ADMIN — POLYETHYLENE GLYCOL 3350 17 G: 17 POWDER, FOR SOLUTION ORAL at 09:06

## 2021-11-07 RX ADMIN — CYCLOBENZAPRINE 10 MG: 10 TABLET, FILM COATED ORAL at 02:10

## 2021-11-07 RX ADMIN — BUDESONIDE AND FORMOTEROL FUMARATE DIHYDRATE 2 PUFF: 160; 4.5 AEROSOL RESPIRATORY (INHALATION) at 21:32

## 2021-11-07 RX ADMIN — NYSTATIN 500000 UNITS: 100000 SUSPENSION ORAL at 09:06

## 2021-11-07 RX ADMIN — NYSTATIN 500000 UNITS: 100000 SUSPENSION ORAL at 20:43

## 2021-11-07 RX ADMIN — ALPRAZOLAM 0.5 MG: 0.5 TABLET ORAL at 02:10

## 2021-11-07 RX ADMIN — BUDESONIDE AND FORMOTEROL FUMARATE DIHYDRATE 2 PUFF: 160; 4.5 AEROSOL RESPIRATORY (INHALATION) at 07:27

## 2021-11-07 RX ADMIN — SALINE NASAL SPRAY 2 SPRAY: 1.5 SOLUTION NASAL at 20:44

## 2021-11-07 RX ADMIN — GABAPENTIN 200 MG: 100 CAPSULE ORAL at 20:42

## 2021-11-07 RX ADMIN — NYSTATIN 500000 UNITS: 100000 SUSPENSION ORAL at 12:21

## 2021-11-07 RX ADMIN — SALINE NASAL SPRAY 2 SPRAY: 1.5 SOLUTION NASAL at 09:07

## 2021-11-07 RX ADMIN — PANTOPRAZOLE SODIUM 40 MG: 40 TABLET, DELAYED RELEASE ORAL at 05:57

## 2021-11-07 RX ADMIN — ALPRAZOLAM 0.5 MG: 0.5 TABLET ORAL at 10:24

## 2021-11-07 RX ADMIN — CETIRIZINE HYDROCHLORIDE 10 MG: 10 TABLET ORAL at 09:06

## 2021-11-07 RX ADMIN — DOCUSATE SODIUM 50MG AND SENNOSIDES 8.6MG 2 TABLET: 8.6; 5 TABLET, FILM COATED ORAL at 09:06

## 2021-11-07 RX ADMIN — HYDROCODONE BITARTRATE AND ACETAMINOPHEN 1 TABLET: 5; 325 TABLET ORAL at 20:42

## 2021-11-07 RX ADMIN — LIDOCAINE 1 PATCH: 50 PATCH CUTANEOUS at 09:07

## 2021-11-07 NOTE — PLAN OF CARE
Problem: Adult Inpatient Plan of Care  Goal: Plan of Care Review  Outcome: Ongoing, Progressing  Flowsheets (Taken 11/7/2021 1616)  Progress: improving  Plan of Care Reviewed With: patient  Outcome Summary: pt. remains A&Ox4 with VSS on monitor and on 2L NC. pt. is reporting feeling better. pt. had imaging performed to rule out DVT's in upper and lower extremities which was indicated by morning labs. see results in chart. pt. pain and anxiety controlled with PRN medications with good outcomes. pt. cont. to be up ad eve. Will continue to monitor.  Goal: Patient-Specific Goal (Individualized)  Outcome: Ongoing, Progressing  Goal: Absence of Hospital-Acquired Illness or Injury  Outcome: Ongoing, Progressing  Intervention: Identify and Manage Fall Risk  Recent Flowsheet Documentation  Taken 11/7/2021 1600 by Nicko Mahmood, RAQUEL  Safety Promotion/Fall Prevention:   assistive device/personal items within reach   clutter free environment maintained   fall prevention program maintained   mobility aid in reach   lighting adjusted   nonskid shoes/slippers when out of bed   room organization consistent   safety round/check completed  Taken 11/7/2021 1400 by Nicko Mahmood, RN  Safety Promotion/Fall Prevention:   assistive device/personal items within reach   clutter free environment maintained   fall prevention program maintained   lighting adjusted   mobility aid in reach   nonskid shoes/slippers when out of bed   room organization consistent   safety round/check completed  Taken 11/7/2021 1200 by Nicko Mahmood, RN  Safety Promotion/Fall Prevention:   assistive device/personal items within reach   clutter free environment maintained   fall prevention program maintained   lighting adjusted   mobility aid in reach   nonskid shoes/slippers when out of bed   room organization consistent   safety round/check completed  Taken 11/7/2021 1000 by Nicok Mahmood, RN  Safety Promotion/Fall Prevention:   assistive device/personal items  within reach   clutter free environment maintained   fall prevention program maintained   lighting adjusted   mobility aid in reach   nonskid shoes/slippers when out of bed   room organization consistent   safety round/check completed  Taken 11/7/2021 0800 by Nicko Mahmood RN  Safety Promotion/Fall Prevention:   assistive device/personal items within reach   clutter free environment maintained   fall prevention program maintained   lighting adjusted   mobility aid in reach   nonskid shoes/slippers when out of bed   room organization consistent   safety round/check completed  Intervention: Prevent Infection  Recent Flowsheet Documentation  Taken 11/7/2021 1600 by Nicko Mahmood, RN  Infection Prevention:   environmental surveillance performed   hand hygiene promoted   personal protective equipment utilized   rest/sleep promoted   single patient room provided   visitors restricted/screened  Taken 11/7/2021 1400 by Nicko Mahmood RN  Infection Prevention:   environmental surveillance performed   hand hygiene promoted   rest/sleep promoted   personal protective equipment utilized   single patient room provided   visitors restricted/screened  Taken 11/7/2021 1200 by Nicko Mahmood RN  Infection Prevention:   environmental surveillance performed   hand hygiene promoted   personal protective equipment utilized   rest/sleep promoted   single patient room provided   visitors restricted/screened  Taken 11/7/2021 1000 by Nicko Mahmood RN  Infection Prevention:   environmental surveillance performed   hand hygiene promoted   personal protective equipment utilized   rest/sleep promoted   single patient room provided   visitors restricted/screened  Taken 11/7/2021 0800 by Nicko Mahmood RN  Infection Prevention:   environmental surveillance performed   hand hygiene promoted   personal protective equipment utilized   rest/sleep promoted   single patient room provided   visitors restricted/screened  Goal: Optimal Comfort and  Wellbeing  Outcome: Ongoing, Progressing  Goal: Readiness for Transition of Care  Outcome: Ongoing, Progressing     Problem: Pain Chronic (Persistent)  Goal: Acceptable Pain Control and Functional Ability  Outcome: Ongoing, Progressing  Intervention: Manage Persistent Pain  Recent Flowsheet Documentation  Taken 11/7/2021 1600 by Nicko Mahmood RN  Medication Review/Management: medications reviewed  Taken 11/7/2021 1400 by Nicko Mahmood RN  Medication Review/Management: medications reviewed  Taken 11/7/2021 1200 by Nicko Mahmood RN  Medication Review/Management: medications reviewed  Taken 11/7/2021 1000 by Nicko Mahmood RN  Medication Review/Management: medications reviewed  Taken 11/7/2021 0800 by Nicko Mahmood RN  Medication Review/Management: medications reviewed     Problem: Breathing Pattern Ineffective  Goal: Effective Breathing Pattern  Outcome: Ongoing, Progressing   Goal Outcome Evaluation:  Plan of Care Reviewed With: patient        Progress: improving  Outcome Summary: pt. remains A&Ox4 with VSS on monitor and on 2L NC. pt. is reporting feeling better. pt. had imaging performed to rule out DVT's in upper and lower extremities which was indicated by morning labs. see results in chart. pt. pain and anxiety controlled with PRN medications with good outcomes. pt. cont. to be up ad eve. Will continue to monitor.

## 2021-11-07 NOTE — PROGRESS NOTES
[facility]     LOS: 13 days     Subjective:    CC:Shortness of Breath, Hip Pain, and Leg Pain    DIET:  Diet Order   Procedures   • Diet Regular; Vegetarian; Lacto-Ovo: Allows Dairy Products & Eggs   tachycardia to 115 with just sitting up in bed  Significant shortness of air with ambulation  No chest pain   Taking adequate oral intake  No nausea, vomiting    Objective:    Vital Signs:  Temp:  [97.6 °F (36.4 °C)-98.3 °F (36.8 °C)] 98 °F (36.7 °C)  Heart Rate:  [84-94] 86  Resp:  [18-20] 20  BP: (111-136)/(72-89) 122/73    SpO2:  [92 %-98 %] 95 %  on  Flow (L/min):  [2] 2;   Device (Oxygen Therapy): humidified; nasal cannula  Body mass index is 24.13 kg/m².    Physical Exam  Constitutional:       General: She is in acute distress.      Appearance: Normal appearance. She is ill-appearing.   HENT:      Head: Normocephalic and atraumatic.   Cardiovascular:      Rate and Rhythm: Normal rate and regular rhythm.      Heart sounds: No murmur heard.  No friction rub.   Pulmonary:      Effort: Pulmonary effort is normal.      Breath sounds: Normal breath sounds.   Abdominal:      General: Bowel sounds are normal. There is no distension.      Palpations: Abdomen is soft.      Tenderness: There is no abdominal tenderness.   Skin:     General: Skin is warm and dry.   Neurological:      Mental Status: She is alert.   Psychiatric:         Mood and Affect: Mood normal.         Behavior: Behavior normal.         Results Review:    Glucose   Date Value Ref Range Status   11/05/2021 96 65 - 99 mg/dL Final     Results from last 7 days   Lab Units 11/06/21 0438   WBC 10*3/mm3 12.92*   HEMOGLOBIN g/dL 11.3*   HEMATOCRIT % 35.2   PLATELETS 10*3/mm3 326     Results from last 7 days   Lab Units 11/07/21  0549 11/06/21  0438 11/05/21  0447   SODIUM mmol/L  --   --  140   POTASSIUM mmol/L  --   --  4.1   CHLORIDE mmol/L  --   --  101   CO2 mmol/L  --   --  26.9   BUN mg/dL  --   --  15   CREATININE mg/dL 0.34*    < > 0.54*   CALCIUM mg/dL  --   --  9.3   BILIRUBIN mg/dL  --   --  0.2   ALK PHOS U/L  --   --  84   ALT (SGPT) U/L  --   --  110*   AST (SGOT) U/L  --   --  57*   GLUCOSE mg/dL  --   --  96    < > = values in this interval not displayed.         Results from last 7 days   Lab Units 11/05/21  0447   MAGNESIUM mg/dL 2.3         Cultures:  No results found for: BLOODCX, URINECX, WOUNDCX, MRSACX, RESPCX, STOOLCX    I have reviewed daily medications and changes in CPOE    Scheduled meds  budesonide-formoterol, 2 puff, Inhalation, BID - RT  cetirizine, 10 mg, Oral, Daily  dexamethasone, 6 mg, Oral, Daily With Breakfast  enoxaparin, 40 mg, Subcutaneous, Nightly  fluticasone, 1 spray, Nasal, Daily  gabapentin, 200 mg, Oral, Nightly  lidocaine, 1 patch, Transdermal, Q24H  nystatin, 5 mL, Oral, 4x Daily  pantoprazole, 40 mg, Oral, Q AM  polyethylene glycol, 17 g, Oral, Daily  senna-docusate sodium, 2 tablet, Oral, BID  sodium chloride, 2 spray, Each Nare, BID           PRN meds  •  acetaminophen  •  ALPRAZolam  •  benzonatate  •  bisacodyl  •  cyclobenzaprine  •  HYDROcodone-acetaminophen  •  magnesium sulfate **OR** magnesium sulfate **OR** magnesium sulfate  •  melatonin  •  nitroglycerin  •  potassium & sodium phosphates **OR** potassium & sodium phosphates  •  potassium chloride  •  potassium chloride  •  simethicone  •  [COMPLETED] Insert peripheral IV **AND** sodium chloride        Pneumonia due to COVID-19 virus    Chronic bilateral low back pain    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     Covid 19 PNA with Acute Hypoxic Respiratory Failure:   -She has completed a 5-day course of remdesivir  -She was given twice daily dexamethasone now once daily  -Monitor inflammatory markers  -CT angio negative for pulmonary embolism several days ago  -Hypoxemia slowly improving  -Negative pro-Guero, no antibiotics for now  -Continue supportive care. Wean oxygen as tolerated.       Chronic low back pain:   -Gabapentin 200 nightly started.  She has Norco as needed for pain.   -Continue lidocaine patch and Flexeril     Mucositis  -She wants to try nystatin swish and spit     Elevated LFTs  - mild and likely due to COVID.      Depression and anxiety  -Anxiety medication.  PRN Xanax    Factor V Leiden: Recent DONNA article showed no benefit of anticoagulation versus placebo for DVT prophylaxis following Covid hospitalization  -dimer is rising likely from thrombophelbitis  -Recheck dimer tomorrow    Asthma: No bronchospasm currently.  Bradycardia: No heart block on ekg. continue to monitor  Constipation: Resolved.  Continue bowel regimen.     DVT PPX: Lovenox 40    Possible discharge next 1 to 2 days, still requiring 2 L of oxygen, still has tachycardia with any sort of exertion, recheck dimer in a.m. to make sure it is improving      >35 minutes spent, > 1/2 time spent counseling and coordination of care on covid and pain and elevated dimer, discussed with the patient's brother    Montrell Day MD  11/07/21  11:54 EST

## 2021-11-07 NOTE — PLAN OF CARE
Goal Outcome Evaluation:  Plan of Care Reviewed With: patient        Progress: improving  Outcome Summary: ST/SR on monitor; pt slept well overnight; anxious at times; c/o pain; meds given for both; FC to bsd; no acute distress noted; will cont to monitor;

## 2021-11-07 NOTE — PROGRESS NOTES
[facility]     LOS: 12 days     Subjective:    CC:Shortness of Breath, Hip Pain, and Leg Pain    DIET:  Diet Order   Procedures   • Diet Regular; Vegetarian; Lacto-Ovo: Allows Dairy Products & Eggs     Significant shortness of air with ambulation  No chest pain   Taking adequate oral intake  No nausea, vomiting    Objective:    Vital Signs:  Temp:  [96.5 °F (35.8 °C)-98.3 °F (36.8 °C)] 98.3 °F (36.8 °C)  Heart Rate:  [] 84  Resp:  [18-20] 20  BP: (109-115)/(72-77) 112/75    SpO2:  [90 %-98 %] 94 %  on  Flow (L/min):  [2-3] 2;   Device (Oxygen Therapy): nasal cannula  Body mass index is 24.13 kg/m².    Physical Exam  Constitutional:       General: She is in acute distress.      Appearance: Normal appearance. She is ill-appearing.   HENT:      Head: Normocephalic and atraumatic.   Cardiovascular:      Rate and Rhythm: Normal rate and regular rhythm.      Heart sounds: No murmur heard.  No friction rub.   Pulmonary:      Effort: Pulmonary effort is normal.      Breath sounds: Normal breath sounds.   Abdominal:      General: Bowel sounds are normal. There is no distension.      Palpations: Abdomen is soft.      Tenderness: There is no abdominal tenderness.   Skin:     General: Skin is warm and dry.   Neurological:      Mental Status: She is alert.   Psychiatric:         Mood and Affect: Mood normal.         Behavior: Behavior normal.         Results Review:    Glucose   Date Value Ref Range Status   11/05/2021 96 65 - 99 mg/dL Final   11/04/2021 83 65 - 99 mg/dL Final     Results from last 7 days   Lab Units 11/06/21 0438   WBC 10*3/mm3 12.92*   HEMOGLOBIN g/dL 11.3*   HEMATOCRIT % 35.2   PLATELETS 10*3/mm3 326     Results from last 7 days   Lab Units 11/06/21 0438 11/05/21 0447 11/05/21 0447   SODIUM mmol/L  --   --  140   POTASSIUM mmol/L  --   --  4.1   CHLORIDE mmol/L  --   --  101   CO2 mmol/L  --   --  26.9   BUN mg/dL  --   --  15   CREATININE mg/dL 0.52*   < > 0.54*    CALCIUM mg/dL  --   --  9.3   BILIRUBIN mg/dL  --   --  0.2   ALK PHOS U/L  --   --  84   ALT (SGPT) U/L  --   --  110*   AST (SGOT) U/L  --   --  57*   GLUCOSE mg/dL  --   --  96    < > = values in this interval not displayed.         Results from last 7 days   Lab Units 11/05/21  0447   MAGNESIUM mg/dL 2.3         Cultures:  No results found for: BLOODCX, URINECX, WOUNDCX, MRSACX, RESPCX, STOOLCX    I have reviewed daily medications and changes in CPOE    Scheduled meds  budesonide-formoterol, 2 puff, Inhalation, BID - RT  cetirizine, 10 mg, Oral, Daily  dexamethasone, 6 mg, Oral, Daily With Breakfast  enoxaparin, 40 mg, Subcutaneous, Nightly  fluticasone, 1 spray, Nasal, Daily  gabapentin, 200 mg, Oral, Nightly  lidocaine, 1 patch, Transdermal, Q24H  nystatin, 5 mL, Oral, 4x Daily  pantoprazole, 40 mg, Oral, Q AM  polyethylene glycol, 17 g, Oral, Daily  senna-docusate sodium, 2 tablet, Oral, BID  sodium chloride, 2 spray, Each Nare, BID           PRN meds  •  acetaminophen  •  ALPRAZolam  •  benzonatate  •  bisacodyl  •  cyclobenzaprine  •  HYDROcodone-acetaminophen  •  magnesium sulfate **OR** magnesium sulfate **OR** magnesium sulfate  •  melatonin  •  nitroglycerin  •  potassium & sodium phosphates **OR** potassium & sodium phosphates  •  potassium chloride  •  potassium chloride  •  simethicone  •  [COMPLETED] Insert peripheral IV **AND** sodium chloride        Pneumonia due to COVID-19 virus    Chronic bilateral low back pain    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     Covid 19 PNA with Acute Hypoxic Respiratory Failure:   -She has completed a 5-day course of remdesivir  -She was given twice daily dexamethasone now once daily  -Monitor inflammatory markers  -CT angio negative for pulmonary embolism several days ago  -Hypoxemia slowly improving  -Negative pro-Guero, no antibiotics for now  -Continue supportive care. Wean oxygen as tolerated.       Chronic low back pain:   -Gabapentin 200 nightly started.  She has Norco as needed for pain.   -Continue lidocaine patch and Flexeril     Mucositis  -She wants to try nystatin swish and spit     Elevated LFTs  - mild and likely due to COVID.      Depression and anxiety  -She is dealing with pretty significant situational depression and anxiety on.  I do believe the social isolation is probably starting to get to the patient some.  -Anxiety medication.  With Xanax    Factor V Leiden: Consideration for low-dose anticoagulation for next 6 weeks, discussed risk benefits with the patient  Asthma: No bronchospasm currently.  Bradycardia: No heart block on ekg. continue to monitor  Constipation: Resolved.  Continue bowel regimen.       DVT PPX: Lovenox 40      Montrell Day MD  11/06/21  21:44 EDT

## 2021-11-08 ENCOUNTER — READMISSION MANAGEMENT (OUTPATIENT)
Dept: CALL CENTER | Facility: HOSPITAL | Age: 46
End: 2021-11-08

## 2021-11-08 VITALS
OXYGEN SATURATION: 94 % | DIASTOLIC BLOOD PRESSURE: 68 MMHG | RESPIRATION RATE: 20 BRPM | WEIGHT: 145 LBS | HEART RATE: 83 BPM | SYSTOLIC BLOOD PRESSURE: 100 MMHG | TEMPERATURE: 98.8 F | BODY MASS INDEX: 24.16 KG/M2 | HEIGHT: 65 IN

## 2021-11-08 PROBLEM — I80.9 SUPERFICIAL THROMBOPHLEBITIS: Status: ACTIVE | Noted: 2021-11-08

## 2021-11-08 LAB
ALBUMIN SERPL-MCNC: 3.2 G/DL (ref 3.5–5.2)
ALBUMIN/GLOB SERPL: 1.1 G/DL
ALP SERPL-CCNC: 74 U/L (ref 39–117)
ALT SERPL W P-5'-P-CCNC: 73 U/L (ref 1–33)
ANION GAP SERPL CALCULATED.3IONS-SCNC: 8.9 MMOL/L (ref 5–15)
AST SERPL-CCNC: 21 U/L (ref 1–32)
BILIRUB SERPL-MCNC: 0.2 MG/DL (ref 0–1.2)
BUN SERPL-MCNC: 15 MG/DL (ref 6–20)
BUN/CREAT SERPL: 33.3 (ref 7–25)
CALCIUM SPEC-SCNC: 8.6 MG/DL (ref 8.6–10.5)
CHLORIDE SERPL-SCNC: 104 MMOL/L (ref 98–107)
CO2 SERPL-SCNC: 27.1 MMOL/L (ref 22–29)
CREAT SERPL-MCNC: 0.45 MG/DL (ref 0.57–1)
CRP SERPL-MCNC: 0.94 MG/DL (ref 0–0.5)
D DIMER PPP FEU-MCNC: 2.17 MCGFEU/ML (ref 0–0.49)
FERRITIN SERPL-MCNC: 353 NG/ML (ref 13–150)
GFR SERPL CREATININE-BSD FRML MDRD: 149 ML/MIN/1.73
GLOBULIN UR ELPH-MCNC: 3 GM/DL
GLUCOSE SERPL-MCNC: 77 MG/DL (ref 65–99)
POTASSIUM SERPL-SCNC: 3.8 MMOL/L (ref 3.5–5.2)
PROT SERPL-MCNC: 6.2 G/DL (ref 6–8.5)
SODIUM SERPL-SCNC: 140 MMOL/L (ref 136–145)

## 2021-11-08 PROCEDURE — 86140 C-REACTIVE PROTEIN: CPT | Performed by: HOSPITALIST

## 2021-11-08 PROCEDURE — 94799 UNLISTED PULMONARY SVC/PX: CPT

## 2021-11-08 PROCEDURE — 63710000001 DEXAMETHASONE PER 0.25 MG: Performed by: INTERNAL MEDICINE

## 2021-11-08 PROCEDURE — 82728 ASSAY OF FERRITIN: CPT | Performed by: INTERNAL MEDICINE

## 2021-11-08 PROCEDURE — 80053 COMPREHEN METABOLIC PANEL: CPT | Performed by: HOSPITALIST

## 2021-11-08 PROCEDURE — 85379 FIBRIN DEGRADATION QUANT: CPT | Performed by: HOSPITALIST

## 2021-11-08 RX ORDER — BUDESONIDE AND FORMOTEROL FUMARATE DIHYDRATE 160; 4.5 UG/1; UG/1
2 AEROSOL RESPIRATORY (INHALATION) 2 TIMES DAILY
Qty: 1 EACH | Refills: 0 | Status: SHIPPED | OUTPATIENT
Start: 2021-11-08

## 2021-11-08 RX ORDER — ALPRAZOLAM 0.5 MG/1
0.5 TABLET ORAL 3 TIMES DAILY PRN
Qty: 9 TABLET | Refills: 0 | Status: SHIPPED | OUTPATIENT
Start: 2021-11-08 | End: 2021-11-11

## 2021-11-08 RX ADMIN — HYDROCODONE BITARTRATE AND ACETAMINOPHEN 1 TABLET: 5; 325 TABLET ORAL at 15:07

## 2021-11-08 RX ADMIN — BUDESONIDE AND FORMOTEROL FUMARATE DIHYDRATE 2 PUFF: 160; 4.5 AEROSOL RESPIRATORY (INHALATION) at 12:00

## 2021-11-08 RX ADMIN — NYSTATIN 500000 UNITS: 100000 SUSPENSION ORAL at 12:31

## 2021-11-08 RX ADMIN — CYCLOBENZAPRINE 10 MG: 10 TABLET, FILM COATED ORAL at 02:04

## 2021-11-08 RX ADMIN — ALPRAZOLAM 0.5 MG: 0.5 TABLET ORAL at 02:04

## 2021-11-08 RX ADMIN — HYDROCODONE BITARTRATE AND ACETAMINOPHEN 1 TABLET: 5; 325 TABLET ORAL at 06:18

## 2021-11-08 RX ADMIN — ALPRAZOLAM 0.5 MG: 0.5 TABLET ORAL at 10:22

## 2021-11-08 RX ADMIN — PANTOPRAZOLE SODIUM 40 MG: 40 TABLET, DELAYED RELEASE ORAL at 06:18

## 2021-11-08 RX ADMIN — DEXAMETHASONE 6 MG: 4 TABLET ORAL at 10:21

## 2021-11-08 RX ADMIN — SALINE NASAL SPRAY 2 SPRAY: 1.5 SOLUTION NASAL at 10:21

## 2021-11-08 RX ADMIN — NYSTATIN 500000 UNITS: 100000 SUSPENSION ORAL at 10:22

## 2021-11-08 RX ADMIN — CYCLOBENZAPRINE 10 MG: 10 TABLET, FILM COATED ORAL at 10:22

## 2021-11-08 RX ADMIN — LIDOCAINE 1 PATCH: 50 PATCH CUTANEOUS at 10:23

## 2021-11-08 RX ADMIN — CETIRIZINE HYDROCHLORIDE 10 MG: 10 TABLET ORAL at 10:22

## 2021-11-08 RX ADMIN — DOCUSATE SODIUM 50MG AND SENNOSIDES 8.6MG 2 TABLET: 8.6; 5 TABLET, FILM COATED ORAL at 10:22

## 2021-11-08 NOTE — DISCHARGE SUMMARY
Patient Name: Claudia Price  : 1975  MRN: [id number]    Date of Admission: 10/25/2021  Date of Discharge:  2021  Primary Care Physician: Malika Magana CRNP      Chief Complaint:   Shortness of Breath, Hip Pain, and Leg Pain      Discharge Diagnoses     Active Hospital Problems    Diagnosis  POA   • **Pneumonia due to COVID-19 virus [U07.1, J12.82]  Yes   • Superficial thrombophlebitis [I80.9]  No   • Cytokine release syndrome, grade 2 [D89.832]  Yes   • Mucositis oral [K12.30]  No   • Elevated LFTs [R79.89]  Yes   • Acute respiratory failure with hypoxia (HCC) [J96.01]  Yes   • Steroid-induced hyperglycemia [R73.9, T38.0X5A]  No   • Factor 5 Leiden mutation, heterozygous (HCC) [D68.51]  Yes   • Raynaud phenomenon [I73.00]  Yes   • 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        Hospital Course     Ms. Price is a 46-year-old female with history of mild uncomplicated asthma who presented with shortness of breath and was admitted for Covid pneumonia with hypoxic respiratory failure.    COVID-19 pneumonia with acute hypoxic respiratory failure: She was treated with a 5-day course of remdesivir and a prolonged course of steroids.  She had negative pro-Guero throughout her stay and no signs of superimposed bacterial pneumonia.  She did not receive antibiotics.  She required up to 9 L of oxygen by nasal cannula and had a prolonged hospital stay due to difficulty weaning oxygen.  She will be discharged on 2 L oxygen.    Elevated D-dimer: She had negative CTA chest and lower extremity Dopplers.  Upper extremity ultrasound showed septic thrombophlebitis.  She is heterozygous for factor V Leiden.  Her case was discussed with pharmacy and we decided not to discharge her on prophylactic anticoagulation as a recent DONNA article showed no benefit of anticoagulation  versus placebo following Covid hospitalization.    Anxiety: Given 3 days of Xanax as needed on discharge.  Anxiety mostly related to her illness and prolonged hospitalization.    Chronic low back pain: Pain exacerbated by prolonged hospitalization and limited mobility from hypoxia.  Started gabapentin 200 nightly, Norco 5 mg, lidocaine patches and Flexeril.     Day of Discharge     Subjective:  Sitting up in chair, on 2L.  Feeling short of breath and fatigued with minimal exertion.  She is very deconditioned after this hospital stay.  She will be staying with her parents and will have other family at home to help her.    Review of Systems   Constitutional: Negative for chills and fever.   Respiratory: Negative for cough and shortness of breath.    Cardiovascular: Negative for chest pain, palpitations and leg swelling.   Gastrointestinal: Negative for abdominal pain, blood in stool, nausea and vomiting.   Genitourinary: Negative for dysuria and hematuria.       Physical Exam:  Temp:  [98 °F (36.7 °C)-98.8 °F (37.1 °C)] 98.8 °F (37.1 °C)  Heart Rate:  [] 83  Resp:  [18-20] 20  BP: (100-128)/(68-87) 100/68  Body mass index is 24.13 kg/m².  Physical Exam  Constitutional:       General: She is not in acute distress.     Appearance: She is not diaphoretic.   HENT:      Mouth/Throat:      Pharynx: No oropharyngeal exudate or posterior oropharyngeal erythema.   Cardiovascular:      Rate and Rhythm: Normal rate and regular rhythm.      Pulses: Normal pulses.      Heart sounds: No murmur heard.  No gallop.    Pulmonary:      Effort: Pulmonary effort is normal. No respiratory distress.      Breath sounds: No wheezing or rhonchi.   Abdominal:      Palpations: Abdomen is soft.      Tenderness: There is no abdominal tenderness. There is no guarding.   Skin:     General: Skin is warm and dry.   Neurological:      Mental Status: She is alert.         Consultants     Consult Orders (all) (From admission, onward)     Start      Ordered    10/30/21 1100  Inpatient Spiritual Care Consult  Once        Comments: COVID isolation   Provider:  (Not yet assigned)    10/30/21 1059    10/27/21 1205  Inpatient Pulmonology Consult  Once        Specialty:  Pulmonary Disease  Provider:  Caesar Guillen MD    10/27/21 1205    10/25/21 1427  [facility] (on-call MD unless specified) Details  Once        Specialty:  Hospitalist  Provider:  (Not yet assigned)    10/25/21 1426              Procedures     Imaging Results (All)     Procedure Component Value Units Date/Time    XR Chest 1 View [[id number]] Collected: 11/04/21 1427     Updated: 11/04/21 1431    Narrative:      XR CHEST 1 VW-     HISTORY: Female who is 46 years-old,  worsening hypoxia     TECHNIQUE: Frontal view of the chest     COMPARISON: 10/31/2021     FINDINGS: The heart size is borderline. Pulmonary vasculature is  unremarkable. Patchy infiltrates predominantly at the mid to lower lungs  show interval worsening no pleural effusion, or pneumothorax. No acute  osseous process.       Impression:      Interval worsening of bilateral infiltrates, continued  follow-up recommended.     This report was finalized on 11/4/2021 2:28 PM by Dr. Roman Frazier M.D.       XR Chest 1 View [[id number]] Collected: 10/31/21 0546     Updated: 10/31/21 0551    Narrative:      SINGLE VIEW OF THE CHEST     HISTORY: COVID     COMPARISON: 10/25/2021     FINDINGS:  Bilateral infiltrates are unchanged when compared to prior exam. Heart  size is within normal limits. No pneumothorax or pleural effusion is  seen.       Impression:      No significant interval change.     This report was finalized on 10/31/2021 5:48 AM by Dr. Jaida Proctor M.D.       CT Angiogram Chest With & Without Contrast [[id number]] Collected: 10/28/21 2327     Updated: 10/28/21 2327    Narrative:        Patient: MARRY JENKINS  Time Out: 23:26  Exam(s): CTA CHEST W WO Contrast     EXAM:    CT Angiography Chest Without and With Intravenous  Contrast    CLINICAL HISTORY:     Reason for exam: worsening hypoxia, covid.    TECHNIQUE:    Axial computed tomographic angiography images of the chest without and   with intravenous contrast.  CTDI is 13.7 mGy and DLP is 511.9 mGy-cm.    This CT exam was performed according to the principle of ALARA (As Low As   Reasonably Achievable) by using one or more of the following dose   reduction techniques: automated exposure control, adjustment of the mA   and or kV according to patient size, and or use of iterative   reconstruction technique.    MIP reconstructed images were created and reviewed.    COMPARISON:    Chest x-ray study of 10 25 2021.  Diffuse patchy airspace disease is   noted throughout both lungs compatible with Covid-19 pneumonia.    FINDINGS:    Pulmonary arteries:  No central pulmonary emboli.  Limited evaluation   of the peripheral branch of the pulmonary arteries which are grossly   unremarkable.    Aorta:  Mild dilatation of the ascending thoracic aorta which measures   approximate 3.7 cm in diameter.  Is there a history of etiology such as   hypertension question    Lungs:  The airway is normal.  No mass.    Pleural space:  Unremarkable.  No significant effusion.  No   pneumothorax.    Heart:  Unremarkable.  No cardiomegaly.  No significant pericardial   effusion.  No evidence of RV dysfunction.    Thyroid:  The thyroid gland is grossly unremarkable.    Bones joints:  No acute fracture.  No dislocation.    Soft tissues:  Unremarkable.    Lymph nodes:  Unremarkable.  No enlarged lymph nodes.    Liver:  Fatty infiltration of the liver.    Other findings:  Mild hypoaeration.    IMPRESSION:       1.  No central or peripheral pulmonary embolism.  2.  Hypoaeration.  3.  Diffuse patchy airspace disease is noted compatible with Covid-19   pneumonia.  4.  Mild dilatation of the ascending thoracic aorta.  Is there a history   of etiology such as hypertension?      Impression:          Electronically signed  by Se Thayer MD on 10-28-21 at 2326    XR Chest 1 View [[id number]] Collected: 10/25/21 1416     Updated: 10/25/21 1421    Narrative:      XR CHEST 1 VW-     Clinical: Covid positive     COMPARISON 10/23/2021     FINDINGS: There are now multifocal areas of infiltrate in the mid and  lower lung zones consistent with Covid pneumonia. Progressive airspace  disease right lung, new airspace disease left lung. No effusion or edema  is demonstrated. Heart size within normal limits. No mediastinal or  hilar abnormality.     CONCLUSION: The findings are compatible with Covid pneumonia as  described above.     This report was finalized on 10/25/2021 2:18 PM by Dr. Dharmesh Luna M.D.             Pertinent Labs     Results from last 7 days   Lab Units 11/06/21  0438 11/05/21  0446 11/04/21  0551 11/03/21  0805   WBC 10*3/mm3 12.92* 12.17* 13.67* 16.51*   HEMOGLOBIN g/dL 11.3* 12.1 11.7* 14.3   PLATELETS 10*3/mm3 326 316 324 359     Results from last 7 days   Lab Units 11/08/21  0516 11/07/21  0549 11/06/21  0438 11/05/21  0447 11/05/21  0035 11/04/21  0551 11/04/21  0551 11/03/21  0805 11/03/21  0805   SODIUM mmol/L 140  --   --  140  --   --  138  --  138   POTASSIUM mmol/L 3.8  --   --  4.1 4.1  --  4.3  4.3   < > 3.4*   CHLORIDE mmol/L 104  --   --  101  --   --  104  --  99   CO2 mmol/L 27.1  --   --  26.9  --   --  24.3  --  26.1   BUN mg/dL 15  --   --  15  --   --  12  --  14   CREATININE mg/dL 0.45* 0.34* 0.52* 0.54*  --    < > 0.51*   < > 0.63   GLUCOSE mg/dL 77  --   --  96  --   --  83  --  66    < > = values in this interval not displayed.   Estimated Creatinine Clearance: 160.5 mL/min (A) (by C-G formula based on SCr of 0.45 mg/dL (L)).  Results from last 7 days   Lab Units 11/08/21  0516 11/05/21  0447 11/04/21  0551 11/03/21  0805   ALBUMIN g/dL 3.20* 3.50 3.30* 3.80   BILIRUBIN mg/dL 0.2 0.2 0.3 0.9   ALK PHOS U/L 74 84 76 85   AST (SGOT) U/L 21 57* 33* 31   ALT (SGPT) U/L 73* 110* 54* 63*     Results  from last 7 days   Lab Units 11/08/21  0516 11/05/21  0447 11/05/21  0035 11/04/21  0551 11/03/21  0805 11/02/21  0606 11/02/21  0606   CALCIUM mg/dL 8.6 9.3  --  8.9 9.1   < > 9.1   ALBUMIN g/dL 3.20* 3.50  --  3.30* 3.80   < > 3.80   MAGNESIUM mg/dL  --  2.3 2.3  --   --   --   --    PHOSPHORUS mg/dL  --  3.6  --   --   --   --  4.2    < > = values in this interval not displayed.       Results from last 7 days   Lab Units 11/08/21 0516 11/07/21  1310 11/07/21  0549 11/05/21 0447 11/04/21 0551 11/03/21  0805   TROPONIN T ng/mL  --  <0.010  --   --   --   --    PROBNP pg/mL  --   --   --   --  66.2  --    D DIMER QUANT MCGFEU/mL 2.17*  --  2.16* 0.88*  --  0.63*           Invalid input(s): LDLCALC        Test Results Pending at Discharge       Discharge Details        Discharge Medications      New Medications      Instructions Start Date   ALPRAZolam 0.5 MG tablet  Commonly known as: XANAX   0.5 mg, Oral, 3 Times Daily PRN      budesonide-formoterol 160-4.5 MCG/ACT inhaler  Commonly known as: SYMBICORT   2 puffs, Inhalation, 2 Times Daily      cyclobenzaprine 10 MG tablet  Commonly known as: FLEXERIL   10 mg, Oral, 3 Times Daily PRN      dexamethasone 2 MG tablet  Commonly known as: DECADRON   Take 3 tablets daily by mouth for 4 days. Then take 2 tablets daily by mouth for 4 days. Then take 1 tablet by mouth for 4 days. Then stop      gabapentin 100 MG capsule  Commonly known as: NEURONTIN   200 mg, Oral, Nightly      HYDROcodone-acetaminophen 5-325 MG per tablet  Commonly known as: NORCO   1 tablet, Oral, Every 4 Hours PRN      lidocaine 5 %  Commonly known as: LIDODERM   1 patch, Transdermal, Every 24 Hours Scheduled, Remove & Discard patch within 12 hours or as directed by MD      nystatin 100,000 unit/mL suspension  Commonly known as: MYCOSTATIN   500,000 Units, Swish & Swallow, 4 Times Daily         Continue These Medications      Instructions Start Date   benzonatate 200 MG capsule  Commonly known as:  "TESSALON   200 mg, Oral, 3 Times Daily PRN      cetirizine 10 MG tablet  Commonly known as: zyrTEC   10 mg, Oral      EVENING PRIMROSE OIL PO   Oral      fluticasone 50 MCG/ACT nasal spray  Commonly known as: FLONASE   1 spray, Nasal      pantoprazole 40 MG EC tablet  Commonly known as: PROTONIX   40 mg, Oral, Daily      ubrogepant 100 MG tablet  Generic drug: ubrogepant   No dose, route, or frequency recorded.         Stop These Medications    azithromycin 250 MG tablet  Commonly known as: Zithromax Z-Joshua            Allergies   Allergen Reactions   • Pseudoephedrine Anxiety and Other (See Comments)     Tachycardia 160's, \"felt like a heart attack\"   • Nedocromil          Discharge Disposition:  Home or Self Care    Discharge Diet:  Diet Order   Procedures   • Diet Regular; Vegetarian; Lacto-Ovo: Allows Dairy Products & Eggs       Discharge Activity:   as tolerated    CODE STATUS:    Code Status and Medical Interventions:   Ordered at: 10/25/21 2016     Code Status (Patient has no pulse and is not breathing):    CPR (Attempt to Resuscitate)     Medical Interventions (Patient has pulse or is breathing):    Full       No future appointments.   Follow-up Information     Malika Magana CRNP. Schedule an appointment as soon as possible for a visit in 1 week(s).    Specialty: Family Medicine  Contact information:  [address] [facility] [address] [phone]                         Time Spent on Discharge:  Greater than 30 minutes      DO [facility]  11/08/21  14:11 EST              "

## 2021-11-08 NOTE — CASE MANAGEMENT/SOCIAL WORK
Continued Stay Note  [facility]     Patient Name: Claudia Price  MRN: [id number]  Today's Date: 11/8/2021    Admit Date: 10/25/2021     Discharge Plan     Row Name 11/08/21 1430       Plan    Plan Comments DC orders noted. Spoke with [facility] d/t RA sat at rest 85%. Orders noted for home O2. [facility] will deliver a portable tank to the unit. Spoke with patient via room phone and she is agreeable to DC today and family will transport. Megan Valadez RN CCP               Discharge Codes    No documentation.               Expected Discharge Date and Time     Expected Discharge Date Expected Discharge Time    Nov 8, 2021             Megan Valadez RN

## 2021-11-08 NOTE — PLAN OF CARE
Problem: Adult Inpatient Plan of Care  Goal: Plan of Care Review  Outcome: Ongoing, Progressing  Flowsheets (Taken 11/8/2021 0451)  Plan of Care Reviewed With: patient  Outcome Summary:   No s/sx of distress noted at this time. Rested well throughout night. Vital signs WDL. 02@2L NC   tolerated. PRN pain meds given for lower back/sciatic pain per orders with relief noted. Will cont to monitor.  Goal: Patient-Specific Goal (Individualized)  Outcome: Ongoing, Progressing  Goal: Absence of Hospital-Acquired Illness or Injury  Outcome: Ongoing, Progressing  Intervention: Identify and Manage Fall Risk  Recent Flowsheet Documentation  Taken 11/8/2021 0412 by Nereyda Borrero, RN  Safety Promotion/Fall Prevention:   activity supervised   assistive device/personal items within reach   clutter free environment maintained   fall prevention program maintained   lighting adjusted   nonskid shoes/slippers when out of bed   safety round/check completed   room organization consistent  Taken 11/8/2021 0218 by Nereyda Borrero, RN  Safety Promotion/Fall Prevention:   activity supervised   assistive device/personal items within reach   clutter free environment maintained   fall prevention program maintained   lighting adjusted   nonskid shoes/slippers when out of bed   room organization consistent   safety round/check completed  Taken 11/8/2021 0028 by Nereyda Borrero, RN  Safety Promotion/Fall Prevention:   activity supervised   assistive device/personal items within reach   clutter free environment maintained   fall prevention program maintained   lighting adjusted   nonskid shoes/slippers when out of bed   room organization consistent   safety round/check completed  Taken 11/7/2021 2159 by Nereyda Borrero, RN  Safety Promotion/Fall Prevention:   activity supervised   assistive device/personal items within reach   clutter free environment maintained   fall prevention program maintained   lighting adjusted   nonskid shoes/slippers when out of  bed   room organization consistent   safety round/check completed  Taken 11/7/2021 2014 by Nereyda Borrero RN  Safety Promotion/Fall Prevention:   activity supervised   assistive device/personal items within reach   clutter free environment maintained   fall prevention program maintained   lighting adjusted   nonskid shoes/slippers when out of bed   safety round/check completed   room organization consistent  Intervention: Prevent Skin Injury  Recent Flowsheet Documentation  Taken 11/8/2021 0412 by Nereyda Borrero RN  Body Position:   position changed independently   side-lying, left  Taken 11/8/2021 0218 by Nereyda Borrero RN  Body Position:   position changed independently   sitting up in bed  Taken 11/8/2021 0028 by Nereyda Borrero RN  Body Position:   position changed independently   side-lying, right  Taken 11/7/2021 2159 by Nereyda Borrero RN  Body Position:   position changed independently   supine  Taken 11/7/2021 2014 by Nereyda Borrero RN  Body Position:   position changed independently   sitting up in bed  Skin Protection:   adhesive use limited   transparent dressing maintained   tubing/devices free from skin contact  Intervention: Prevent and Manage VTE (venous thromboembolism) Risk  Recent Flowsheet Documentation  Taken 11/8/2021 0028 by Nereyda Borrero RN  VTE Prevention/Management: (Lovenox) other (see comments)  Taken 11/7/2021 2014 by Nereyda Borrero RN  VTE Prevention/Management: (Lovenox) other (see comments)  Intervention: Prevent Infection  Recent Flowsheet Documentation  Taken 11/8/2021 0412 by Nereyda Borrero RN  Infection Prevention:   personal protective equipment utilized   rest/sleep promoted   single patient room provided  Taken 11/8/2021 0218 by Nereyda Borrero RN  Infection Prevention:   personal protective equipment utilized   rest/sleep promoted   single patient room provided  Taken 11/8/2021 0028 by Nereyda Borrero RN  Infection Prevention:   hand hygiene promoted   personal protective equipment  utilized   rest/sleep promoted   single patient room provided  Taken 11/7/2021 2159 by Nereyda Borrero RN  Infection Prevention:   personal protective equipment utilized   rest/sleep promoted   single patient room provided  Taken 11/7/2021 2014 by Nereyda Borrero RN  Infection Prevention:   personal protective equipment utilized   rest/sleep promoted   single patient room provided  Goal: Optimal Comfort and Wellbeing  Outcome: Ongoing, Progressing  Intervention: Provide Person-Centered Care  Recent Flowsheet Documentation  Taken 11/7/2021 2014 by Nereyda Borrero RN  Trust Relationship/Rapport:   care explained   reassurance provided   thoughts/feelings acknowledged  Goal: Readiness for Transition of Care  Outcome: Ongoing, Progressing     Problem: Pain Chronic (Persistent)  Goal: Acceptable Pain Control and Functional Ability  Outcome: Ongoing, Progressing  Intervention: Manage Persistent Pain  Recent Flowsheet Documentation  Taken 11/8/2021 0412 by Nereyda Borrero RN  Medication Review/Management: medications reviewed  Taken 11/8/2021 0218 by Nereyda Borrero RN  Medication Review/Management: medications reviewed  Taken 11/8/2021 0028 by Nereyda Borrero RN  Sleep/Rest Enhancement:   awakenings minimized   regular sleep/rest pattern promoted   relaxation techniques promoted   room darkened  Medication Review/Management: medications reviewed  Taken 11/7/2021 2159 by Nereyda Borrero RN  Medication Review/Management: medications reviewed  Taken 11/7/2021 2014 by Nereyda Borrero RN  Sleep/Rest Enhancement:   awakenings minimized   regular sleep/rest pattern promoted   relaxation techniques promoted   room darkened  Medication Review/Management: medications reviewed  Intervention: Optimize Psychosocial Wellbeing  Recent Flowsheet Documentation  Taken 11/8/2021 0028 by Nereyda Borrero RN  Supportive Measures:   active listening utilized   verbalization of feelings encouraged  Taken 11/7/2021 2014 by Nereyda Borrero RN  Supportive Measures:    active listening utilized   verbalization of feelings encouraged     Problem: Breathing Pattern Ineffective  Goal: Effective Breathing Pattern  Outcome: Ongoing, Progressing  Intervention: Promote Improved Breathing Pattern  Recent Flowsheet Documentation  Taken 11/8/2021 0412 by Nereyda Borrero RN  Head of Bed (HOB): HOB at 15 degrees  Taken 11/8/2021 0218 by Nereyda Borrero RN  Head of Bed (HOB): HOB flat  Taken 11/8/2021 0028 by Nereyda Borrero RN  Supportive Measures:   active listening utilized   verbalization of feelings encouraged  Head of Bed (HOB): HOB lowered  Taken 11/7/2021 2159 by Nereyda Borrero RN  Head of Bed (HOB): HOB at 20-30 degrees  Taken 11/7/2021 2014 by Nereyda Borrero RN  Supportive Measures:   active listening utilized   verbalization of feelings encouraged   Goal Outcome Evaluation:  Plan of Care Reviewed With: patient           Outcome Summary: No s/sx of distress noted at this time. Rested well throughout night. Vital signs WDL. 02@2L NC; tolerated. PRN pain meds given for lower back/sciatic pain per orders with relief noted. Will cont to monitor.

## 2021-11-08 NOTE — PAYOR COMM NOTE
"Claudia Jenkins (46 y.o. Female)     PLEASE CALL   OR  [phone] WITH CONTINUED STAY AUTH.     REF#[id number]        THANK YOU    TONY CASTELAN LPN CCP            Date of Birth Social Security Number Address Home Phone MRN    1975  [address]  [facility] [id number] [phone] [id number]    Islam Marital Status             None        Admission Date Admission Type Admitting Provider Attending Provider Department, Room/Bed    10/25/21 Emergency Juvenal Fung MD George, Katherine, DO [address], [address]/1    Discharge Date Discharge Disposition Discharge Destination           Home or Self Care              Attending Provider: Vicki Owens DO    Allergies: Pseudoephedrine, Nedocromil    Isolation: Enh Drop/Con   Infection: COVID (confirmed) (10/20/21)   Code Status: CPR   Advance Care Planning Activity    Ht: 165.1 cm (65\")   Wt: 65.8 kg (145 lb)    Admission Cmt: None   Principal Problem: Pneumonia due to COVID-19 virus [U07.1,J12.82]                 Active Insurance as of 10/25/2021     Primary Coverage     Payor Plan Insurance Group Employer/Plan Group    [facility] [employer] [facility] PPO [id number]     Payor Plan Address Payor Plan Phone Number Payor Plan Fax Number Effective Dates    [address] [phone]  6/1/2014 - None Entered    [place] [address]       Subscriber Name Subscriber Birth Date Member ID       DONI JENKINS 3/27/1973 [id number]                 Emergency Contacts      (Rel.) Home Phone Work Phone Mobile Phone    Doni Jenkins (Spouse) -- -- [phone]    Joao Aguilar (Brother) -- -- [phone]              Oxygen Therapy (last 3 days)     Date/Time SpO2 Device (Oxygen Therapy) Flow (L/min) Oxygen Concentration (%) ETCO2 (mmHg)    11/08/21 1322 94 nasal cannula 2 -- --    11/08/21 1200 -- nasal cannula 2 -- --    11/08/21 1020 -- nasal cannula 2 -- --    11/08/21 0734 94 nasal cannula 2 -- " --    11/08/21 0500 98 -- -- -- --    11/08/21 0412 97 -- -- -- --    11/08/21 0300 97 -- -- -- --    11/08/21 0250 97 -- -- -- --    11/08/21 0100 96 -- -- -- --    11/08/21 0028 -- nasal cannula 2 -- --    11/07/21 2310 97 nasal cannula 2 -- --    11/07/21 2134 90 nasal cannula 2 -- --    11/07/21 2132 97 nasal cannula 2 -- --    11/07/21 2100 97 -- -- -- --    11/07/21 2014 -- nasal cannula 2 -- --    11/07/21 2010 95 nasal cannula 2 -- --    11/07/21 1440 -- nasal cannula 2 -- --    11/07/21 1419 93 -- -- -- --    11/07/21 0800 -- humidified; nasal cannula 2 -- --    11/07/21 0727 95 nasal cannula; humidified 2 -- --    11/07/21 0713 98 -- -- -- --    11/07/21 0012 -- nasal cannula 2 -- --    11/06/21 2327 92 nasal cannula 2 -- --    11/06/21 2016 -- nasal cannula 2 -- --    11/06/21 1941 94 nasal cannula 2 -- --    11/06/21 1919 94 nasal cannula 2 -- --    11/06/21 1404 -- nasal cannula 2 -- --    11/06/21 1357 94 nasal cannula 2 -- --    11/06/21 1320 -- nasal cannula 2 -- --    11/06/21 0915 93 nasal cannula; humidified 3 -- --    11/06/21 0800 -- nasal cannula 3 -- --    11/06/21 0722 90 nasal cannula; humidified 3 -- --    11/06/21 0719 94 nasal cannula 3 -- --    11/05/21 2334 98 nasal cannula 3 -- --    11/05/21 2018 -- nasal cannula -- -- --    11/05/21 1900 92 nasal cannula; humidified 3 -- --    11/05/21 1428 -- nasal cannula 3 -- --    11/05/21 1405 94 nasal cannula; humidified 3 -- --    11/05/21 1149 91 nasal cannula; humidified 3 -- --    11/05/21 1146 92 -- -- -- --    11/05/21 1140 94 -- 3 -- --    11/05/21 0930 93 nasal cannula; humidified 4 -- --    11/05/21 0928 -- nasal cannula; humidified 4 -- --    11/05/21 0905 91 -- 5 -- --    11/05/21 0855 87 nasal cannula 4 -- --    11/05/21 0755 94 nasal cannula 4 -- --    11/05/21 0019 -- nasal cannula 4 -- --        Intake & Output (last 3 days)       11/05 0701 11/06 0700 11/06 0701 11/07 0700 11/07 0701 11/08 0700 11/08 0701 11/09 0700    P.O.       Total Intake(mL/kg) 240 (3.6) 360 (5.5) 1020 (15.5)     Urine (mL/kg/hr) 1600 (1) 1700 (1.1) 2550 (1.6) 1400 (2.3)    Stool 0 0 0     Total Output 1600 1700 2550 1400    Net -1360 -1340 -1530 -1400            Urine Unmeasured Occurrence 1 x       Stool Unmeasured Occurrence 1 x 1 x 4 x          Lines, Drains & Airways     Active LDAs     None          Inactive LDAs     Name Placement date Placement time Removal date Removal time Site Days    [REMOVED] Peripheral IV 10/25/21 1347 Right Hand 10/25/21  1347  10/27/21  2200  Hand  2    [REMOVED] Peripheral IV 10/27/21 0517 Anterior; Left; Proximal Forearm 10/27/21  0517  10/27/21  2200  Forearm  less than 1    [REMOVED] Peripheral IV 10/27/21 2348 Left; Posterior Hand 10/27/21  2348  10/28/21  2225  Hand  less than 1    [REMOVED] Peripheral IV 10/28/21 1312 Left Antecubital 10/28/21  1312  11/07/21  IV WAS REMOVED PRIOR TO MY SHIFT  1800  IV WAS REMOVED PRIOR TO MY SHIFT  Antecubital  10    [REMOVED] Peripheral IV 11/07/21  Right; Lower Forearm 11/07/21  --  IV WAS PLACED PRIOR TO MY SHIFT  11/08/21  1524  Forearm  1                Operative/Procedure Notes (last 72 hours)  Notes from 11/05/21 1610 through 11/08/21 1610   No notes of this type exist for this encounter.            Physician Progress Notes (last 72 hours)      Montrell Day MD at 11/07/21 1154              [facility]     LOS: 13 days     Subjective:    CC:Shortness of Breath, Hip Pain, and Leg Pain    DIET:  Diet Order   Procedures   • Diet Regular; Vegetarian; Lacto-Ovo: Allows Dairy Products & Eggs   tachycardia to 115 with just sitting up in bed  Significant shortness of air with ambulation  No chest pain   Taking adequate oral intake  No nausea, vomiting    Objective:    Vital Signs:  Temp:  [97.6 °F (36.4 °C)-98.3 °F (36.8 °C)] 98 °F (36.7 °C)  Heart Rate:  [84-94] 86  Resp:  [18-20] 20  BP: (111-136)/(72-89) 122/73    SpO2:  [92 %-98 %] 95 %  on  Flow  (L/min):  [2] 2;   Device (Oxygen Therapy): humidified; nasal cannula  Body mass index is 24.13 kg/m².    Physical Exam  Constitutional:       General: She is in acute distress.      Appearance: Normal appearance. She is ill-appearing.   HENT:      Head: Normocephalic and atraumatic.   Cardiovascular:      Rate and Rhythm: Normal rate and regular rhythm.      Heart sounds: No murmur heard.  No friction rub.   Pulmonary:      Effort: Pulmonary effort is normal.      Breath sounds: Normal breath sounds.   Abdominal:      General: Bowel sounds are normal. There is no distension.      Palpations: Abdomen is soft.      Tenderness: There is no abdominal tenderness.   Skin:     General: Skin is warm and dry.   Neurological:      Mental Status: She is alert.   Psychiatric:         Mood and Affect: Mood normal.         Behavior: Behavior normal.         Results Review:    Glucose   Date Value Ref Range Status   11/05/2021 96 65 - 99 mg/dL Final     Results from last 7 days   Lab Units 11/06/21  0438   WBC 10*3/mm3 12.92*   HEMOGLOBIN g/dL 11.3*   HEMATOCRIT % 35.2   PLATELETS 10*3/mm3 326     Results from last 7 days   Lab Units 11/07/21  0549 11/06/21  0438 11/05/21  0447   SODIUM mmol/L  --   --  140   POTASSIUM mmol/L  --   --  4.1   CHLORIDE mmol/L  --   --  101   CO2 mmol/L  --   --  26.9   BUN mg/dL  --   --  15   CREATININE mg/dL 0.34*   < > 0.54*   CALCIUM mg/dL  --   --  9.3   BILIRUBIN mg/dL  --   --  0.2   ALK PHOS U/L  --   --  84   ALT (SGPT) U/L  --   --  110*   AST (SGOT) U/L  --   --  57*   GLUCOSE mg/dL  --   --  96    < > = values in this interval not displayed.         Results from last 7 days   Lab Units 11/05/21  0447   MAGNESIUM mg/dL 2.3         Cultures:  No results found for: BLOODCX, URINECX, WOUNDCX, MRSACX, RESPCX, STOOLCX    I have reviewed daily medications and changes in CPOE    Scheduled meds  budesonide-formoterol, 2 puff, Inhalation, BID - RT  cetirizine, 10 mg, Oral, Daily  dexamethasone, 6  mg, Oral, Daily With Breakfast  enoxaparin, 40 mg, Subcutaneous, Nightly  fluticasone, 1 spray, Nasal, Daily  gabapentin, 200 mg, Oral, Nightly  lidocaine, 1 patch, Transdermal, Q24H  nystatin, 5 mL, Oral, 4x Daily  pantoprazole, 40 mg, Oral, Q AM  polyethylene glycol, 17 g, Oral, Daily  senna-docusate sodium, 2 tablet, Oral, BID  sodium chloride, 2 spray, Each Nare, BID           PRN meds  •  acetaminophen  •  ALPRAZolam  •  benzonatate  •  bisacodyl  •  cyclobenzaprine  •  HYDROcodone-acetaminophen  •  magnesium sulfate **OR** magnesium sulfate **OR** magnesium sulfate  •  melatonin  •  nitroglycerin  •  potassium & sodium phosphates **OR** potassium & sodium phosphates  •  potassium chloride  •  potassium chloride  •  simethicone  •  [COMPLETED] Insert peripheral IV **AND** sodium chloride        Pneumonia due to COVID-19 virus    Chronic bilateral low back pain    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     Covid 19 PNA with Acute Hypoxic Respiratory Failure:   -She has completed a 5-day course of remdesivir  -She was given twice daily dexamethasone now once daily  -Monitor inflammatory markers  -CT angio negative for pulmonary embolism several days ago  -Hypoxemia slowly improving  -Negative pro-Guero, no antibiotics for now  -Continue supportive care. Wean oxygen as tolerated.       Chronic low back pain:   -Gabapentin 200 nightly started.  She has Norco as needed for pain.   -Continue lidocaine patch and Flexeril     Mucositis  -She wants to try nystatin swish and  spit     Elevated LFTs  - mild and likely due to COVID.      Depression and anxiety  -Anxiety medication.  PRN Xanax    Factor V Leiden: Recent DONNA article showed no benefit of anticoagulation versus placebo for DVT prophylaxis following Covid hospitalization  -dimer is rising likely from thrombophelbitis  -Recheck dimer tomorrow    Asthma: No bronchospasm currently.  Bradycardia: No heart block on ekg. continue to monitor  Constipation: Resolved.  Continue bowel regimen.     DVT PPX: Lovenox 40    Possible discharge next 1 to 2 days, still requiring 2 L of oxygen, still has tachycardia with any sort of exertion, recheck dimer in a.m. to make sure it is improving      >35 minutes spent, > 1/2 time spent counseling and coordination of care on covid and pain and elevated dimer, discussed with the patient's brother    Montrell Day MD  11/07/21  11:54 EST        Electronically signed by Montrell Day MD at 11/07/21 1653     Montrell Day MD at 11/06/21 4298              [facility]     LOS: 12 days     Subjective:    CC:Shortness of Breath, Hip Pain, and Leg Pain    DIET:  Diet Order   Procedures   • Diet Regular; Vegetarian; Lacto-Ovo: Allows Dairy Products & Eggs     Significant shortness of air with ambulation  No chest pain   Taking adequate oral intake  No nausea, vomiting    Objective:    Vital Signs:  Temp:  [96.5 °F (35.8 °C)-98.3 °F (36.8 °C)] 98.3 °F (36.8 °C)  Heart Rate:  [] 84  Resp:  [18-20] 20  BP: (109-115)/(72-77) 112/75    SpO2:  [90 %-98 %] 94 %  on  Flow (L/min):  [2-3] 2;   Device (Oxygen Therapy): nasal cannula  Body mass index is 24.13 kg/m².    Physical Exam  Constitutional:       General: She is in acute distress.      Appearance: Normal appearance. She is ill-appearing.   HENT:      Head: Normocephalic and atraumatic.   Cardiovascular:      Rate and Rhythm: Normal rate and regular rhythm.      Heart sounds: No murmur heard.  No friction rub.    Pulmonary:      Effort: Pulmonary effort is normal.      Breath sounds: Normal breath sounds.   Abdominal:      General: Bowel sounds are normal. There is no distension.      Palpations: Abdomen is soft.      Tenderness: There is no abdominal tenderness.   Skin:     General: Skin is warm and dry.   Neurological:      Mental Status: She is alert.   Psychiatric:         Mood and Affect: Mood normal.         Behavior: Behavior normal.         Results Review:    Glucose   Date Value Ref Range Status   11/05/2021 96 65 - 99 mg/dL Final   11/04/2021 83 65 - 99 mg/dL Final     Results from last 7 days   Lab Units 11/06/21 0438   WBC 10*3/mm3 12.92*   HEMOGLOBIN g/dL 11.3*   HEMATOCRIT % 35.2   PLATELETS 10*3/mm3 326     Results from last 7 days   Lab Units 11/06/21 0438 11/05/21 0447 11/05/21 0447   SODIUM mmol/L  --   --  140   POTASSIUM mmol/L  --   --  4.1   CHLORIDE mmol/L  --   --  101   CO2 mmol/L  --   --  26.9   BUN mg/dL  --   --  15   CREATININE mg/dL 0.52*   < > 0.54*   CALCIUM mg/dL  --   --  9.3   BILIRUBIN mg/dL  --   --  0.2   ALK PHOS U/L  --   --  84   ALT (SGPT) U/L  --   --  110*   AST (SGOT) U/L  --   --  57*   GLUCOSE mg/dL  --   --  96    < > = values in this interval not displayed.         Results from last 7 days   Lab Units 11/05/21 0447   MAGNESIUM mg/dL 2.3         Cultures:  No results found for: BLOODCX, URINECX, WOUNDCX, MRSACX, RESPCX, STOOLCX    I have reviewed daily medications and changes in CPOE    Scheduled meds  budesonide-formoterol, 2 puff, Inhalation, BID - RT  cetirizine, 10 mg, Oral, Daily  dexamethasone, 6 mg, Oral, Daily With Breakfast  enoxaparin, 40 mg, Subcutaneous, Nightly  fluticasone, 1 spray, Nasal, Daily  gabapentin, 200 mg, Oral, Nightly  lidocaine, 1 patch, Transdermal, Q24H  nystatin, 5 mL, Oral, 4x Daily  pantoprazole, 40 mg, Oral, Q AM  polyethylene glycol, 17 g, Oral, Daily  senna-docusate sodium, 2 tablet, Oral, BID  sodium chloride, 2 spray, Each Nare,  BID           PRN meds  •  acetaminophen  •  ALPRAZolam  •  benzonatate  •  bisacodyl  •  cyclobenzaprine  •  HYDROcodone-acetaminophen  •  magnesium sulfate **OR** magnesium sulfate **OR** magnesium sulfate  •  melatonin  •  nitroglycerin  •  potassium & sodium phosphates **OR** potassium & sodium phosphates  •  potassium chloride  •  potassium chloride  •  simethicone  •  [COMPLETED] Insert peripheral IV **AND** sodium chloride        Pneumonia due to COVID-19 virus    Chronic bilateral low back pain    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     Covid 19 PNA with Acute Hypoxic Respiratory Failure:   -She has completed a 5-day course of remdesivir  -She was given twice daily dexamethasone now once daily  -Monitor inflammatory markers  -CT angio negative for pulmonary embolism several days ago  -Hypoxemia slowly improving  -Negative pro-Guero, no antibiotics for now  -Continue supportive care. Wean oxygen as tolerated.       Chronic low back pain:   -Gabapentin 200 nightly started.  She has Norco as needed for pain.   -Continue lidocaine patch and Flexeril     Mucositis  -She wants to try nystatin swish and spit     Elevated LFTs  - mild and likely due to COVID.      Depression and anxiety  -She is dealing with pretty significant situational depression and anxiety on.  I do believe the social isolation is probably starting to get to the patient some.  -Anxiety medication.  With Xanax    Factor V Leiden: Consideration for low-dose anticoagulation for next 6 weeks, discussed risk  benefits with the patient  Asthma: No bronchospasm currently.  Bradycardia: No heart block on ekg. continue to monitor  Constipation: Resolved.  Continue bowel regimen.       DVT PPX: Lovenox 40      Montrell Day MD  21  21:44 EDT        Electronically signed by Montrell Day MD at 21 9386     Mack Maria MD at 21 1602                                      Mack Maria MD                          [phone]      Patient ID:    Name:  Claudia Price    MRN:  [id number]    1975   46 y.o.  female            Patient Care Team:  Malika Magana CRNP as PCP - General (Family Medicine)    CC/ Reason for visit: Acute respiratory failure, acute COVID-19 pneumonia, acute back pain with sciatica    Subjective:   NAEON, improving oxygenation. Denies F/C, CP, sputum production.   Nervous and some anxiety. Still feels very [place] though.     ROS: Denies any subjective fevers, syncope or presyncopal events, new neurological deficits, nausea or vomiting currently    Objective     Vital Signs past 24hrs    BP range: BP: (109-122)/(72-86) 111/72  Pulse range: Heart Rate:  [] 94  Resp rate range: Resp:  [18-20] 20  Temp range: Temp (24hrs), Av.5 °F (36.4 °C), Min:96.5 °F (35.8 °C), Max:98.1 °F (36.7 °C)      Ventilator/Non-Invasive Ventilation Settings (From admission, onward)            None          Device (Oxygen Therapy): nasal cannula       65.8 kg (145 lb); Body mass index is 24.13 kg/m².      Intake/Output Summary (Last 24 hours) at 2021 1602  Last data filed at 2021 1357  Gross per 24 hour   Intake 600 ml   Output 1200 ml   Net -600 ml       PHYSICAL EXAM   Physical Exam  Vitals and nursing note reviewed.   Constitutional:       General: She is not in acute distress.     Appearance: Normal appearance.   HENT:      Head: Normocephalic and atraumatic.      Nose: Nose normal. No congestion.      Mouth/Throat:      Mouth: Mucous membranes are moist.      Pharynx:  Oropharynx is clear.   Eyes:      General: No scleral icterus.     Extraocular Movements: Extraocular movements intact.   Cardiovascular:      Rate and Rhythm: Normal rate and regular rhythm.      Pulses: Normal pulses.   Pulmonary:      Effort: Pulmonary effort is normal.      Breath sounds: Normal breath sounds.   Abdominal:      General: Abdomen is flat. There is no distension.      Palpations: Abdomen is soft.      Tenderness: There is no abdominal tenderness.   Musculoskeletal:         General: Normal range of motion.      Cervical back: Normal range of motion and neck supple.      Right lower leg: No edema.      Left lower leg: No edema.   Skin:     General: Skin is warm and dry.   Neurological:      General: No focal deficit present.      Mental Status: She is alert and oriented to person, place, and time.         PPE recommended per [facility] infectious disease Isolation protocol for the current clinical scenario(as mentioned below) was followed.     Scheduled meds:  budesonide-formoterol, 2 puff, Inhalation, BID - RT  cetirizine, 10 mg, Oral, Daily  dexamethasone, 6 mg, Oral, Daily With Breakfast  enoxaparin, 40 mg, Subcutaneous, Nightly  fluticasone, 1 spray, Nasal, Daily  gabapentin, 200 mg, Oral, Nightly  lidocaine, 1 patch, Transdermal, Q24H  nystatin, 5 mL, Oral, 4x Daily  pantoprazole, 40 mg, Oral, Q AM  polyethylene glycol, 17 g, Oral, Daily  senna-docusate sodium, 2 tablet, Oral, BID  sodium chloride, 2 spray, Each Nare, BID        IV meds:                           Data Review:      Results from last 7 days   Lab Units 11/06/21  0438 11/05/21  0447 11/05/21  0446 11/05/21  0035 11/04/21  0551 11/03/21  0805 11/03/21  0805 11/02/21  0606 11/01/21  0459   SODIUM mmol/L  --  140  --   --  138  --  138   < > 137   POTASSIUM mmol/L  --  4.1  --  4.1 4.3  4.3   < > 3.4*   < > 4.2   CHLORIDE mmol/L  --  101  --   --  104  --  99   < > 101   CO2 mmol/L  --  26.9  --   --  24.3  --  26.1   < > 24.4    BUN mg/dL  --  15  --   --  12  --  14   < > 16   CREATININE mg/dL 0.52* 0.54*  --   --  0.51*   < > 0.63   < > 0.66   CALCIUM mg/dL  --  9.3  --   --  8.9  --  9.1   < > 8.8   BILIRUBIN mg/dL  --  0.2  --   --  0.3  --  0.9   < > 0.4   ALK PHOS U/L  --  84  --   --  76  --  85   < > 73   ALT (SGPT) U/L  --  110*  --   --  54*  --  63*   < > 92*   AST (SGOT) U/L  --  57*  --   --  33*  --  31   < > 58*   GLUCOSE mg/dL  --  96  --   --  83  --  66   < > 109*   WBC 10*3/mm3 12.92*  --  12.17*  --  13.67*   < > 16.51*   < >  --    HEMOGLOBIN g/dL 11.3*  --  12.1  --  11.7*   < > 14.3   < >  --    PLATELETS 10*3/mm3 326  --  316  --  324   < > 359   < >  --    PROBNP pg/mL  --   --   --   --  66.2  --   --   --   --    PROCALCITONIN ng/mL  --  0.15  --   --   --   --  0.07  --  0.04    < > = values in this interval not displayed.       Lab Results   Component Value Date    CALCIUM 9.3 11/05/2021    PHOS 3.6 11/05/2021           COVID LABS:  Results From Last 14 Days   Lab Units 11/06/21  0438 11/05/21  0447 11/04/21  0551 11/03/21  0805 11/03/21  0805 11/02/21  0606 11/01/21  0459 11/01/21  0458 10/31/21  0502 10/30/21  0836 10/30/21  0718 10/29/21  0622 10/29/21  0622 10/28/21  0611 10/28/21  0611 10/25/21  1347 10/23/21  1744   PROBNP pg/mL  --   --  66.2  --   --   --   --   --   --   --   --   --  576.6*  --   --   --  60.7   CRP mg/dL  --   --   --   --   --   --   --   --   --   --  <0.30  --  0.40  --  0.51*   < >  --    D DIMER QUANT MCGFEU/mL  --  0.88*  --   --  0.63*  --   --  0.40  --    < >  --   --   --   --  <0.27   < > 0.38   FERRITIN ng/mL 593.00* 765.00* 772.00*   < > 745.00*   < > 591.00*  --    < >   < > 639.00*   < > 782.00*   < > 592.00*   < >  --    PROCALCITONIN ng/mL  --  0.15  --   --  0.07  --  0.04  --   --    < > 0.05  --   --    < > 0.04   < >  --    TROPONIN T ng/mL  --   --   --   --   --   --   --   --   --   --   --   --   --   --   --   --  <0.010    < > = values in this interval not  displayed.              Results Review:    I have reviewed the relevant laboratory results and independently reviewed the chest imaging from this hospitalization including the available echocardiogram reports personally and summarized it if/ when appropriate below    Assessment    # Acute hypoxic respiratory failure; severe, currently on 2L NC   - CTA neg for PE  # Acute COVID-19 pneumonia- Unvaccinated patient  # Elevated inflammatory markers  # Mild hepatitis  # Mild h/o Asthma        RECOMMENDATIONS:  - Continue with dex 6mg, ok to stop after the 5 doses  - ok to cont symbicort, would discharge her with it, as does have some history of asthma  - self prone and OOB as much as possible encourage IS   - cont DVT prophylaxis    Ok for D/c on oxygen per pulmonary perspective. Will follow peripherally.        Mack Maria MD  11/6/2021    Electronically signed by Mack Maria MD at 11/06/21 1619       Consult Notes (last 72 hours)  Notes from 11/05/21 1610 through 11/08/21 1610   No notes of this type exist for this encounter.            Caesar Guillen MD   Physician   Pulmonology   Progress Notes      Signed   Date of Service:  11/04/21 1557   Creation Time:  11/04/21 1557              Signed        Expand All Collapse All[]Expand All by Default        Show:Clear all  [x]Manual[x]Template[x]Copied    Added by:  [x]Caesar Guillen MD      []Travis for details                                                                                                                                                                                                                                        Caesar Guillen MD                                                                                                                                                              [phone]        Patient ID:     Name:  Claudia Price     MRN:  [id number]     1975   46 y.o.  female                                                       Patient Care Team:  Malika Magana CRNP as PCP - General (Family Medicine)     CC/ Reason for visit: Acute respiratory failure, acute COVID-19 pneumonia, acute back pain with sciatica     Subjective: Pt seen and examined this AM.  Continues to require up to 4 L nasal cannula with sats in the high 80s.  Improved anxiety and restlessness in comparison to yesterday but overall anxious. Trying to get comfortable with appropriate position with regards to back pain and spasms.  Chest x-ray ordered by primary.  Discussed with Dr. Rose and appreciate input     ROS: Denies any subjective fevers, syncope or presyncopal events, new neurological deficits, nausea or vomiting currently        Objective         Vital Signs past 24hrs     BP range: BP: (104-115)/(68-77) 115/77  Pulse range: Heart Rate:  [] 96  Resp rate range: Resp:  [18] 18  Temp range: Temp (24hrs), Av.3 °F (36.8 °C), Min:97.8 °F (36.6 °C), Max:98.6 °F (37 °C)            Ventilator/Non-Invasive Ventilation Settings (From admission, onward)                 None             Device (Oxygen Therapy): nasal cannula        65.8 kg (145 lb); Body mass index is 24.13 kg/m².        Intake/Output Summary (Last 24 hours) at 2021 1558  Last data filed at 2021 0600      Gross per 24 hour   Intake --   Output 1900 ml   Net -1900 ml         PHYSICAL EXAM   Constitutional: Middle aged pt in bed, No acute respiratory distress, + accessory muscle use  Head: - NCAT  Eyes: No pallor.  Anicteric sclerae, EOMI.  ENMT:  Mallampati 3, no oral thrush. Moist MM.   NECK: Trachea midline, No thyromegaly, no palpable cervical lymphadenopathy  Heart: RRR, no murmur. No pedal edema   Lungs: SPIKE +, No wheezes.  Occasional crackles heard    Abdomen: Soft. No tenderness, guarding or rigidity. No palpable masses  Extremities: Extremities warm and well perfused. No cyanosis/ clubbing  Neuro: Conscious, answers appropriately, no gross  focal neuro deficits  Psych: Mood and affect -anxious/tearful     PPE recommended per [facility] infectious disease Isolation protocol for the current clinical scenario(as mentioned below) was followed.      Scheduled meds:       arformoterol, 15 mcg, Nebulization, BID - RT  cetirizine, 10 mg, Oral, Daily  dexamethasone, 6 mg, Oral, Daily With Breakfast  enoxaparin, 40 mg, Subcutaneous, Nightly  fluticasone, 1 spray, Nasal, Daily  gabapentin, 200 mg, Oral, Nightly  lidocaine, 1 patch, Transdermal, Q24H  nystatin, 5 mL, Oral, 4x Daily  pantoprazole, 40 mg, Oral, Q AM  polyethylene glycol, 17 g, Oral, Daily  senna-docusate sodium, 2 tablet, Oral, BID  sodium chloride, 2 spray, Each Nare, BID           IV meds:                            Data Review:                     Results from last 7 days   Lab Units 11/04/21  0551 11/03/21  0805 11/02/21  0606 11/01/21  0459 10/31/21  0502 10/30/21  0718 10/30/21  0718 10/29/21  0622 10/29/21  0622   SODIUM mmol/L 138 138 139 137  --    < > 140   < > 140   POTASSIUM mmol/L 4.3  4.3 3.4* 4.2 4.2  --    < > 4.5   < > 4.5   CHLORIDE mmol/L 104 99 102 101  --    < > 103   < > 104   CO2 mmol/L 24.3 26.1 25.7 24.4  --    < > 24.1   < > 24.8   BUN mg/dL 12 14 17 16  --    < > 11   < > 13   CREATININE mg/dL 0.51* 0.63 0.61 0.66   < >   < > 0.48*   < > 0.53*   CALCIUM mg/dL 8.9 9.1 9.1 8.8  --    < > 8.8   < > 9.1   BILIRUBIN mg/dL 0.3 0.9 0.4 0.4   < >   < > 0.2   < > 0.2   ALK PHOS U/L 76 85 85 73   < >   < > 65   < > 75   ALT (SGPT) U/L 54* 63* 86* 92*   < >   < > 59*   < > 57*   AST (SGOT) U/L 33* 31 38* 58*   < >   < > 52*   < > 49*   GLUCOSE mg/dL 83 66 95 109*  --    < > 107*   < > 128*   WBC 10*3/mm3 13.67* 16.51* 13.95*  --   --    < > 10.24   < > 13.34*   HEMOGLOBIN g/dL 11.7* 14.3 13.3  --   --    < > 11.9*   < > 13.1   PLATELETS 10*3/mm3 324 359 421  --   --    < > 320   < > 322   PROBNP pg/mL 66.2  --   --   --   --   --   --   --  576.6*   PROCALCITONIN ng/mL  --   0.07  --  0.04  --   --  0.05  --   --     < > = values in this interval not displayed.               Lab Results   Component Value Date     CALCIUM 8.9 11/04/2021     PHOS 4.2 11/02/2021             COVID LABS:                      Results From Last 14 Days   Lab Units 11/04/21  0551 11/03/21  0805 11/02/21  0606 11/01/21  0459 11/01/21  0459 11/01/21  0458 10/31/21  0502 10/30/21  0836 10/30/21  0718 10/29/21  0622 10/29/21  0622 10/28/21  0611 10/28/21  0611 10/25/21  1347 10/23/21  1744    PROBNP pg/mL 66.2  --   --   --   --   --   --   --   --   --  576.6*  --   --   --  60.7    CRP mg/dL  --   --   --   --   --   --   --   --  <0.30  --  0.40  --  0.51*   < >  --     D DIMER QUANT MCGFEU/mL  --  0.63*  --   --   --  0.40  --  0.38  --   --   --    < > <0.27   < > 0.38    FERRITIN ng/mL 772.00* 745.00* 570.00*   < > 591.00*  --    < >  --  639.00*   < > 782.00*   < > 592.00*   < >  --     PROCALCITONIN ng/mL  --  0.07  --   --  0.04  --   --   --  0.05  --   --    < > 0.04   < >  --     TROPONIN T ng/mL  --   --   --   --   --   --   --   --   --   --   --   --   --   --  <0.010     < > = values in this interval not displayed.                          Results Review:    I have reviewed the relevant laboratory results and independently reviewed the chest imaging from this hospitalization including the available echocardiogram reports personally and summarized it if/ when appropriate below           Assessment                 Acute hypoxic respiratory failure; severe  Acute COVID-19 pneumonia- Unvaccinated patient  Elevated inflammatory markers  Mild hepatitis  Sciatica     RECOMMENDATIONS:  Patient is still with 3 - 4 L nasal cannula. Sats in the high 80s with any conversation.  Inflammatory markers are still up today. CT angiogram ruled out pulmonary embolism.  Repeat chest x-ray pending.  Continue with steroids  Continue with bronchodilators.  We will start Symbicort to help with home.  Does not want to be on  duo nebs due to anxiety issues.  Back spasm/pain and anxiety playing a role in recovery  Continue trend inflammatory markers  OOB as tolerated     DVT prophylaxis      Discussed with . Will reassess in AM. Hopefully looks better to go.  She will need to follow-up with primary care in a week or 2 to evaluate for need of supplemental oxygen.     Caesar Guillen MD  11/4/2021

## 2021-11-09 ENCOUNTER — READMISSION MANAGEMENT (OUTPATIENT)
Dept: CALL CENTER | Facility: HOSPITAL | Age: 46
End: 2021-11-09

## 2021-11-09 NOTE — OUTREACH NOTE
COVID-19 Week 1 Survey      Responses   [facility] patient discharged from? [place]   Does the patient have one of the following disease processes/diagnoses(primary or secondary)? COVID-19   COVID-19 underlying condition? None   Call Number Call 1   Week 1 Call successful? No   Discharge diagnosis Acute hypoxic resp failure d/t COVID-19 PNA, superficial thrombophlebitis          Rachel Benson RN

## 2021-11-09 NOTE — CASE MANAGEMENT/SOCIAL WORK
Case Management Discharge Note      Final Note: Patient DC'd home with O2 from [place].         Selected Continued Care - Discharged on 11/8/2021 Admission date: 10/25/2021 - Discharge disposition: Home or Self Care    Destination    No services have been selected for the patient.              Durable Medical Equipment    No services have been selected for the patient.              Dialysis/Infusion    No services have been selected for the patient.              Home Medical Care    No services have been selected for the patient.              Therapy    No services have been selected for the patient.              Community Resources    No services have been selected for the patient.              Community & DME    No services have been selected for the patient.                  Transportation Services  Private: Car    Final Discharge Disposition Code: 01 - home or self-care

## 2021-11-09 NOTE — OUTREACH NOTE
Prep Survey      Responses   [facility] patient discharged from? [place]   Is LACE score < 7 ? No   Emergency Room discharge w/ pulse ox? No   Eligibility Readm Mgmt   Discharge diagnosis Acute hypoxic resp failure d/t COVID-19 PNA, superficial thrombophlebitis   Does the patient have one of the following disease processes/diagnoses(primary or secondary)? COVID-19   Does the patient have Home health ordered? No   Is there a DME ordered? Yes   What DME was ordered? O2 from [facility]   Prep survey completed? Yes          Racheal Marie RN

## 2021-11-10 ENCOUNTER — READMISSION MANAGEMENT (OUTPATIENT)
Dept: CALL CENTER | Facility: HOSPITAL | Age: 46
End: 2021-11-10

## 2021-11-10 NOTE — OUTREACH NOTE
COVID-19 Week 1 Survey      Responses   [facility] patient discharged from? [place]   Does the patient have one of the following disease processes/diagnoses(primary or secondary)? COVID-19   COVID-19 underlying condition? None   Call Number Call 2   Week 1 Call successful? No   Discharge diagnosis Acute hypoxic resp failure d/t COVID-19 PNA, superficial thrombophlebitis          KAMINI STARKEY RN

## 2021-11-11 ENCOUNTER — READMISSION MANAGEMENT (OUTPATIENT)
Dept: CALL CENTER | Facility: HOSPITAL | Age: 46
End: 2021-11-11

## 2021-11-11 NOTE — OUTREACH NOTE
COVID-19 Week 1 Survey      Responses   [facility] patient discharged from? [place]   Does the patient have one of the following disease processes/diagnoses(primary or secondary)? COVID-19   COVID-19 underlying condition? None   Call Number Call 3   Week 1 Call successful? 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 Yes   List who call center can speak with Jr, spouse   Person spoke with today (if not patient) and relationship patient   Meds reviewed with patient/caregiver? Yes   Does the patient have all medications ordered at discharge? Yes   Is the patient taking all medications as directed (includes completed medication regime)? Yes   Does the patient have a primary care provider?  Yes   Comments regarding PCP ELVER Jiménez. Appt Monday 11/15/21    Does the patient have an appointment with their PCP or specialist within 7 days of discharge? Yes   Has the patient kept scheduled appointments due by today? N/A   Has home health visited the patient within 72 hours of discharge? N/A   What DME was ordered? O2 from [facility]   Has all DME been delivered? Yes   Psychosocial issues? No   Comments Patient going to outpt PT at [facility]. Patient was previously going to PT for sciatic type pain but is going to get order to address post COVID type therapy.    Did the patient receive a copy of their discharge instructions? Yes   Did the patient receive a copy of COVID-19 specific instructions? Yes   Nursing interventions Reviewed instructions with patient   What is the patient's perception of their health status since discharge? Improving   Does the patient have any of the following symptoms? Cough,  Shortness of breath  [Altered taste, but not lost. Patient still with issues with sleep and  having high heart rate with any activity. ]   Nursing Interventions Nurse provided patient education   Pulse Ox monitoring Intermittent   Pulse Ox device source Patient   O2 Sat comments 95-96% with 2L. Has not dropped below 88% with activity, rebounding into 90's with rest.    O2 Sat: education provided Sat levels,  Monitoring frequency,  When to seek care   Is the patient/caregiver able to teach back steps to recovery at home? Set small, achievable goals for return to baseline health,  Rest and rebuild strength, gradually increase activity,  Eat a well-balance diet   If the patient is a current smoker, are they able to teach back resources for cessation? Not a smoker   Is the patient/caregiver able to teach back the hierarchy of who to call/visit for symptoms/problems? PCP, Specialist, Home health nurse, Urgent Care, ED, 911 Yes   COVID-19 call completed? Yes   Wrap up additional comments Patient no longer on home quarantine. Other family members in home have had COVID recently also. Patient aware of replacing toothbrush, disposing of personal care products that touch eyes, nose and mouth that she was using when became ill with virus. Patient without further questions today.           Sophie Jones RN

## 2021-11-15 ENCOUNTER — READMISSION MANAGEMENT (OUTPATIENT)
Dept: CALL CENTER | Facility: HOSPITAL | Age: 46
End: 2021-11-15

## 2021-11-15 NOTE — OUTREACH NOTE
COVID-19 Week 2 Survey      Responses   [facility] patient discharged from? [place]   Does the patient have one of the following disease processes/diagnoses(primary or secondary)? COVID-19   COVID-19 underlying condition? None   Call Number Call 1   COVID-19 Week 2: Call 1 attempt successful? Yes   Call start time 1045   Call end time 1054   Discharge diagnosis Acute hypoxic resp failure d/t COVID-19 PNA, superficial thrombophlebitis   Person spoke with today (if not patient) and relationship patient   Is the patient having any side effects they believe may be caused by any medication additions or changes? Yes   Side effects comments  Jittery from steriods, can't sleep, incresased HR   Medication comments Tapering steriods has 5 days left   Has the patient kept scheduled appointments due by today? N/A   Comments PCP today 11/15/2021   Has home health visited the patient within 72 hours of discharge? N/A   What is the patient's perception of their health status since discharge? Improving   Does the patient have any of the following symptoms? Shortness of breath   Nursing Interventions Nurse provided patient education   Pulse Ox monitoring Intermittent   Pulse Ox device source Patient   O2 Sat comments O2 at 2l/min   O2 Sat: education provided Sat levels,  Monitoring frequency,  When to seek care   COVID-19 call completed? Yes          Julia Mcdermott RN

## 2021-11-22 ENCOUNTER — READMISSION MANAGEMENT (OUTPATIENT)
Dept: CALL CENTER | Facility: HOSPITAL | Age: 46
End: 2021-11-22

## 2021-11-22 NOTE — OUTREACH NOTE
COVID-19 Week 3 Survey      Responses   [facility] patient discharged from? [place]   Does the patient have one of the following disease processes/diagnoses(primary or secondary)? COVID-19   COVID-19 underlying condition? None   Call Number Call 1   COVID-19 Week 3: Call 1 attempt successful? Yes   Call start time 1156   Call end time 1225   Discharge diagnosis Acute hypoxic resp failure d/t COVID-19 PNA, superficial thrombophlebitis   Person spoke with today (if not patient) and relationship patient   Meds reviewed with patient/caregiver? Yes   Is the patient having any side effects they believe may be caused by any medication additions or changes? No   Does the patient have all medications ordered at discharge? Yes   Is the patient taking all medications as directed (includes completed medication regime)? Yes   Does the patient have a primary care provider?  Yes   Does the patient have an appointment with their PCP or specialist within 7 days of discharge? Yes   Has the patient kept scheduled appointments due by today? Yes   Psychosocial issues? No   What is the patient's perception of their health status since discharge? Improving   Does the patient have any of the following symptoms? Shortness of breath   Nursing Interventions Nurse provided patient education   Pulse Ox monitoring Intermittent   Pulse Ox device source Patient   O2 Sat comments 96% 1LO2 rest,  98% 1LO2 exertion   Is the patient/caregiver able to teach back steps to recovery at home? Rest and rebuild strength, gradually increase activity,  Eat a well-balance diet   COVID-19 call completed? 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 Pt states her HR was 120-130s when on steriods, but now has been off steriods for 2 days and HR is in the 90s. Pt reports her normal HR is 60.  Pt is slowly titrating herself off the O2 and is currently using 1LO2. No questions/concerns.           Malaika March RN

## 2023-10-06 ENCOUNTER — OFFICE VISIT (OUTPATIENT)
Dept: FAMILY MEDICINE CLINIC | Facility: CLINIC | Age: 48
End: 2023-10-06
Payer: COMMERCIAL

## 2023-10-06 VITALS
HEIGHT: 65 IN | HEART RATE: 68 BPM | WEIGHT: 159.6 LBS | BODY MASS INDEX: 26.59 KG/M2 | OXYGEN SATURATION: 100 % | DIASTOLIC BLOOD PRESSURE: 81 MMHG | SYSTOLIC BLOOD PRESSURE: 127 MMHG

## 2023-10-06 DIAGNOSIS — J30.1 SEASONAL ALLERGIC RHINITIS DUE TO POLLEN: ICD-10-CM

## 2023-10-06 DIAGNOSIS — Z00.00 WELL ADULT EXAM: Primary | ICD-10-CM

## 2023-10-06 DIAGNOSIS — R53.83 FATIGUE, UNSPECIFIED TYPE: ICD-10-CM

## 2023-10-06 DIAGNOSIS — Z11.59 NEED FOR HEPATITIS C SCREENING TEST: ICD-10-CM

## 2023-10-06 DIAGNOSIS — Z13.220 LIPID SCREENING: ICD-10-CM

## 2023-10-06 DIAGNOSIS — Z12.11 SCREENING FOR COLON CANCER: ICD-10-CM

## 2023-10-06 RX ORDER — ALBUTEROL SULFATE 90 UG/1
AEROSOL, METERED RESPIRATORY (INHALATION)
COMMUNITY
Start: 2023-09-14

## 2023-10-06 RX ORDER — FOLIC ACID 0.8 MG
TABLET ORAL
COMMUNITY
Start: 2023-10-03

## 2023-10-06 RX ORDER — TRIAMCINOLONE ACETONIDE 55 UG/1
SPRAY, METERED NASAL EVERY 24 HOURS
COMMUNITY

## 2023-10-06 NOTE — PATIENT INSTRUCTIONS
Continue your current medications.   Continue at least 150 minutes of aerobic exercise weekly.  Continue yearly mammogram and routine pap smear with gynecologist.  Colonoscopy--order placed.  You had lab tests today. You should receive a call or my chart message with your test results. If you have not received your results in the next 7-10 days, please contact the office.    You can try adding over the counter astelin as needed for allergies. This can be taken with current meds.

## 2023-10-06 NOTE — PROGRESS NOTES
Chief Complaint  Annual Exam    Subjective    History of Present Illness {  Problem List  Visit  Diagnosis   Encounters  Notes  Medications  Labs  Result Review Imaging  Media :23}     Claudia Price presents to [facility] for Annual Exam.  She is  and has 1 child--age 11. 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 She sees the gynecologist for her pap smear and mammogram and is scheduled for later this month. She has never had a colonoscopy. She exercises 5-7 days a week--aerobic and strength training. She has never been a smoker. She sees is up to date on dental and eye exams.     Their chronic medical conditions were reviewed and are stable unless noted otherwise below.    History of Present Illness     Social History     Socioeconomic History    Marital status:    Tobacco Use    Smoking status: Never    Smokeless tobacco: Never   Vaping Use    Vaping Use: Never used   Substance and Sexual Activity    Alcohol use: Yes     Alcohol/week: 2.0 standard drinks     Types: 2 Glasses of wine per week     Comment: social    Drug use: No    Sexual activity: Yes     Partners: Male     Birth control/protection: None        Review of Systems   Constitutional:  Positive for fatigue (intermittent).   HENT:  Positive for sinus pressure. Negative for ear pain.    Eyes:  Negative for pain and visual disturbance.   Respiratory:  Negative for cough and shortness of breath.    Cardiovascular:  Negative for chest pain and palpitations.   Gastrointestinal:  Negative for abdominal pain, constipation and diarrhea.   Genitourinary:  Negative for dysuria.   Musculoskeletal:  Negative for arthralgias and myalgias.   Skin:  Negative for color change and rash.   Allergic/Immunologic: Positive for environmental allergies (on zyrtec and nasacort).   Neurological:  Negative for dizziness and headaches (She has a history of migraines, but none since starting magnesium).   Hematological:   "Negative for adenopathy.   Psychiatric/Behavioral:  Positive for sleep disturbance (intermittent, uses hydroxyzine as needed). Negative for dysphoric mood. The patient is not nervous/anxious.       Objective       Vital Signs:   /81   Pulse 68   Ht 165.1 cm (65\")   Wt 72.4 kg (159 lb 9.6 oz)   SpO2 100%   BMI 26.56 kg/m²     Body mass index is 26.56 kg/m².     PHQ-9 Depression Screening  Little interest or pleasure in doing things? 0-->not at all   Feeling down, depressed, or hopeless? 0-->not at all   Trouble falling or staying asleep, or sleeping too much?     Feeling tired or having little energy?     Poor appetite or overeating?     Feeling bad about yourself - or that you are a failure or have let yourself or your family down?     Trouble concentrating on things, such as reading the newspaper or watching television?     Moving or speaking so slowly that other people could have noticed? Or the opposite - being so fidgety or restless that you have been moving around a lot more than usual?     Thoughts that you would be better off dead, or of hurting yourself in some way?     PHQ-9 Total Score 0   If you checked off any problems, how difficult have these problems made it for you to do your work, take care of things at home, or get along with other people?           Physical Exam  Constitutional:       General: She is not in acute distress.     Appearance: Normal appearance.   HENT:      Head: Normocephalic and atraumatic.      Nose: No rhinorrhea.      Mouth/Throat:      Mouth: Mucous membranes are moist.      Pharynx: No posterior oropharyngeal erythema.   Eyes:      Extraocular Movements: Extraocular movements intact.      Conjunctiva/sclera: Conjunctivae normal.   Cardiovascular:      Rate and Rhythm: Normal rate and regular rhythm.      Heart sounds: No murmur heard.  Pulmonary:      Effort: No respiratory distress.      Breath sounds: Normal breath sounds.   Abdominal:      General: There is no " distension.      Palpations: Abdomen is soft.      Tenderness: There is no abdominal tenderness.   Musculoskeletal:      Right lower leg: No edema.      Left lower leg: No edema.   Lymphadenopathy:      Cervical: No cervical adenopathy.   Skin:     General: Skin is warm.      Findings: No rash.   Neurological:      General: No focal deficit present.      Mental Status: She is alert.   Psychiatric:         Behavior: Behavior normal.        Result Review  Data Reviewed:{ Labs  Result Review  Imaging  Med Tab  Media :23}                Assessment and Plan {CC Problem List  Visit Diagnosis  ROS  Review (Popup)  Health Maintenance  Quality  BestPractice  Medications  SmartSets  SnapShot Encounters  Media :23}   Diagnoses and all orders for this visit:    1. Well adult exam (Primary)    2. Seasonal allergic rhinitis due to pollen  -     CBC & Differential  -     Comprehensive Metabolic Panel  -     Lipid Panel  -     TSH Rfx On Abnormal To Free T4    3. Lipid screening  -     Lipid Panel    4. Screening for colon cancer  -     Ambulatory Referral For Screening Colonoscopy    5. Need for hepatitis C screening test  -     Hepatitis C Antibody    6. Fatigue, unspecified type  -     CBC & Differential  -     TSH Rfx On Abnormal To Free T4    Other orders  -     Tdap Vaccine Greater Than or Equal To 8yo IM        Patient Instructions   Continue your current medications.   Continue at least 150 minutes of aerobic exercise weekly.  Continue yearly mammogram and routine pap smear with gynecologist.  Colonoscopy--order placed.  You had lab tests today. You should receive a call or my chart message with your test results. If you have not received your results in the next 7-10 days, please contact the office.    You can try adding over the counter astelin as needed for allergies. This can be taken with current meds.     Routine health maintenance, immunizations and cancer screenings were discussed and  encouraged.  Plan flu vaccine through work.    Patient was given instructions and counseling regarding her condition or for health maintenance advice on the AVS.       Return in about 1 year (around 10/6/2024) for Annual.    Galina Blankenship MD

## 2023-10-07 LAB
ALBUMIN SERPL-MCNC: 4.9 G/DL (ref 3.9–4.9)
ALBUMIN/GLOB SERPL: 2.1 {RATIO} (ref 1.2–2.2)
ALP SERPL-CCNC: 72 IU/L (ref 44–121)
ALT SERPL-CCNC: 21 IU/L (ref 0–32)
AST SERPL-CCNC: 30 IU/L (ref 0–40)
BASOPHILS # BLD AUTO: 0.1 X10E3/UL (ref 0–0.2)
BASOPHILS NFR BLD AUTO: 1 %
BILIRUB SERPL-MCNC: <0.2 MG/DL (ref 0–1.2)
BUN SERPL-MCNC: 11 MG/DL (ref 6–24)
BUN/CREAT SERPL: 17 (ref 9–23)
CALCIUM SERPL-MCNC: 10.1 MG/DL (ref 8.7–10.2)
CHLORIDE SERPL-SCNC: 101 MMOL/L (ref 96–106)
CHOLEST SERPL-MCNC: 237 MG/DL (ref 100–199)
CO2 SERPL-SCNC: 19 MMOL/L (ref 20–29)
CREAT SERPL-MCNC: 0.64 MG/DL (ref 0.57–1)
EGFRCR SERPLBLD CKD-EPI 2021: 108 ML/MIN/1.73
EOSINOPHIL # BLD AUTO: 0.2 X10E3/UL (ref 0–0.4)
EOSINOPHIL NFR BLD AUTO: 3 %
ERYTHROCYTE [DISTWIDTH] IN BLOOD BY AUTOMATED COUNT: 13.3 % (ref 11.7–15.4)
GLOBULIN SER CALC-MCNC: 2.3 G/DL (ref 1.5–4.5)
GLUCOSE SERPL-MCNC: 82 MG/DL (ref 70–99)
HCT VFR BLD AUTO: 46 % (ref 34–46.6)
HCV IGG SERPL QL IA: NON REACTIVE
HDLC SERPL-MCNC: 65 MG/DL
HGB BLD-MCNC: 14.9 G/DL (ref 11.1–15.9)
IMM GRANULOCYTES # BLD AUTO: 0 X10E3/UL (ref 0–0.1)
IMM GRANULOCYTES NFR BLD AUTO: 1 %
LDLC SERPL CALC-MCNC: 152 MG/DL (ref 0–99)
LYMPHOCYTES # BLD AUTO: 1.3 X10E3/UL (ref 0.7–3.1)
LYMPHOCYTES NFR BLD AUTO: 23 %
MCH RBC QN AUTO: 27.6 PG (ref 26.6–33)
MCHC RBC AUTO-ENTMCNC: 32.4 G/DL (ref 31.5–35.7)
MCV RBC AUTO: 85 FL (ref 79–97)
MONOCYTES # BLD AUTO: 0.4 X10E3/UL (ref 0.1–0.9)
MONOCYTES NFR BLD AUTO: 7 %
NEUTROPHILS # BLD AUTO: 3.7 X10E3/UL (ref 1.4–7)
NEUTROPHILS NFR BLD AUTO: 65 %
PLATELET # BLD AUTO: 173 X10E3/UL (ref 150–450)
POTASSIUM SERPL-SCNC: 4.8 MMOL/L (ref 3.5–5.2)
PROT SERPL-MCNC: 7.2 G/DL (ref 6–8.5)
RBC # BLD AUTO: 5.4 X10E6/UL (ref 3.77–5.28)
SODIUM SERPL-SCNC: 141 MMOL/L (ref 134–144)
TRIGL SERPL-MCNC: 111 MG/DL (ref 0–149)
TSH SERPL DL<=0.005 MIU/L-ACNC: 0.87 UIU/ML (ref 0.45–4.5)
VLDLC SERPL CALC-MCNC: 20 MG/DL (ref 5–40)
WBC # BLD AUTO: 5.6 X10E3/UL (ref 3.4–10.8)

## 2023-12-01 DIAGNOSIS — Z12.31 SCREENING MAMMOGRAM, ENCOUNTER FOR: Primary | ICD-10-CM

## 2023-12-16 ENCOUNTER — HOSPITAL ENCOUNTER (OUTPATIENT)
Dept: MAMMOGRAPHY | Facility: HOSPITAL | Age: 48
Discharge: HOME | End: 2023-12-16
Admitting: FAMILY MEDICINE
Payer: COMMERCIAL

## 2023-12-16 DIAGNOSIS — Z12.31 SCREENING MAMMOGRAM, ENCOUNTER FOR: ICD-10-CM

## 2023-12-16 PROCEDURE — 77063 BREAST TOMOSYNTHESIS BI: CPT

## 2023-12-16 PROCEDURE — 77067 SCR MAMMO BI INCL CAD: CPT

## 2024-02-05 ENCOUNTER — TELEPHONE (OUTPATIENT)
Dept: SURGERY | Facility: CLINIC | Age: 49
End: 2024-02-05
Payer: COMMERCIAL

## 2024-02-05 NOTE — TELEPHONE ENCOUNTER
Message was left on patients voicemail to call Mariana at the office.  I was calling to schedule a colonoscopy.

## 2024-02-07 ENCOUNTER — PREP FOR SURGERY (OUTPATIENT)
Dept: OTHER | Facility: HOSPITAL | Age: 49
End: 2024-02-07
Payer: COMMERCIAL

## 2024-02-07 DIAGNOSIS — Z12.11 SCREENING FOR COLON CANCER: Primary | ICD-10-CM

## 2024-02-08 PROBLEM — Z12.11 SCREENING FOR COLON CANCER: Status: ACTIVE | Noted: 2024-02-07

## 2024-02-14 ENCOUNTER — HOSPITAL ENCOUNTER (EMERGENCY)
Facility: HOSPITAL | Age: 49
Discharge: HOME | End: 2024-02-14
Attending: EMERGENCY MEDICINE | Admitting: EMERGENCY MEDICINE
Payer: COMMERCIAL

## 2024-02-14 VITALS
SYSTOLIC BLOOD PRESSURE: 128 MMHG | RESPIRATION RATE: 16 BRPM | TEMPERATURE: 97.5 F | DIASTOLIC BLOOD PRESSURE: 83 MMHG | OXYGEN SATURATION: 100 % | HEART RATE: 63 BPM

## 2024-02-14 DIAGNOSIS — Z57.8 EMPLOYEE EXPOSURE TO BODY FLUIDS: Primary | ICD-10-CM

## 2024-02-14 PROCEDURE — 99282 EMERGENCY DEPT VISIT SF MDM: CPT

## 2024-02-14 SDOH — HEALTH STABILITY - PHYSICAL HEALTH: OCCUPATIONAL EXPOSURE TO OTHER RISK FACTORS: Z57.8

## 2024-02-14 NOTE — ED PROVIDER NOTES
EMERGENCY DEPARTMENT MD ATTESTATION NOTE    SHARED VISIT: This visit was performed by BOTH a physician and an APC. The substantive portion of the medical decision making was performed by this attesting physician who made or approved the management plan and takes responsibility for patient management. All studies documented in the APC note (if performed) were independently interpreted by me.    The UMM and I have discussed this patient's history, physical exam, MDM, and treatment plan.  I have reviewed the documentation and personally had a face to face interaction with the patient. The attached note describes my personal findings.        Room Number:  S01/01  PCP: Galina Blankenship MD  Independent Historians: Patient    HPI:  A complete HPI/ROS/PMH/PSH/SH/FH are unobtainable due to: None    Chronic or social conditions impacting patient care (Social Determinants of Health): None      Context: Claudia Price is a 48 y.o. female with a medical history of Raynaud's disease, asthma who presents to the ED c/o acute exposure.  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  She reports that she was working with the patient when the respiratory therapist disconnected the ventilator and the patient coughed and sputum splashed into her face.  She states that she has learned that the patient's testing for HIV and hepatitis is negative after arrival here.  She denies any other exposure.        Review of prior external notes (non-ED) -and- Review of prior external test results outside of this encounter:  Laboratory evaluation 10/6/2023 shows normal CMP, normal CBC.  She had normal TSH on the same date.    Prescription drug monitoring program review:           PHYSICAL EXAM    I have reviewed the triage vital signs and nursing notes.    ED Triage Vitals [02/14/24 1048]   Temp Heart Rate Resp BP SpO2   97.5 °F (36.4 °C) 75 16 -- 100 %      Temp src Heart Rate Source Patient Position BP Location FiO2 (%)   -- -- --  -- --       Physical Exam  GENERAL: Awake, alert, no acute distress  SKIN: Warm, dry  HENT: Normocephalic, atraumatic  EYES: no scleral icterus  CV: regular rhythm, regular rate  RESPIRATORY: normal effort, lungs clear  ABDOMEN: soft, nontender, nondistended  MUSCULOSKELETAL: no deformity  NEURO: alert, moves all extremities, follows commands      NIH:                                                             MEDICATIONS GIVEN IN ER  Medications - No data to display      ORDERS PLACED DURING THIS VISIT:  No orders of the defined types were placed in this encounter.        PROCEDURES  Procedures            PROGRESS, DATA ANALYSIS, CONSULTS, AND MEDICAL DECISION MAKING  All labs have been independently interpreted by me.  All radiology studies have been reviewed by me. All EKG's have been independently viewed and interpreted by me.  Discussion below represents my analysis of pertinent findings related to patient's condition, differential diagnosis, treatment plan and final disposition.    Differential diagnosis includes but is not limited to body fluid exposure, HIV exposure, hepatitis exposure.    Clinical Scores:                           ED Course as of 02/14/24 1157   Wed Feb 14, 2024   1138 Discussed case with [facility] who states patient does not need any additional labs in ER. They recommend following up with [facility] for concern regarding MRSA exposure. Patient in question had negative testing which they have discussed with patient. [DC]      ED Course User Index  [DC] Thea Garcia PA       MDM: The patient seems to have not had significant exposure given that the source patient is negative.  We will discuss with employee [facility] to ensure that no additional testing is necessary from their standpoint.  I believe she does not need any further testing now can be discharged home with [facility] follow-up.  There is no specific prophylaxis for MRSA.      COMPLEXITY OF CARE  Admission was considered  but after careful review of the patient's presentation, physical examination, diagnostic results, and response to treatment the patient may be safely discharged with outpatient follow-up.    Please note that portions of this document were completed with a voice recognition program.    Note Disclaimer: At [facility], we believe that sharing information builds trust and better relationships. You are receiving this note because you recently visited [facility]. It is possible you will see health information before a provider has talked with you about it. This kind of information can be easy to misunderstand. To help you fully understand what it means for your health, we urge you to discuss this note with your provider.         Hermann Fried MD  02/14/24 1720

## 2024-02-14 NOTE — ED NOTES
Patient to ER from work upstairs states that a ventilator was unhooked and sprayed her in the face   Patient has known MRSA

## 2024-02-14 NOTE — ED PROVIDER NOTES
EMERGENCY DEPARTMENT ENCOUNTER      PCP: Galina Blankenship MD  Patient Care Team:  Galina Blankenship MD as PCP - General (Family Medicine)   Independent Historians: Patient    HPI:  Chief Complaint: Body Fluid Exposure   A complete HPI/ROS/PMH/PSH/SH/FH are unobtainable due to: None    Chronic or social conditions impacting patient care (social determinants of health): None    Context: Claudia Price is a 48 y.o. female who presents to the ED c/o body fluid exposure.  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 She also reports tubing from vent which had been unhooked was pointed at her face. Pt has neck wound which tested positive for MRSA. She immediately washed eyes out at eye washing station x 5 minutes. Pt received call stating patient's lab testing was negative for HIV, hepatitis B,C.      Review of prior external notes and/or external test results outside of this encounter: CMP on 10/6/2023 shows normal renal function, normal electrolytes.  CBC was also unremarkable.      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/17/2018    H/O myomectomy 11/22/2017    Heterozygous factor V Leiden mutation 06/06/2018    Factor 5 Leiden mutation, heterozygous 07/01/2019    Pneumonia due to COVID-19 virus 10/25/2021    Acute respiratory failure with hypoxia 10/30/2021    Steroid-induced hyperglycemia 10/30/2021    Elevated LFTs 11/01/2021    Mucositis oral 11/02/2021    Cytokine release syndrome,  grade 2 11/03/2021    Superficial thrombophlebitis 11/08/2021    Screening for colon cancer 02/07/2024     Resolved Ambulatory Problems     Diagnosis Date Noted    Degenerative disc disease at L5-S1 level 02/02/2017    New daily persistent headache 07/27/2017     Past Medical History:   Diagnosis Date    Allergic     Asthma Early childhood    Clotting disorder     GERD (gastroesophageal reflux disease)     Headache     Low back pain     Lumbar herniated disc 04/2014    Microscopic hematuria     Pneumonia     Pregnancy     Raynaud's disease        The patient qualifies to receive the vaccine, but they have not yet received it.    PAST SURGICAL HISTORY  Past Surgical History:   Procedure Laterality Date    HYSTEROSCOPY      04/2016    MYOMECTOMY ABDOMINAL APPROACH  05/2017    Laparoscopic         FAMILY HISTORY  Family History   Problem Relation Age of Onset    Kidney cancer Father 55    Thrombophilia Father         Fastor V Leiden    Deep vein thrombosis Father     Asthma Father     Cancer Father         Renal Cell Carcinoma- also present in one of his siblings    Stroke Maternal Grandmother     Heart attack Paternal Grandfather     Kidney cancer Paternal Aunt 55    Breast cancer Maternal Aunt     Cancer Maternal Aunt         Metastatic breast cancer         SOCIAL HISTORY  Social History     Socioeconomic History    Marital status:    Tobacco Use    Smoking status: Never    Smokeless tobacco: Never   Vaping Use    Vaping Use: Never used   Substance and Sexual Activity    Alcohol use: Yes     Alcohol/week: 2.0 standard drinks of alcohol     Types: 2 Glasses of wine per week     Comment: social    Drug use: No    Sexual activity: Yes     Partners: Male     Birth control/protection: None         ALLERGIES  Pseudoephedrine and Nedocromil        REVIEW OF SYSTEMS  Review of Systems   Constitutional:  Negative for chills and fever.   Respiratory:  Negative for cough and shortness of breath.    Allergic/Immunologic:  Negative for immunocompromised state.        All systems reviewed and negative except for those discussed in HPI.       PHYSICAL EXAM    I have reviewed the triage vital signs and nursing notes.    ED Triage Vitals [02/14/24 1048]   Temp Heart Rate Resp BP SpO2   97.5 °F (36.4 °C) 75 16 -- 100 %      Temp src Heart Rate Source Patient Position BP Location FiO2 (%)   -- -- -- -- --       Physical Exam  GENERAL: alert, no acute distress  SKIN: Warm, dry  HENT: Normocephalic, atraumatic  EYES: no scleral icterus  CV: regular rhythm, regular rate  RESPIRATORY: normal effort, lungs clear  ABDOMEN: soft, nontender, nondistended  MUSCULOSKELETAL: no deformity  NEURO: alert, moves all extremities, follows commands          LAB RESULTS  No results found for this or any previous visit (from the past 24 hour(s)).    Ordered the above labs and independently reviewed and interpreted the results.        RADIOLOGY  No Radiology Exams Resulted Within Past 24 Hours    I ordered the above noted radiological studies. Independently reviewed and interpreted by me.  See dictation for official radiology interpretation.      PROCEDURES    Procedures      MEDICATIONS GIVEN IN ER    Medications - No data to display      PROGRESS, DATA ANALYSIS, CONSULTS, AND MEDICAL DECISION MAKING    All labs have been independently reviewed and interpreted by me.  All radiology studies have been independently reviewed and interpreted by me and discussed with radiologist dictating the report.   EKG's independently reviewed and interpreted by me.  Discussion below represents my analysis of pertinent findings related to patient's condition, differential diagnosis, treatment plan and final disposition.        ED Course as of 02/14/24 1156   Wed Feb 14, 2024   1138 Discussed case with [facility] who states patient does not need any additional labs in ER. They recommend following up with [facility] for concern regarding MRSA exposure. Patient in question had  negative testing which they have discussed with patient. [DC]      ED Course User Index  [DC] Thea Garcia PA             AS OF 11:56 EST VITALS:    BP - 128/83  HR - 63  TEMP - 97.5 °F (36.4 °C)  O2 SATS - 100%        DIAGNOSIS  Final diagnoses:   Employee exposure to body fluids         DISPOSITION  ED Disposition       ED Disposition   Discharge    Condition   Stable    Comment   --                  Note Disclaimer: At [facility], we believe that sharing information builds trust and better relationships. You are receiving this note because you recently visited [facility]. It is possible you will see health information before a provider has talked with you about it. This kind of information can be easy to misunderstand. To help you fully understand what it means for your health, we urge you to discuss this note with your provider.         Thea Garcia PA  02/14/24 7807

## 2024-05-21 RX ORDER — ACETAMINOPHEN 160 MG
2000 TABLET,DISINTEGRATING ORAL DAILY
COMMUNITY

## 2024-05-21 RX ORDER — IBUPROFEN 800 MG/1
TABLET ORAL EVERY 8 HOURS SCHEDULED
COMMUNITY
End: 2024-05-21

## 2024-05-22 ENCOUNTER — HOSPITAL ENCOUNTER (OUTPATIENT)
Facility: HOSPITAL | Age: 49
Setting detail: HOSPITAL OUTPATIENT SURGERY
Discharge: HOME | End: 2024-05-22
Attending: COLON & RECTAL SURGERY | Admitting: COLON & RECTAL SURGERY
Payer: COMMERCIAL

## 2024-05-22 ENCOUNTER — ANESTHESIA EVENT (OUTPATIENT)
Dept: GASTROENTEROLOGY | Facility: HOSPITAL | Age: 49
End: 2024-05-22
Payer: COMMERCIAL

## 2024-05-22 ENCOUNTER — ANESTHESIA (OUTPATIENT)
Dept: GASTROENTEROLOGY | Facility: HOSPITAL | Age: 49
End: 2024-05-22
Payer: COMMERCIAL

## 2024-05-22 VITALS
HEART RATE: 57 BPM | OXYGEN SATURATION: 99 % | DIASTOLIC BLOOD PRESSURE: 59 MMHG | BODY MASS INDEX: 25.92 KG/M2 | WEIGHT: 155.6 LBS | SYSTOLIC BLOOD PRESSURE: 104 MMHG | HEIGHT: 65 IN | RESPIRATION RATE: 16 BRPM

## 2024-05-22 LAB
B-HCG UR QL: NEGATIVE
EXPIRATION DATE: NORMAL
INTERNAL NEGATIVE CONTROL: NEGATIVE
INTERNAL POSITIVE CONTROL: POSITIVE
Lab: NORMAL

## 2024-05-22 PROCEDURE — 25010000002 PROPOFOL 200 MG/20ML EMULSION: Performed by: STUDENT IN AN ORGANIZED HEALTH CARE EDUCATION/TRAINING PROGRAM

## 2024-05-22 PROCEDURE — 81025 URINE PREGNANCY TEST: CPT | Performed by: COLON & RECTAL SURGERY

## 2024-05-22 PROCEDURE — 25810000003 LACTATED RINGERS PER 1000 ML: Performed by: STUDENT IN AN ORGANIZED HEALTH CARE EDUCATION/TRAINING PROGRAM

## 2024-05-22 PROCEDURE — 25810000003 LACTATED RINGERS PER 1000 ML: Performed by: COLON & RECTAL SURGERY

## 2024-05-22 PROCEDURE — 25010000002 PROPOFOL 10 MG/ML EMULSION: Performed by: STUDENT IN AN ORGANIZED HEALTH CARE EDUCATION/TRAINING PROGRAM

## 2024-05-22 RX ORDER — SODIUM CHLORIDE, SODIUM LACTATE, POTASSIUM CHLORIDE, CALCIUM CHLORIDE 600; 310; 30; 20 MG/100ML; MG/100ML; MG/100ML; MG/100ML
30 INJECTION, SOLUTION INTRAVENOUS CONTINUOUS PRN
Status: DISCONTINUED | OUTPATIENT
Start: 2024-05-22 | End: 2024-05-22 | Stop reason: HOSPADM

## 2024-05-22 RX ORDER — SODIUM CHLORIDE 0.9 % (FLUSH) 0.9 %
10 SYRINGE (ML) INJECTION EVERY 12 HOURS SCHEDULED
Status: DISCONTINUED | OUTPATIENT
Start: 2024-05-22 | End: 2024-05-22 | Stop reason: HOSPADM

## 2024-05-22 RX ORDER — PROPOFOL 10 MG/ML
INJECTION, EMULSION INTRAVENOUS AS NEEDED
Status: DISCONTINUED | OUTPATIENT
Start: 2024-05-22 | End: 2024-05-22 | Stop reason: SURG

## 2024-05-22 RX ORDER — LIDOCAINE HYDROCHLORIDE 20 MG/ML
INJECTION, SOLUTION EPIDURAL; INFILTRATION; INTRACAUDAL; PERINEURAL AS NEEDED
Status: DISCONTINUED | OUTPATIENT
Start: 2024-05-22 | End: 2024-05-22 | Stop reason: SURG

## 2024-05-22 RX ORDER — SODIUM CHLORIDE, SODIUM LACTATE, POTASSIUM CHLORIDE, CALCIUM CHLORIDE 600; 310; 30; 20 MG/100ML; MG/100ML; MG/100ML; MG/100ML
INJECTION, SOLUTION INTRAVENOUS CONTINUOUS PRN
Status: DISCONTINUED | OUTPATIENT
Start: 2024-05-22 | End: 2024-05-22 | Stop reason: SURG

## 2024-05-22 RX ORDER — SODIUM CHLORIDE 9 MG/ML
40 INJECTION, SOLUTION INTRAVENOUS AS NEEDED
Status: DISCONTINUED | OUTPATIENT
Start: 2024-05-22 | End: 2024-05-22 | Stop reason: HOSPADM

## 2024-05-22 RX ORDER — SODIUM CHLORIDE 0.9 % (FLUSH) 0.9 %
10 SYRINGE (ML) INJECTION AS NEEDED
Status: DISCONTINUED | OUTPATIENT
Start: 2024-05-22 | End: 2024-05-22 | Stop reason: HOSPADM

## 2024-05-22 RX ADMIN — PROPOFOL 140 MCG/KG/MIN: 10 INJECTION, EMULSION INTRAVENOUS at 11:17

## 2024-05-22 RX ADMIN — SODIUM CHLORIDE, POTASSIUM CHLORIDE, SODIUM LACTATE AND CALCIUM CHLORIDE 30 ML/HR: 600; 310; 30; 20 INJECTION, SOLUTION INTRAVENOUS at 10:44

## 2024-05-22 RX ADMIN — SODIUM CHLORIDE, SODIUM LACTATE, POTASSIUM CHLORIDE, AND CALCIUM CHLORIDE: 600; 310; 30; 20 INJECTION, SOLUTION INTRAVENOUS at 11:15

## 2024-05-22 RX ADMIN — LIDOCAINE HYDROCHLORIDE 60 MG: 20 INJECTION, SOLUTION EPIDURAL; INFILTRATION; INTRACAUDAL; PERINEURAL at 11:17

## 2024-05-22 RX ADMIN — PROPOFOL 50 MG: 10 INJECTION, EMULSION INTRAVENOUS at 11:17

## 2024-05-22 NOTE — ANESTHESIA PREPROCEDURE EVALUATION
Anesthesia Evaluation     Patient summary reviewed and Nursing notes reviewed   history of anesthetic complications:  PONV  NPO Solid Status: > 8 hours  NPO Liquid Status: > 2 hours           Airway   Dental      Pulmonary    (+) asthma,  Cardiovascular     ECG reviewed      ROS comment: EKG SR    Neuro/Psych  GI/Hepatic/Renal/Endo    (+) GERD    Musculoskeletal     Abdominal    Substance History      OB/GYN          Other                          Anesthesia Plan    ASA 2     MAC     intravenous induction     Anesthetic plan, risks, benefits, and alternatives have been provided, discussed and informed consent has been obtained with: patient.        CODE STATUS:          Unknown

## 2024-05-22 NOTE — ANESTHESIA POSTPROCEDURE EVALUATION
Patient: Claudia Price    Procedure Summary       Date: 05/22/24 Room / Location:  [facility]    Anesthesia Start: 1115 Anesthesia Stop: 1134    Procedure: COLONOSCOPY TO CECUM Diagnosis:       Screening for colon cancer      (Screening for colon cancer [Z12.11])    Surgeons: Andres Salinas MD Provider: Sixto Giang MD    Anesthesia Type: MAC ASA Status: 2            Anesthesia Type: MAC    Vitals  Vitals Value Taken Time   /66 05/22/24 1135   Temp     Pulse 74 05/22/24 1144   Resp 13 05/22/24 1134   SpO2 95 % 05/22/24 1144   Vitals shown include unfiled device data.        Post Anesthesia Care and Evaluation    Patient location during evaluation: bedside  Patient participation: complete - patient participated  Level of consciousness: awake and alert  Pain management: adequate    Airway patency: patent  Anesthetic complications: No anesthetic complications  PONV Status: controlled  Cardiovascular status: blood pressure returned to baseline and acceptable  Respiratory status: acceptable  Hydration status: acceptable

## 2024-05-22 NOTE — DISCHARGE INSTRUCTIONS
For the next 24 hours patient needs to be with a responsible adult.    For 24 hours DO NOT drive, operate machinery, appliances, drink alcohol, make important decisions or sign legal documents.    Start with a light or bland diet if you are feeling sick to your stomach otherwise advance to regular diet as tolerated.    Follow recommendations on procedure report if provided by your doctor.    Call Dr Salinas for problems [phone]    Problems may include but not limited to: large amounts of bleeding, trouble breathing, repeated vomiting, severe unrelieved pain, fever or chills.

## 2024-05-22 NOTE — H&P
Claudia Price is a 48 y.o. female  who is referred by Andres Salinas MD for a colonoscopy. She   has an indications: screening for colon cancer.     She denies any change in bowel function, melena, or hematochezia.    Past Medical History:   Diagnosis Date    Allergic     Seasonal, Sinus issues    Asthma Early childhood    Borderline Asthma    Clotting disorder     Factor V leiden    Degenerative disc disease at L5-S1 level 2017    MRI 2017 2 mm disk bulge    GERD (gastroesophageal reflux disease)     Reflux occasionally    Headache     Triggers avoided, take Magnesium nightly, prn Ubrelvy    Health care maintenance 2017    Low back pain     Lumbar disc herniation,  sciatica    Lumbar herniated disc 2014    MRI L4-5 mild also mild retrolisthesis    Microscopic hematuria     cystoscopy and MRI by Dr Rees 2015    Pneumonia     Covid Pneumonia     PONV (postoperative nausea and vomiting)     Pregnancy         Raynaud's disease        Past Surgical History:   Procedure Laterality Date    HYSTEROSCOPY      2016    MYOMECTOMY ABDOMINAL APPROACH  2017    Laparoscopic       Medications Prior to Admission   Medication Sig Dispense Refill Last Dose    cetirizine (zyrTEC) 10 MG tablet Take 1 tablet by mouth.   2024    Cholecalciferol (Vitamin D3) 50 MCG (2000 UT) capsule Take 1 capsule by mouth Daily.   2024    EVENING PRIMROSE OIL PO Take  by mouth.   2024    Magnesium 500 MG capsule Take 1 capsule by mouth Every Night.   2024    Probiotic Product (PROBIOTIC PO) Take 1 tablet by mouth Daily.   2024    Triamcinolone Acetonide (NASACORT) 55 MCG/ACT nasal inhaler 1 spray Daily.   2024    TURMERIC PO Take 600 mg by mouth Daily.   2024    albuterol sulfate  (90 Base) MCG/ACT inhaler Every 4 (Four) Hours.       ubrogepant 100 MG tablet 1 tablet by Nasogastric route As Needed.          Allergies   Allergen Reactions    Nedocromil Shortness Of Breath     "Pseudoephedrine Anxiety and Other (See Comments)     Tachycardia 160's, \"felt like a heart attack\"       Family History   Problem Relation Age of Onset    Kidney cancer Father 55    Thrombophilia Father         Dixon BANEGAS Leiisis    Deep vein thrombosis Father     Asthma Father     Cancer Father         Renal Cell Carcinoma- also present in one of his siblings    Breast cancer Maternal Aunt     Cancer Maternal Aunt         Metastatic breast cancer    Kidney cancer Paternal Aunt 55    Stroke Maternal Grandmother     Heart attack Paternal Grandfather     Malig Hyperthermia Neg Hx        Social History     Socioeconomic History    Marital status:    Tobacco Use    Smoking status: Never    Smokeless tobacco: Never   Vaping Use    Vaping status: Never Used   Substance and Sexual Activity    Alcohol use: Yes     Alcohol/week: 2.0 standard drinks of alcohol     Types: 2 Glasses of wine per week     Comment: social    Drug use: No    Sexual activity: Yes     Partners: Male     Birth control/protection: None       Review of Systems   Gastrointestinal:  Negative for abdominal pain, nausea and vomiting.   All other systems reviewed and are negative.      Vitals:    05/22/24 1041   BP: 131/88   Pulse: 70   Resp: 16   SpO2: 99%         Physical Exam  Constitutional:       Appearance: She is well-developed.   HENT:      Head: Normocephalic and atraumatic.   Eyes:      Pupils: Pupils are equal, round, and reactive to light.   Cardiovascular:      Rate and Rhythm: Regular rhythm.   Pulmonary:      Effort: Pulmonary effort is normal.   Abdominal:      General: There is no distension.      Palpations: Abdomen is soft.   Musculoskeletal:         General: Normal range of motion.   Skin:     General: Skin is warm and dry.   Neurological:      Mental Status: She is alert and oriented to person, place, and time.   Psychiatric:         Thought Content: Thought content normal.         Judgment: Judgment normal.           Assessment & " Plan      indications: screening for colon cancer         I recommend colonoscopy.  I described risks, benefits of the procedure with the patient including but not limited to bleeding, infection, possibility of perforation and possible polypectomy. All of the patient's questions were answered and they would like to proceed with the above recommendations.

## 2024-10-07 ENCOUNTER — OFFICE VISIT (OUTPATIENT)
Dept: FAMILY MEDICINE CLINIC | Facility: CLINIC | Age: 49
End: 2024-10-07
Payer: COMMERCIAL

## 2024-10-07 VITALS
BODY MASS INDEX: 27.36 KG/M2 | OXYGEN SATURATION: 100 % | SYSTOLIC BLOOD PRESSURE: 128 MMHG | HEIGHT: 65 IN | WEIGHT: 164.2 LBS | DIASTOLIC BLOOD PRESSURE: 85 MMHG | TEMPERATURE: 98.3 F | HEART RATE: 49 BPM | RESPIRATION RATE: 16 BRPM

## 2024-10-07 DIAGNOSIS — R53.83 FATIGUE, UNSPECIFIED TYPE: ICD-10-CM

## 2024-10-07 DIAGNOSIS — E78.00 HYPERCHOLESTEROLEMIA: ICD-10-CM

## 2024-10-07 DIAGNOSIS — Z00.00 WELL ADULT EXAM: Primary | ICD-10-CM

## 2024-10-07 PROCEDURE — 99396 PREV VISIT EST AGE 40-64: CPT | Performed by: FAMILY MEDICINE

## 2024-10-07 NOTE — PROGRESS NOTES
Chief Complaint  Annual Exam    Subjective    History of Present Illness {CC  Problem List  Visit  Diagnosis   Encounters  Notes  Medications  Labs  Result Review Imaging  Media :23}     Claudia Price presents to [facility] for Annual Exam.  She is  and has 1 child age 13. 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 She sees the gynecologist for her pap smear and mammogram and is up to date on both. Her last colonoscopy was 5/2024 and was normal. She has no family history of colon cancer and was told to recheck in 10 years. She exercises daily a mix of aerobic and strength. She has never been a smoker. She is up to date on routine dental and eye exams.     Their chronic medical conditions were reviewed and are stable unless noted otherwise below.    She has a history of hypercholesterolemia despite regular exercise and vegetarian diet.   The 10-year ASCVD risk score (Sagrario WALTER, et al., 2019) is: 1.3%    Values used to calculate the score:      Age: 49 years      Sex: Female      Is Non- : No      Diabetic: No      Tobacco smoker: No      Systolic Blood Pressure: 128 mmHg      Is BP treated: No      HDL Cholesterol: 65 mg/dL      Total Cholesterol: 237 mg/dL      History of Present Illness     Social History     Socioeconomic History    Marital status:    Tobacco Use    Smoking status: Never    Smokeless tobacco: Never   Vaping Use    Vaping status: Never Used   Substance and Sexual Activity    Alcohol use: Yes     Alcohol/week: 2.0 standard drinks of alcohol     Types: 2 Glasses of wine per week     Comment: social    Drug use: Never    Sexual activity: Yes     Partners: Male     Birth control/protection: None        Review of Systems   Constitutional:  Positive for fatigue (intermittent).   HENT:  Negative for ear pain and sinus pain.    Eyes:  Negative for pain and visual disturbance.   Respiratory:  Negative for cough and shortness of breath.   "  Cardiovascular:  Negative for chest pain and palpitations.   Gastrointestinal:  Negative for abdominal pain, constipation and diarrhea.   Genitourinary:  Positive for menstrual problem (irregular, suspects perimenopause). Negative for dysuria.   Musculoskeletal:  Positive for arthralgias (knee pain, ? related to hormones).   Skin:  Negative for color change.   Allergic/Immunologic: Positive for environmental allergies (takes generic zyrtec and nasacort as needed).   Neurological:  Negative for dizziness and headaches.   Psychiatric/Behavioral:  Negative for dysphoric mood. The patient is nervous/anxious (mild, improved with evening primrose).         Objective       Vital Signs:   /85 (BP Location: Left arm, Patient Position: Sitting, Cuff Size: Adult)   Pulse (!) 49   Temp 98.3 °F (36.8 °C) (Oral)   Resp 16   Ht 165.1 cm (65\")   Wt 74.5 kg (164 lb 3.2 oz)   SpO2 100%   BMI 27.32 kg/m²     Body mass index is 27.32 kg/m².     PHQ-9 Depression Screening  Little interest or pleasure in doing things? 0-->not at all   Feeling down, depressed, or hopeless? 0-->not at all   Trouble falling or staying asleep, or sleeping too much?     Feeling tired or having little energy?     Poor appetite or overeating?     Feeling bad about yourself - or that you are a failure or have let yourself or your family down?     Trouble concentrating on things, such as reading the newspaper or watching television?     Moving or speaking so slowly that other people could have noticed? Or the opposite - being so fidgety or restless that you have been moving around a lot more than usual?     Thoughts that you would be better off dead, or of hurting yourself in some way?     PHQ-9 Total Score 0   If you checked off any problems, how difficult have these problems made it for you to do your work, take care of things at home, or get along with other people?           Physical Exam  Constitutional:       General: She is not in acute " distress.  HENT:      Head: Normocephalic and atraumatic.      Nose: No rhinorrhea.      Mouth/Throat:      Mouth: Mucous membranes are moist.   Eyes:      Conjunctiva/sclera: Conjunctivae normal.   Cardiovascular:      Rate and Rhythm: Normal rate and regular rhythm.      Pulses: Normal pulses.      Heart sounds: No murmur heard.  Pulmonary:      Effort: No respiratory distress.      Breath sounds: Normal breath sounds.   Abdominal:      General: There is no distension.      Palpations: Abdomen is soft.      Tenderness: There is no abdominal tenderness.   Musculoskeletal:         General: Normal range of motion.      Right lower leg: No edema.      Left lower leg: No edema.   Lymphadenopathy:      Cervical: No cervical adenopathy.   Skin:     Findings: No lesion.   Neurological:      General: No focal deficit present.      Mental Status: She is alert.   Psychiatric:         Behavior: Behavior normal.          Result Review  Data Reviewed:{ Labs  Result Review  Imaging  Med Tab  Media :23}                Assessment and Plan {CC Problem List  Visit Diagnosis  ROS  Review (Popup)  Health Maintenance  Quality  BestPractice  Medications  SmartSets  SnapShot Encounters  Media :23}   Diagnoses and all orders for this visit:    1. Well adult exam (Primary)  -     CBC & Differential; Future  -     Comprehensive Metabolic Panel; Future  -     Lipid Panel; Future  -     TSH Rfx On Abnormal To Free T4; Future    2. Fatigue, unspecified type  -     CBC & Differential; Future  -     Comprehensive Metabolic Panel; Future  -     TSH Rfx On Abnormal To Free T4; Future  -     Vitamin B12; Future    3. Hypercholesterolemia  -     Lipid Panel; Future        Patient Instructions   Continue your current medications.   Aim for at least 150 minutes of aerobic exercise weekly.  Return for fasting labs. You should receive a call or my chart message with your test results. If you have not received your results in 7-10 days  after having labs, please contact the office.    Continue yearly mammogram and routine pap smear with gynecologist.   Colonoscopy is up to date and next due 2034.      Routine health maintenance, immunizations, and cancer screenings were discussed and encouraged.  She plans to get your flu shot through work.    Patient was given instructions and counseling regarding her condition or for health maintenance advice on the AVS.       No follow-ups on file.    Galina Blankenship MD

## 2024-10-07 NOTE — PATIENT INSTRUCTIONS
Continue your current medications.   Aim for at least 150 minutes of aerobic exercise weekly.  Return for fasting labs. You should receive a call or my chart message with your test results. If you have not received your results in 7-10 days after having labs, please contact the office.    Continue yearly mammogram and routine pap smear with gynecologist.   Colonoscopy is up to date and next due 2034.

## 2024-10-28 ENCOUNTER — READMISSION MANAGEMENT (OUTPATIENT)
Dept: CALL CENTER | Facility: HOSPITAL | Age: 49
End: 2024-10-28
Payer: COMMERCIAL

## 2024-10-28 ENCOUNTER — APPOINTMENT (OUTPATIENT)
Dept: GENERAL RADIOLOGY | Facility: HOSPITAL | Age: 49
End: 2024-10-28
Payer: COMMERCIAL

## 2024-10-28 ENCOUNTER — HOSPITAL ENCOUNTER (OUTPATIENT)
Facility: HOSPITAL | Age: 49
Discharge: HOME | End: 2024-10-28
Attending: EMERGENCY MEDICINE | Admitting: STUDENT IN AN ORGANIZED HEALTH CARE EDUCATION/TRAINING PROGRAM
Payer: COMMERCIAL

## 2024-10-28 VITALS
DIASTOLIC BLOOD PRESSURE: 61 MMHG | SYSTOLIC BLOOD PRESSURE: 102 MMHG | BODY MASS INDEX: 26.74 KG/M2 | WEIGHT: 160.5 LBS | HEIGHT: 65 IN | HEART RATE: 47 BPM | RESPIRATION RATE: 16 BRPM | OXYGEN SATURATION: 97 % | TEMPERATURE: 97.9 F

## 2024-10-28 DIAGNOSIS — R07.9 CHEST PAIN, UNSPECIFIED TYPE: Primary | ICD-10-CM

## 2024-10-28 LAB
ALBUMIN SERPL-MCNC: 4.3 G/DL (ref 3.5–5.2)
ALBUMIN/GLOB SERPL: 1.4 G/DL
ALP SERPL-CCNC: 82 U/L (ref 39–117)
ALT SERPL W P-5'-P-CCNC: 15 U/L (ref 1–33)
ANION GAP SERPL CALCULATED.3IONS-SCNC: 10 MMOL/L (ref 5–15)
AST SERPL-CCNC: 18 U/L (ref 1–32)
BASOPHILS # BLD AUTO: 0.07 10*3/MM3 (ref 0–0.2)
BASOPHILS NFR BLD AUTO: 1 % (ref 0–1.5)
BILIRUB SERPL-MCNC: <0.2 MG/DL (ref 0–1.2)
BUN SERPL-MCNC: 15 MG/DL (ref 6–20)
BUN/CREAT SERPL: 18.5 (ref 7–25)
CALCIUM SPEC-SCNC: 9.9 MG/DL (ref 8.6–10.5)
CHLORIDE SERPL-SCNC: 105 MMOL/L (ref 98–107)
CHOLEST SERPL-MCNC: 192 MG/DL (ref 0–200)
CO2 SERPL-SCNC: 25 MMOL/L (ref 22–29)
CREAT SERPL-MCNC: 0.81 MG/DL (ref 0.57–1)
DEPRECATED RDW RBC AUTO: 39.6 FL (ref 37–54)
EGFRCR SERPLBLD CKD-EPI 2021: 88.6 ML/MIN/1.73
EOSINOPHIL # BLD AUTO: 0.39 10*3/MM3 (ref 0–0.4)
EOSINOPHIL NFR BLD AUTO: 5.7 % (ref 0.3–6.2)
ERYTHROCYTE [DISTWIDTH] IN BLOOD BY AUTOMATED COUNT: 12.9 % (ref 12.3–15.4)
GEN 5 2HR TROPONIN T REFLEX: <6 NG/L
GLOBULIN UR ELPH-MCNC: 3.1 GM/DL
GLUCOSE SERPL-MCNC: 89 MG/DL (ref 65–99)
HCT VFR BLD AUTO: 42.9 % (ref 34–46.6)
HDLC SERPL-MCNC: 45 MG/DL (ref 40–60)
HGB BLD-MCNC: 14.1 G/DL (ref 12–15.9)
HOLD SPECIMEN: NORMAL
HOLD SPECIMEN: NORMAL
IMM GRANULOCYTES # BLD AUTO: 0.01 10*3/MM3 (ref 0–0.05)
IMM GRANULOCYTES NFR BLD AUTO: 0.1 % (ref 0–0.5)
LDLC SERPL CALC-MCNC: 125 MG/DL (ref 0–100)
LDLC/HDLC SERPL: 2.72 {RATIO}
LYMPHOCYTES # BLD AUTO: 2.8 10*3/MM3 (ref 0.7–3.1)
LYMPHOCYTES NFR BLD AUTO: 40.8 % (ref 19.6–45.3)
MAGNESIUM SERPL-MCNC: 2.2 MG/DL (ref 1.6–2.6)
MCH RBC QN AUTO: 27.4 PG (ref 26.6–33)
MCHC RBC AUTO-ENTMCNC: 32.9 G/DL (ref 31.5–35.7)
MCV RBC AUTO: 83.5 FL (ref 79–97)
MONOCYTES # BLD AUTO: 0.56 10*3/MM3 (ref 0.1–0.9)
MONOCYTES NFR BLD AUTO: 8.2 % (ref 5–12)
NEUTROPHILS NFR BLD AUTO: 3.03 10*3/MM3 (ref 1.7–7)
NEUTROPHILS NFR BLD AUTO: 44.2 % (ref 42.7–76)
NRBC BLD AUTO-RTO: 0 /100 WBC (ref 0–0.2)
NT-PROBNP SERPL-MCNC: <36 PG/ML (ref 0–900)
PLATELET # BLD AUTO: 277 10*3/MM3 (ref 140–450)
PMV BLD AUTO: 9.9 FL (ref 6–12)
POTASSIUM SERPL-SCNC: 3.5 MMOL/L (ref 3.5–5.2)
POTASSIUM SERPL-SCNC: 4.2 MMOL/L (ref 3.5–5.2)
PROCALCITONIN SERPL-MCNC: 0.03 NG/ML (ref 0–0.25)
PROT SERPL-MCNC: 7.4 G/DL (ref 6–8.5)
QT INTERVAL: 435 MS
QTC INTERVAL: 435 MS
RBC # BLD AUTO: 5.14 10*6/MM3 (ref 3.77–5.28)
SODIUM SERPL-SCNC: 140 MMOL/L (ref 136–145)
TRIGL SERPL-MCNC: 123 MG/DL (ref 0–150)
TROPONIN T DELTA: NORMAL
TROPONIN T SERPL HS-MCNC: <6 NG/L
TROPONIN T SERPL HS-MCNC: <6 NG/L
VLDLC SERPL-MCNC: 22 MG/DL (ref 5–40)
WBC NRBC COR # BLD AUTO: 6.86 10*3/MM3 (ref 3.4–10.8)
WHOLE BLOOD HOLD COAG: NORMAL
WHOLE BLOOD HOLD SPECIMEN: NORMAL

## 2024-10-28 PROCEDURE — 71045 X-RAY EXAM CHEST 1 VIEW: CPT

## 2024-10-28 PROCEDURE — 84484 ASSAY OF TROPONIN QUANT: CPT | Performed by: EMERGENCY MEDICINE

## 2024-10-28 PROCEDURE — 93010 ELECTROCARDIOGRAM REPORT: CPT | Performed by: INTERNAL MEDICINE

## 2024-10-28 PROCEDURE — 99204 OFFICE O/P NEW MOD 45 MIN: CPT | Performed by: INTERNAL MEDICINE

## 2024-10-28 PROCEDURE — 83880 ASSAY OF NATRIURETIC PEPTIDE: CPT | Performed by: EMERGENCY MEDICINE

## 2024-10-28 PROCEDURE — 96375 TX/PRO/DX INJ NEW DRUG ADDON: CPT

## 2024-10-28 PROCEDURE — 25010000002 ONDANSETRON PER 1 MG: Performed by: EMERGENCY MEDICINE

## 2024-10-28 PROCEDURE — 85025 COMPLETE CBC W/AUTO DIFF WBC: CPT | Performed by: EMERGENCY MEDICINE

## 2024-10-28 PROCEDURE — 84132 ASSAY OF SERUM POTASSIUM: CPT | Performed by: NURSE PRACTITIONER

## 2024-10-28 PROCEDURE — G0378 HOSPITAL OBSERVATION PER HR: HCPCS

## 2024-10-28 PROCEDURE — 99285 EMERGENCY DEPT VISIT HI MDM: CPT

## 2024-10-28 PROCEDURE — 96374 THER/PROPH/DIAG INJ IV PUSH: CPT

## 2024-10-28 PROCEDURE — 25010000002 MORPHINE PER 10 MG: Performed by: EMERGENCY MEDICINE

## 2024-10-28 PROCEDURE — 80061 LIPID PANEL: CPT | Performed by: NURSE PRACTITIONER

## 2024-10-28 PROCEDURE — 80053 COMPREHEN METABOLIC PANEL: CPT | Performed by: EMERGENCY MEDICINE

## 2024-10-28 PROCEDURE — 93005 ELECTROCARDIOGRAM TRACING: CPT | Performed by: EMERGENCY MEDICINE

## 2024-10-28 PROCEDURE — 83735 ASSAY OF MAGNESIUM: CPT | Performed by: EMERGENCY MEDICINE

## 2024-10-28 PROCEDURE — 84484 ASSAY OF TROPONIN QUANT: CPT | Performed by: PHYSICIAN ASSISTANT

## 2024-10-28 PROCEDURE — 84145 PROCALCITONIN (PCT): CPT | Performed by: EMERGENCY MEDICINE

## 2024-10-28 PROCEDURE — 36415 COLL VENOUS BLD VENIPUNCTURE: CPT

## 2024-10-28 RX ORDER — ALUMINA, MAGNESIA, AND SIMETHICONE 2400; 2400; 240 MG/30ML; MG/30ML; MG/30ML
15 SUSPENSION ORAL ONCE
Status: COMPLETED | OUTPATIENT
Start: 2024-10-28 | End: 2024-10-28

## 2024-10-28 RX ORDER — CETIRIZINE HYDROCHLORIDE 10 MG/1
10 TABLET ORAL DAILY
Status: DISCONTINUED | OUTPATIENT
Start: 2024-10-28 | End: 2024-10-28 | Stop reason: HOSPADM

## 2024-10-28 RX ORDER — BISACODYL 5 MG/1
5 TABLET, DELAYED RELEASE ORAL DAILY PRN
Status: DISCONTINUED | OUTPATIENT
Start: 2024-10-28 | End: 2024-10-28 | Stop reason: HOSPADM

## 2024-10-28 RX ORDER — ALBUTEROL SULFATE 0.83 MG/ML
2.5 SOLUTION RESPIRATORY (INHALATION) EVERY 6 HOURS PRN
Status: DISCONTINUED | OUTPATIENT
Start: 2024-10-28 | End: 2024-10-28 | Stop reason: HOSPADM

## 2024-10-28 RX ORDER — SODIUM CHLORIDE 0.9 % (FLUSH) 0.9 %
10 SYRINGE (ML) INJECTION AS NEEDED
Status: DISCONTINUED | OUTPATIENT
Start: 2024-10-28 | End: 2024-10-28 | Stop reason: HOSPADM

## 2024-10-28 RX ORDER — ACETAMINOPHEN 325 MG/1
650 TABLET ORAL EVERY 4 HOURS PRN
Status: DISCONTINUED | OUTPATIENT
Start: 2024-10-28 | End: 2024-10-28 | Stop reason: HOSPADM

## 2024-10-28 RX ORDER — POTASSIUM CHLORIDE 750 MG/1
40 TABLET, FILM COATED, EXTENDED RELEASE ORAL EVERY 4 HOURS
Status: DISCONTINUED | OUTPATIENT
Start: 2024-10-28 | End: 2024-10-28 | Stop reason: HOSPADM

## 2024-10-28 RX ORDER — POTASSIUM CHLORIDE 750 MG/1
40 TABLET, FILM COATED, EXTENDED RELEASE ORAL EVERY 4 HOURS
Status: COMPLETED | OUTPATIENT
Start: 2024-10-28 | End: 2024-10-28

## 2024-10-28 RX ORDER — PANTOPRAZOLE SODIUM 40 MG/1
40 TABLET, DELAYED RELEASE ORAL DAILY
Qty: 30 TABLET | Refills: 0 | Status: SHIPPED | OUTPATIENT
Start: 2024-10-28

## 2024-10-28 RX ORDER — SUCRALFATE 1 G/1
1 TABLET ORAL
Status: DISCONTINUED | OUTPATIENT
Start: 2024-10-28 | End: 2024-10-28 | Stop reason: HOSPADM

## 2024-10-28 RX ORDER — L.ACID,PARA/B.BIFIDUM/S.THERM 8B CELL
1 CAPSULE ORAL DAILY
Status: DISCONTINUED | OUTPATIENT
Start: 2024-10-28 | End: 2024-10-28 | Stop reason: HOSPADM

## 2024-10-28 RX ORDER — ONDANSETRON 2 MG/ML
4 INJECTION INTRAMUSCULAR; INTRAVENOUS EVERY 6 HOURS PRN
Status: DISCONTINUED | OUTPATIENT
Start: 2024-10-28 | End: 2024-10-28 | Stop reason: HOSPADM

## 2024-10-28 RX ORDER — CHOLECALCIFEROL (VITAMIN D3) 25 MCG
2000 TABLET ORAL DAILY
Status: DISCONTINUED | OUTPATIENT
Start: 2024-10-28 | End: 2024-10-28 | Stop reason: HOSPADM

## 2024-10-28 RX ORDER — FAMOTIDINE 10 MG/ML
20 INJECTION, SOLUTION INTRAVENOUS ONCE
Status: COMPLETED | OUTPATIENT
Start: 2024-10-28 | End: 2024-10-28

## 2024-10-28 RX ORDER — MORPHINE SULFATE 2 MG/ML
2 INJECTION, SOLUTION INTRAMUSCULAR; INTRAVENOUS ONCE
Status: COMPLETED | OUTPATIENT
Start: 2024-10-28 | End: 2024-10-28

## 2024-10-28 RX ORDER — AMOXICILLIN 250 MG
2 CAPSULE ORAL 2 TIMES DAILY PRN
Status: DISCONTINUED | OUTPATIENT
Start: 2024-10-28 | End: 2024-10-28 | Stop reason: HOSPADM

## 2024-10-28 RX ORDER — ONDANSETRON 4 MG/1
4 TABLET, ORALLY DISINTEGRATING ORAL EVERY 6 HOURS PRN
Status: DISCONTINUED | OUTPATIENT
Start: 2024-10-28 | End: 2024-10-28 | Stop reason: HOSPADM

## 2024-10-28 RX ORDER — NITROGLYCERIN 0.4 MG/1
0.4 TABLET SUBLINGUAL
Status: DISCONTINUED | OUTPATIENT
Start: 2024-10-28 | End: 2024-10-28 | Stop reason: HOSPADM

## 2024-10-28 RX ORDER — ONDANSETRON 2 MG/ML
4 INJECTION INTRAMUSCULAR; INTRAVENOUS ONCE
Status: COMPLETED | OUTPATIENT
Start: 2024-10-28 | End: 2024-10-28

## 2024-10-28 RX ORDER — POLYETHYLENE GLYCOL 3350 17 G/17G
17 POWDER, FOR SOLUTION ORAL DAILY PRN
Status: DISCONTINUED | OUTPATIENT
Start: 2024-10-28 | End: 2024-10-28 | Stop reason: HOSPADM

## 2024-10-28 RX ORDER — SUCRALFATE 1 G/1
1 TABLET ORAL
Qty: 90 TABLET | Refills: 1 | Status: SHIPPED | OUTPATIENT
Start: 2024-10-28

## 2024-10-28 RX ORDER — BISACODYL 10 MG
10 SUPPOSITORY, RECTAL RECTAL DAILY PRN
Status: DISCONTINUED | OUTPATIENT
Start: 2024-10-28 | End: 2024-10-28 | Stop reason: HOSPADM

## 2024-10-28 RX ORDER — ASPIRIN 325 MG
325 TABLET ORAL ONCE
Status: COMPLETED | OUTPATIENT
Start: 2024-10-28 | End: 2024-10-28

## 2024-10-28 RX ORDER — SODIUM CHLORIDE 0.9 % (FLUSH) 0.9 %
10 SYRINGE (ML) INJECTION EVERY 12 HOURS SCHEDULED
Status: DISCONTINUED | OUTPATIENT
Start: 2024-10-28 | End: 2024-10-28 | Stop reason: HOSPADM

## 2024-10-28 RX ADMIN — ASPIRIN 325 MG: 325 TABLET ORAL at 02:46

## 2024-10-28 RX ADMIN — MORPHINE SULFATE 2 MG: 2 INJECTION, SOLUTION INTRAMUSCULAR; INTRAVENOUS at 03:35

## 2024-10-28 RX ADMIN — POTASSIUM CHLORIDE 40 MEQ: 750 TABLET, EXTENDED RELEASE ORAL at 12:52

## 2024-10-28 RX ADMIN — POTASSIUM CHLORIDE 40 MEQ: 750 TABLET, EXTENDED RELEASE ORAL at 07:51

## 2024-10-28 RX ADMIN — ALUMINUM HYDROXIDE, MAGNESIUM HYDROXIDE, AND DIMETHICONE 15 ML: 400; 400; 40 SUSPENSION ORAL at 10:34

## 2024-10-28 RX ADMIN — SUCRALFATE 1 G: 1 TABLET ORAL at 10:34

## 2024-10-28 RX ADMIN — NITROGLYCERIN 1 INCH: 20 OINTMENT TOPICAL at 02:48

## 2024-10-28 RX ADMIN — Medication 1 CAPSULE: at 12:52

## 2024-10-28 RX ADMIN — CETIRIZINE HYDROCHLORIDE 10 MG: 10 TABLET ORAL at 10:34

## 2024-10-28 RX ADMIN — POTASSIUM CHLORIDE 40 MEQ: 750 TABLET, EXTENDED RELEASE ORAL at 10:34

## 2024-10-28 RX ADMIN — Medication 2000 UNITS: at 10:34

## 2024-10-28 RX ADMIN — ONDANSETRON 4 MG: 2 INJECTION, SOLUTION INTRAMUSCULAR; INTRAVENOUS at 02:50

## 2024-10-28 RX ADMIN — FAMOTIDINE 20 MG: 10 INJECTION INTRAVENOUS at 14:01

## 2024-10-28 RX ADMIN — Medication 10 ML: at 10:34

## 2024-10-28 NOTE — CASE MANAGEMENT/SOCIAL WORK
Discharge Planning Assessment  [facility]     Patient Name: Claudia Price  MRN: [id number]  Today's Date: 10/28/2024    Admit Date: 10/28/2024    Plan: Home with family support   Discharge Needs Assessment       Row Name 10/28/24 1123       Living Environment    People in Home spouse    Name(s) of People in Home Jr Price spouse [phone]    Current Living Arrangements home    Potentially Unsafe Housing Conditions none    In the past 12 months has the electric, gas, oil, or water company threatened to shut off services in your home? No    Primary Care Provided by self    Provides Primary Care For no one    Family Caregiver if Needed spouse    Family Caregiver Names Jr Price spouse [phone]    Quality of Family Relationships unable to assess    Able to Return to Prior Arrangements yes       Resource/Environmental Concerns    Resource/Environmental Concerns none    Transportation Concerns none       Transportation Needs    In the past 12 months, has lack of transportation kept you from medical appointments or from getting medications? no    In the past 12 months, has lack of transportation kept you from meetings, work, or from getting things needed for daily living? No       Food Insecurity    Within the past 12 months, the food you bought just didn't last and you didn't have money to get more. Never true       Transition Planning    Patient/Family Anticipates Transition to home with family    Patient/Family Anticipated Services at Transition none    Transportation Anticipated family or friend will provide       Discharge Needs Assessment    Readmission Within the Last 30 Days no previous admission in last 30 days    Equipment Currently Used at Home none    Concerns to be Addressed no discharge needs identified;denies needs/concerns at this time    Do you want help finding or keeping work or a job? I do not need or want help    Do you want help with school or training? For example, starting or completing job  training or getting a high school diploma, GED or equivalent No    Anticipated Changes Related to Illness none    Equipment Needed After Discharge none    Provided Post Acute Provider List? N/A    N/A Provider List Comment No needs identified    Provided Post Acute Provider Quality & Resource List? N/A    Offered/Gave Vendor List no                   Discharge Plan       Row Name 10/28/24 1127       Plan    Plan Home with family support    Patient/Family in Agreement with Plan yes    Plan Comments Met with patient and  at bedside. Patient granted me permission to speak to her while  remained in the room. Face sheet verified. Prior to admission patient was independent with all aspects of her ADL’s. Denies using any special or adaptive equipment. Patient uses the [facility] on [address], denies any issues affording or remembering to take her medications. Patient will use [facility] at discharge. Patient does not have POA or living will on file, but states  knows her wishes. Discharge plans are to return home with family support.  Family to transport home. Will continue to monitor for new or changing discharge needs Inna BECKHAM RN CCP                  Continued Care and Services - Admitted Since 10/28/2024    No active coordination exists for this encounter.       Expected Discharge Date and Time       Expected Discharge Date Expected Discharge Time    Oct 29, 2024            Demographic Summary       Row Name 10/28/24 1122       General Information    Admission Type observation    Arrived From home    Referral Source admission list    Reason for Consult discharge planning    Preferred Language English       Contact Information    Permission Granted to Share Info With family/designee  Jr Price spouse [phone]                   Functional Status       Row Name 10/28/24 1122       Functional Status    Usual Activity Tolerance excellent    Current Activity Tolerance good       Functional  Status, IADL    Medications independent    Meal Preparation independent    Housekeeping independent    Laundry independent    Shopping independent    If for any reason you need help with day-to-day activities such as bathing, preparing meals, shopping, managing finances, etc., do you get the help you need? I don't need any help       Mental Status    General Appearance WDL WDL       Mental Status Summary    Recent Changes in Mental Status/Cognitive Functioning no changes       Employment/    Employment Status employed full-time                   Psychosocial    No documentation.                  Abuse/Neglect    No documentation.                  Legal    No documentation.                  Substance Abuse    No documentation.                  Patient Forms    No documentation.                     Inna Chavez RN

## 2024-10-28 NOTE — CONSULTS
Cardiology History & Physical / Consultation      Patient Name: Claudia Price  Age/Sex: 49 y.o. female  : 1975  MRN: [id number]    Date of Admission: 10/28/2024  Date of Encounter Visit: 10/28/24  Encounter Provider: Gabriel Taylor MD  Referring Provider: Satya MEDEROS MD  Place of Service: [facility] CARDIOLOGY  Patient Care Team:  Galina Blankenship MD as PCP - General (Family Medicine)          Subjective:     Chief Complaint: chest pain    Reason for consultation:chest pain     History of Present Illness:  Claudia Price is a 49 y.o. female pt with a history of asthma, HLD,Factor V Leiden, DDD, GERD, and Raynaud's disease.     Pt presented to ER on 10/28/24 with complaints of left sided chest pain that woke her up from sleep.  Patient said she went out and had ice cream for her daughter's birthday.  After that she had some indigestion.  She said she went to bed and then woke up with a crushing pressure sensation in her chest that radiated to her jaw.  Contacted EMS because the intensity.  They gave her nitroglycerin in the ambulance on the way to the hospital which did help the discomfort.  She still continues to have a tightness sensation in her chest this morning.  She had has reflux disease in the past and says this morning it feels a lot like reflux issues.  She was on a PPI in the past but has not recently been taking it.    She denied any shortness of breath, nausea, vomiting or diaphoresis.  Patient denied fevers, chills or abdominal pain. In ER, troponin T negative, pro BNP <36, procal 0.03, CXR showed Nonspecific prominence of the perihilar interstitium. Pneumonia is not excluded. EKG showed SR rate 60, patient has not had any recent illnesses.  Patient had no chest discomforts she did have a little nausea with the symptoms.     I have been asked to see for chest pain.        Past Medical History:  Past Medical History:   Diagnosis Date     Allergic     Seasonal, Sinus issues    Asthma Early childhood    Borderline Asthma    Clotting disorder     Factor V leiden    Degenerative disc disease at L5-S1 level 2017    MRI 2017 2 mm disk bulge    GERD (gastroesophageal reflux disease)     Reflux occasionally    Headache     Triggers avoided, take Magnesium nightly, prn Ubrelvy    Low back pain     Lumbar disc herniation,  sciatica    Lumbar herniated disc 2014    MRI L4-5 mild also mild retrolisthesis    Microscopic hematuria     cystoscopy and MRI by Dr Rees 2015    Pneumonia     Covid Pneumonia     PONV (postoperative nausea and vomiting)     Pregnancy         Raynaud's disease        Past Surgical History:   Procedure Laterality Date    COLONOSCOPY N/A 2024    ENTIRE COLON WNL, RESCOPE IN 10 YRS, DR. LOVE HENNING AT [facility]    HYSTEROSCOPY      2016    MYOMECTOMY ABDOMINAL APPROACH  2017    Laparoscopic       Home Medications:   Medications Prior to Admission   Medication Sig Dispense Refill Last Dose/Taking    cetirizine (zyrTEC) 10 MG tablet Take 1 tablet by mouth.   10/27/2024    Cholecalciferol (Vitamin D3) 50 MCG (2000 UT) capsule Take 1 capsule by mouth Daily.   10/27/2024    EVENING PRIMROSE OIL PO Take  by mouth.   10/27/2024    Magnesium 500 MG capsule Take 1 capsule by mouth Every Night.   10/27/2024    NON FORMULARY Hops flower supplement   10/27/2024    Probiotic Product (PROBIOTIC PO) Take 1 tablet by mouth Daily.   10/27/2024    Triamcinolone Acetonide (NASACORT) 55 MCG/ACT nasal inhaler 1 spray Daily.   10/27/2024    TURMERIC PO Take 600 mg by mouth Daily.   10/27/2024    ubrogepant 100 MG tablet 1 tablet by Nasogastric route As Needed. (Patient taking differently: Take 1 tablet by mouth As Needed. Pt has never had to take)   Patient Taking Differently    albuterol sulfate  (90 Base) MCG/ACT inhaler Every 4 (Four) Hours.          Allergies:  Allergies   Allergen Reactions    Nedocromil Shortness Of  "Breath    Pseudoephedrine Anxiety and Other (See Comments)     Tachycardia 160's, \"felt like a heart attack\"       Past Social History:  Social History     Socioeconomic History    Marital status:    Tobacco Use    Smoking status: Never    Smokeless tobacco: Never   Vaping Use    Vaping status: Never Used   Substance and Sexual Activity    Alcohol use: Yes     Alcohol/week: 2.0 standard drinks of alcohol     Types: 2 Glasses of wine per week    Drug use: Never    Sexual activity: Yes     Partners: Male     Birth control/protection: None       Past Family History: History reviewed. No pertinent family history.   Family History   Problem Relation Age of Onset    Kidney cancer Father 55    Thrombophilia Father         Fastor V Leiden    Deep vein thrombosis Father     Asthma Father     Cancer Father         Renal Cell Carcinoma- also present in one of his siblings    Breast cancer Maternal Aunt     Cancer Maternal Aunt         Metastatic breast cancer    Kidney cancer Paternal Aunt 55    Stroke Maternal Grandmother     Heart attack Paternal Grandfather     Malig Hyperthermia Neg Hx        Review of Systems        Objective:     Objective:  Temp:  [97.8 °F (36.6 °C)-97.9 °F (36.6 °C)] 97.9 °F (36.6 °C)  Heart Rate:  [51-67] 51  Resp:  [16] 16  BP: (100-156)/(69-86) 114/72  No intake or output data in the 24 hours ending 10/28/24 0833  Body mass index is 26.71 kg/m².      10/28/24  0226 10/28/24  0635   Weight: 74.5 kg (164 lb 3.9 oz) 72.8 kg (160 lb 8 oz)           Physical Exam:   Vitals reviewed.   Constitutional:       Appearance: Healthy appearance.   Pulmonary:      Effort: Pulmonary effort is normal.   Cardiovascular:      Normal rate. Regular rhythm. Normal S1. Normal S2.       Murmurs: There is no murmur.      No gallop.  No click. No rub.   Pulses:     Intact distal pulses.   Edema:     Peripheral edema absent.   Neurological:      Mental Status: Alert and oriented to person, place and time.      "     Labs:   Lab Review:     Results from last 7 days   Lab Units 10/28/24  0235   SODIUM mmol/L 140   POTASSIUM mmol/L 3.5   CHLORIDE mmol/L 105   CO2 mmol/L 25.0   BUN mg/dL 15   CREATININE mg/dL 0.81   GLUCOSE mg/dL 89   CALCIUM mg/dL 9.9   AST (SGOT) U/L 18   ALT (SGPT) U/L 15     Results from last 7 days   Lab Units 10/28/24  0436 10/28/24  0235   HSTROP T ng/L <6 <6     Results from last 7 days   Lab Units 10/28/24  0235   WBC 10*3/mm3 6.86   HEMOGLOBIN g/dL 14.1   HEMATOCRIT % 42.9   PLATELETS 10*3/mm3 277             Results from last 7 days   Lab Units 10/28/24  0235   MAGNESIUM mg/dL 2.2         Results from last 7 days   Lab Units 10/28/24  0235   PROBNP pg/mL <36.0                 PREVIOUS EKG:          EKG:             Assessment:       Chest pain        Plan:     1.  Chest discomfort patient did have some indigestion before she went to bed.  She woke up with very intense pain that was relieved with nitroglycerin.  This could have easily been esophageal spasm in which nitroglycerin will also relieve the discomfort from that.  She said this morning it feels very much like the indigestion she has had in the past.  Patient exercises on a routine basis.  She does maximum incline on her treadmill at 3 miles an hour for 30 to 60 minutes.  She did yesterday morning without any difficulties.  Her troponins are negative her ECG is completely normal in fact it looks better than that in 2021.  In light of that I would treat her for reflux and possible gastritis which she has had in the past.  Have her follow-up with my office and we could reassess.  Have her come back and see MIYA Liu in 1 to 2 weeks I told her to contact her office if she has any more problems.  Okay to discharge from my standpoint on a PPI.    Thank you for allowing me to participate in the care of Claudia KARIN Price. Feel free to contact me directly with any further questions or concerns.    Gabriel Taylor MD  [facility]  10/28/24  08:33 EDT

## 2024-10-28 NOTE — DISCHARGE SUMMARY
ED OBSERVATION PROGRESS/DISCHARGE SUMMARY    Date of Admission: 10/28/2024   LOS: 0 days   PCP: Galina Blankenship MD    Final Diagnosis chest pain      Subjective     Hospital Outcome:     Patient is a pleasant afebrile ambulatory 49-year-old  female who works here as a PACU nurse she has been admitted to the ED observation unit for epigastric chest discomfort she reports is associate with nausea, left shoulder and left jaw squeezing discomfort states it has woken her up from sleep.  She denies any immediate family history of coronary artery disease.    She had 2 negative high-sensitivity troponins and this morning reports that she is not having any recurrence of discomfort.    Seen and evaluated by Dr. Taylor with the cardiology service advises the patient has chest discomfort that was relieved with nitro may point more towards a gastrointestinal etiology of discomfort.    Patient advises that she has been a vegetarian for the last 25 years and that her primary care provider is following her LDL cholesterol.  She was able to tolerate p.o. without any difficulty this afternoon but did subsequently endorse some epigastric discomfort which was treated with IV Pepcid.  She states she previously was on Protonix for gastroesophageal reflux disease, will discharge patient home with a prescription for Protonix 40 mg daily and have her touch base with her primary care provider.  Patient is agreeable to plan      ROS:  General: no fevers, chills  Respiratory: no cough, dyspnea  Cardiovascular: no chest pain, palpitations  Abdomen: + abdominal pain, nausea, vomiting, or diarrhea  Neurologic: No focal weakness    Objective   Physical Exam:  I have reviewed the vital signs.  Temp:  [97.8 °F (36.6 °C)-97.9 °F (36.6 °C)] 97.9 °F (36.6 °C)  Heart Rate:  [51-67] 51  Resp:  [16] 16  BP: (100-156)/(69-86) 114/72  General Appearance:    Alert, cooperative, no distress  Head:    Normocephalic, atraumatic  Eyes:     Sclerae anicteric  Neck:   Supple, no mass  Lungs: Clear to auscultation bilaterally, respirations unlabored  Heart: Regular rate and rhythm, S1 and S2 normal, no murmur, rub or gallop  Abdomen:  Soft, nontender, bowel sounds active, nondistended  Extremities: No clubbing, cyanosis, or edema to lower extremities  Pulses:  2+ and symmetric in distal lower extremities  Skin: No rashes   Neurologic: Oriented x3, Normal strength to extremities    Results Review:    I have reviewed the labs, radiology results and diagnostic studies.    Results from last 7 days   Lab Units 10/28/24  0235   WBC 10*3/mm3 6.86   HEMOGLOBIN g/dL 14.1   HEMATOCRIT % 42.9   PLATELETS 10*3/mm3 277     Results from last 7 days   Lab Units 10/28/24  0235   SODIUM mmol/L 140   POTASSIUM mmol/L 3.5   CHLORIDE mmol/L 105   CO2 mmol/L 25.0   BUN mg/dL 15   CREATININE mg/dL 0.81   CALCIUM mg/dL 9.9   BILIRUBIN mg/dL <0.2   ALK PHOS U/L 82   ALT (SGPT) U/L 15   AST (SGOT) U/L 18   GLUCOSE mg/dL 89     Imaging Results (Last 24 Hours)       Procedure Component Value Units Date/Time    XR Chest 1 View [[id number]] Collected: 10/28/24 0244     Updated: 10/28/24 0249    Narrative:      SINGLE VIEW OF THE CHEST     HISTORY: Chest pain     COMPARISON: November 4, 2021     FINDINGS:  There is cardiomegaly. There is no definite vascular congestion. There  is some prominence of the perihilar interstitium. This may reflect  pneumonia. No pneumothorax or pleural effusion is seen.       Impression:      Nonspecific prominence of the perihilar interstitium. Pneumonia is not  excluded.     This report was finalized on 10/28/2024 2:46 AM by Dr. Jaida Proctor M.D on Workstation: [id number]               I have reviewed the medications.  ---------------------------------------------------------------------------------------------  Assessment & Plan   Assessment/Problem List    Chest pain      Plan:      Chest pain  -initial troponin less than 6, repeat troponin  less than 6  -proBNP negative,   -CBC and CMP largely unremarkable for acute findings.    -Procalcitonin negative.    -Chest x-ray shows nonspecific prominence of the perihilar interstitium.  Pneumonia is not excluded.   - EKG shows sinus rhythm 60 bpm, no ST elevation apparent.    -Patient is afebrile, pulse in the 50s, on room air oxygen 96% SpO2 and blood pressure normotensive.    -Patient was given full dose aspirin, Nitropaste and morphine and Zofran.  -Cardiology consult, cleared for discharge home with outpatient follow-up with gastroenterology  -GI cocktail and Carafate given patient tolerated well  -Repeat troponin, EKG  -Continuous cardiac monitoring uneventful     Asthma  -No acute exacerbation, continue home inhalers    Disposition: Home    Follow-up after Discharge: PCP, GI & cardiology    This note will serve as a discharge summary    MIYA Schrader 10/28/24 07:17 EDT    I have worn appropriate PPE during this patient encounter, sanitized my hands both with entering and exiting patient's room.      40 minutes has been spent by [facility] providers in the care of this patient while under observation status

## 2024-10-28 NOTE — PROGRESS NOTES
NENA WADE Attestation Note    I supervised care provided by the midlevel provider.    The [facility] and I have discussed this patient's history, physical exam, and treatment plan. I have reviewed the documentation and personally had a face to face interaction with the patient  I affirm the documentation and agree with the treatment and plan. I provided a substantive portion of the care of this patient.  I personally performed the physical exam, in its entirety.  My personal findings are documented in below:    History:  49-year-old female admitted to the observation unit for further evaluation and management of chest pain.  Symptoms woke her from sleep and were located in the left side of her chest with radiation into her neck and back.  Patient does have a history of GERD and states she has been having some reflux symptoms earlier in the day.  Patient states episode and symptoms were more severe than she is typically experienced with reflux in the past.  Workup in the emergency department notable for troponin and repeat troponin in the were less than 6, nonischemic EKG.    Physical Exam:  General: No acute distress.  HENT: NCAT, PERRL, Nares patent.  Eyes: no scleral icterus.  Neck: trachea midline, no ROM limitations.  CV: Pink warm and well-perfused throughout  Respiratory: No distress or increased work of breathing  Abdomen: soft, no focal tenderness or rigidity  Musculoskeletal: no deformity.  Neuro: alert, moves all extremities, follows commands.  Skin: warm, dry.    Assessment and Plan:  49-year-old female admitted to the observation unit for further evaluation and management of chest pain  - Symptoms improved, now with just burning sensation in chest  - Cardiology consult  - Suspect esophageal spasm  - Treat with GI cocktail/Carafate and acid reducer  - Discharged follow-up on outpatient basis.

## 2024-10-28 NOTE — ED PROVIDER NOTES
EMERGENCY DEPARTMENT ENCOUNTER    Room Number:  108/1  PCP: Galina Blankenship MD  Patient Care Team:  Galina Blankenship MD as PCP - General (Family Medicine)   Independent Historians: Patient    HPI:  Chief Complaint: Chest pain    A complete HPI/ROS/PMH/PSH/SH/FH are unobtainable due to: None    Chronic or social conditions impacting patient care (Social Determinants of Health): None  (Financial Resource Strain / Food Insecurity / Transportation Needs / Physical Activity / Stress / Social Connections / Intimate Partner Violence / Housing Stability)    Context: Claudia Price is a 49 y.o. female who presents to the ED c/o acute left-sided chest pain awoke from sleep by [place].  Describes a crushing sensation.  Pain improved with sublingual nitro per EMS.  Patient denies any shortness of breath no nausea vomiting no diaphoresis.  Patient does not have significant past cardiac history.  Does have a history of hyperlipidemia.  No fevers or chills no abdominal pain.  States the pain radiates up to her left jaw.    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/17/2018    H/O myomectomy 11/22/2017    Heterozygous factor V Leiden mutation 06/06/2018    Factor 5 Leiden mutation, heterozygous 07/01/2019    Pneumonia due to COVID-19 virus 10/25/2021    Acute respiratory failure with hypoxia  10/30/2021    Steroid-induced hyperglycemia 10/30/2021    Elevated LFTs 11/01/2021    Mucositis oral 11/02/2021    Cytokine release syndrome, grade 2 11/03/2021    Superficial thrombophlebitis 11/08/2021    Screening for colon cancer 02/07/2024     Resolved Ambulatory Problems     Diagnosis Date Noted    Degenerative disc disease at L5-S1 level 02/02/2017    New daily persistent headache 07/27/2017     Past Medical History:   Diagnosis Date    Allergic     Asthma Early childhood    Clotting disorder     GERD (gastroesophageal reflux disease)     Headache     Low back pain     Lumbar herniated disc 04/2014    Microscopic hematuria     Pneumonia     PONV (postoperative nausea and vomiting)     Pregnancy     Raynaud's disease          PAST SURGICAL HISTORY  Past Surgical History:   Procedure Laterality Date    COLONOSCOPY N/A 05/22/2024    ENTIRE COLON WNL, RESCOPE IN 10 YRS, DR. LOVE HENNING AT [facility]    HYSTEROSCOPY      04/2016    MYOMECTOMY ABDOMINAL APPROACH  05/2017    Laparoscopic         FAMILY HISTORY  Family History   Problem Relation Age of Onset    Kidney cancer Father 55    Thrombophilia Father         Fastor V Leiden    Deep vein thrombosis Father     Asthma Father     Cancer Father         Renal Cell Carcinoma- also present in one of his siblings    Breast cancer Maternal Aunt     Cancer Maternal Aunt         Metastatic breast cancer    Kidney cancer Paternal Aunt 55    Stroke Maternal Grandmother     Heart attack Paternal Grandfather     Malig Hyperthermia Neg Hx          SOCIAL HISTORY  Social History     Socioeconomic History    Marital status:    Tobacco Use    Smoking status: Never    Smokeless tobacco: Never   Vaping Use    Vaping status: Never Used   Substance and Sexual Activity    Alcohol use: Yes     Alcohol/week: 2.0 standard drinks of alcohol     Types: 2 Glasses of wine per week     Comment: social    Drug use: Never    Sexual activity: Yes     Partners: Male     Birth  control/protection: None         ALLERGIES  Nedocromil and Pseudoephedrine        REVIEW OF SYSTEMS  Review of Systems  Included in HPI  All systems reviewed and negative except for those discussed in HPI.      PHYSICAL EXAM    I have reviewed the triage vital signs and nursing notes.    ED Triage Vitals   Temp Pulse Resp BP SpO2   -- -- -- -- --      Temp src Heart Rate Source Patient Position BP Location FiO2 (%)   -- -- -- -- --       Physical Exam  GENERAL: alert, no acute distress  SKIN: Warm, dry  HENT: Normocephalic, atraumatic  EYES: no scleral icterus  CV: regular rhythm, regular rate  RESPIRATORY: normal effort, lungs clear  ABDOMEN: soft, nontender, nondistended  MUSCULOSKELETAL: no deformity  NEURO: alert, moves all extremities, follows commands                                                               LAB RESULTS  Recent Results (from the past 24 hours)   ECG 12 Lead ED Triage Standing Order; Chest Pain    Collection Time: 10/28/24  2:25 AM   Result Value Ref Range    QT Interval 435 ms    QTC Interval 435 ms   Comprehensive Metabolic Panel    Collection Time: 10/28/24  2:35 AM    Specimen: Blood   Result Value Ref Range    Glucose 89 65 - 99 mg/dL    BUN 15 6 - 20 mg/dL    Creatinine 0.81 0.57 - 1.00 mg/dL    Sodium 140 136 - 145 mmol/L    Potassium 3.5 3.5 - 5.2 mmol/L    Chloride 105 98 - 107 mmol/L    CO2 25.0 22.0 - 29.0 mmol/L    Calcium 9.9 8.6 - 10.5 mg/dL    Total Protein 7.4 6.0 - 8.5 g/dL    Albumin 4.3 3.5 - 5.2 g/dL    ALT (SGPT) 15 1 - 33 U/L    AST (SGOT) 18 1 - 32 U/L    Alkaline Phosphatase 82 39 - 117 U/L    Total Bilirubin <0.2 0.0 - 1.2 mg/dL    Globulin 3.1 gm/dL    A/G Ratio 1.4 g/dL    BUN/Creatinine Ratio 18.5 7.0 - 25.0    Anion Gap 10.0 5.0 - 15.0 mmol/L    eGFR 88.6 >60.0 mL/min/1.73   High Sensitivity Troponin T    Collection Time: 10/28/24  2:35 AM    Specimen: Blood   Result Value Ref Range    HS Troponin T <6 <14 ng/L   Green Top (Gel)    Collection Time: 10/28/24   2:35 AM   Result Value Ref Range    Extra Tube Hold for add-ons.    Lavender Top    Collection Time: 10/28/24  2:35 AM   Result Value Ref Range    Extra Tube hold for add-on    Gold Top - SST    Collection Time: 10/28/24  2:35 AM   Result Value Ref Range    Extra Tube Hold for add-ons.    Light Blue Top    Collection Time: 10/28/24  2:35 AM   Result Value Ref Range    Extra Tube Hold for add-ons.    CBC Auto Differential    Collection Time: 10/28/24  2:35 AM    Specimen: Blood   Result Value Ref Range    WBC 6.86 3.40 - 10.80 10*3/mm3    RBC 5.14 3.77 - 5.28 10*6/mm3    Hemoglobin 14.1 12.0 - 15.9 g/dL    Hematocrit 42.9 34.0 - 46.6 %    MCV 83.5 79.0 - 97.0 fL    MCH 27.4 26.6 - 33.0 pg    MCHC 32.9 31.5 - 35.7 g/dL    RDW 12.9 12.3 - 15.4 %    RDW-SD 39.6 37.0 - 54.0 fl    MPV 9.9 6.0 - 12.0 fL    Platelets 277 140 - 450 10*3/mm3    Neutrophil % 44.2 42.7 - 76.0 %    Lymphocyte % 40.8 19.6 - 45.3 %    Monocyte % 8.2 5.0 - 12.0 %    Eosinophil % 5.7 0.3 - 6.2 %    Basophil % 1.0 0.0 - 1.5 %    Immature Grans % 0.1 0.0 - 0.5 %    Neutrophils, Absolute 3.03 1.70 - 7.00 10*3/mm3    Lymphocytes, Absolute 2.80 0.70 - 3.10 10*3/mm3    Monocytes, Absolute 0.56 0.10 - 0.90 10*3/mm3    Eosinophils, Absolute 0.39 0.00 - 0.40 10*3/mm3    Basophils, Absolute 0.07 0.00 - 0.20 10*3/mm3    Immature Grans, Absolute 0.01 0.00 - 0.05 10*3/mm3    nRBC 0.0 0.0 - 0.2 /100 WBC   BNP    Collection Time: 10/28/24  2:35 AM    Specimen: Blood   Result Value Ref Range    proBNP <36.0 0.0 - 900.0 pg/mL   Magnesium    Collection Time: 10/28/24  2:35 AM    Specimen: Blood   Result Value Ref Range    Magnesium 2.2 1.6 - 2.6 mg/dL   Procalcitonin    Collection Time: 10/28/24  2:35 AM    Specimen: Blood   Result Value Ref Range    Procalcitonin 0.03 0.00 - 0.25 ng/mL   High Sensitivity Troponin T 2Hr    Collection Time: 10/28/24  4:36 AM    Specimen: Arm, Left; Blood   Result Value Ref Range    HS Troponin T <6 <14 ng/L    Troponin T Delta          I ordered the above labs and independently reviewed the results.        RADIOLOGY  XR Chest 1 View    Result Date: 10/28/2024  SINGLE VIEW OF THE CHEST  HISTORY: Chest pain  COMPARISON: November 4, 2021  FINDINGS: There is cardiomegaly. There is no definite vascular congestion. There is some prominence of the perihilar interstitium. This may reflect pneumonia. No pneumothorax or pleural effusion is seen.      Nonspecific prominence of the perihilar interstitium. Pneumonia is not excluded.  This report was finalized on 10/28/2024 2:46 AM by Dr. Jaida Proctor M.D on Workstation: [id number]       I ordered the above noted radiological studies. Reviewed by me and discussed with radiologist.  See dictation for official radiology interpretation.      PROCEDURES    Procedures      MEDICATIONS GIVEN IN ER    Medications   sodium chloride 0.9 % flush 10 mL (has no administration in time range)   nitroglycerin (NITROSTAT) SL tablet 0.4 mg (has no administration in time range)   sodium chloride 0.9 % flush 10 mL (has no administration in time range)   sodium chloride 0.9 % flush 10 mL (has no administration in time range)   acetaminophen (TYLENOL) tablet 650 mg (has no administration in time range)   sennosides-docusate (PERICOLACE) 8.6-50 MG per tablet 2 tablet (has no administration in time range)     And   polyethylene glycol (MIRALAX) packet 17 g (has no administration in time range)     And   bisacodyl (DULCOLAX) EC tablet 5 mg (has no administration in time range)     And   bisacodyl (DULCOLAX) suppository 10 mg (has no administration in time range)   Potassium Replacement - Follow Nurse / BPA Driven Protocol (has no administration in time range)   Magnesium Standard Dose Replacement - Follow Nurse / BPA Driven Protocol (has no administration in time range)   Phosphorus Replacement - Follow Nurse / BPA Driven Protocol (has no administration in time range)   Calcium Replacement - Follow Nurse / BPA Driven  Protocol (has no administration in time range)   ondansetron ODT (ZOFRAN-ODT) disintegrating tablet 4 mg (has no administration in time range)     Or   ondansetron (ZOFRAN) injection 4 mg (has no administration in time range)   melatonin tablet 5 mg (has no administration in time range)   potassium chloride (K-DUR,KLOR-CON) ER tablet 40 mEq (has no administration in time range)   potassium chloride (K-DUR,KLOR-CON) ER tablet 40 mEq (has no administration in time range)   aspirin tablet 325 mg (325 mg Oral Given 10/28/24 0246)   nitroglycerin (NITROSTAT) ointment 1 inch (1 inch Topical Given 10/28/24 0248)   morphine injection 2 mg (2 mg Intravenous Given 10/28/24 0335)   ondansetron (ZOFRAN) injection 4 mg (4 mg Intravenous Given 10/28/24 0250)         ORDERS PLACED DURING THIS VISIT:  Orders Placed This Encounter   Procedures    XR Chest 1 View    [place] Draw    Comprehensive Metabolic Panel    High Sensitivity Troponin T    CBC Auto Differential    BNP    Magnesium    Procalcitonin    High Sensitivity Troponin T 2Hr    Basic Metabolic Panel    High Sensitivity Troponin T    Potassium    Potassium    NPO Diet NPO Type: Strict NPO    Undress & Gown    Continuous Pulse Oximetry    Vital Signs    Telemetry - Place Orders & Notify Provider of Results When Patient Experiences Acute Chest Pain, Dysrhythmia or Respiratory Distress    May Be Off Telemetry for Tests    Notify Provider (With Default Parameters)    Activity - Ad Ju    Intake & Output    Weigh Patient    Oral Care    Saline Lock & Maintain IV Access    Place Sequential Compression Device    Maintain Sequential Compression Device    Code Status and Medical Interventions: CPR (Attempt to Resuscitate); Full Support    Inpatient Cardiology Consult    Oxygen Therapy- Nasal Cannula; Titrate 1-6 LPM Per SpO2; 90 - 95%    ECG 12 Lead ED Triage Standing Order; Chest Pain    ECG 12 Lead ED Triage Standing Order; Chest Pain    ECG 12 Lead Chest Pain    Insert  Peripheral IV    Insert Peripheral IV    Initiate ED Observation Status    CBC & Differential    Green Top (Gel)    Lavender Top    Gold Top - SST    Light Blue Top    CBC & Differential         PROGRESS, DATA ANALYSIS, CONSULTS, AND MEDICAL DECISION MAKING    All labs have been independently interpreted by me.  All radiology studies have been reviewed by me and discussed with radiologist dictating the report.   EKG's independently viewed and interpreted by me.  Discussion below represents my analysis of pertinent findings related to patient's condition, differential diagnosis, treatment plan and final disposition.    My differential diagnosis for chest pain includes but is not limited to:  Muscle strain, costochondritis, myositis, pleurisy, rib fracture, intercostal neuritis, herpes zoster, tumor, myocardial infarction, coronary syndrome, unstable angina, angina, aortic dissection, mitral valve prolapse, pericarditis, palpitations, pulmonary embolus, pneumonia, pneumothorax, lung cancer, GERD, esophagitis, esophageal spasm  .    Clinical Scores:              ED Course as of 10/28/24 0652   Mon Oct 28, 2024   0227 EKG          EKG time: 0225  Rhythm/Rate: Sinus rhythm rate 60  P waves and TX: Normal  QRS, axis: Narrow regular  ST and T waves: Within normal limits    Interpreted Contemporaneously by me, independently viewed [TJ]   0317 I discussed findings with patient and her family.  They are agreeable for admission for cardiology consult secondary to classic sounding unstable angina. [TJ]   0541 Discussed with observation unit.  Happy to admit the patient. [TJ]      ED Course User Index  [TJ] Satya Jasmine MD               PPE: The patient wore a mask and I wore an N95 mask throughout the entire patient encounter.       AS OF 06:52 EDT VITALS:    BP - 114/72  HR - 51  TEMP - 97.9 °F (36.6 °C) (Oral)  O2 SATS - 97%        DIAGNOSIS  Final diagnoses:   Chest pain, unspecified type         DISPOSITION  ED  Disposition       ED Disposition   Decision to Admit    Condition   --    Comment   --                  Note Disclaimer: At [facility], we believe that sharing information builds trust and better relationships. You are receiving this note because you recently visited [facility]. It is possible you will see health information before a provider has talked with you about it. This kind of information can be easy to misunderstand. To help you fully understand what it means for your health, we urge you to discuss this note with your provider.         Satya Jasmine MD  10/28/24 0652

## 2024-10-28 NOTE — PLAN OF CARE
Goal Outcome Evaluation:              Outcome Evaluation: pt was discharged with all belongings and discharge paperwork, pt is aox4, vss, pt to follow up with pcp ans gi as indicated, pt to  medications from preferred pharmacy, pt had no further questions at this time,  provided transport

## 2024-10-28 NOTE — CASE MANAGEMENT/SOCIAL WORK
Case Management Discharge Note      Final Note: Discharge to home. Inna BECKHAM RN CCP    Provided Post Acute Provider List?: N/A  N/A Provider List Comment: No needs identified  Provided Post Acute Provider Quality & Resource List?: N/A    Selected Continued Care - Discharged on 10/28/2024 Admission date: 10/28/2024 - Discharge disposition: Home or Self Care      Destination    No services have been selected for the patient.                Durable Medical Equipment    No services have been selected for the patient.                Dialysis/Infusion    No services have been selected for the patient.                Home Medical Care    No services have been selected for the patient.                Therapy    No services have been selected for the patient.                Community Resources    No services have been selected for the patient.                Community & DME    No services have been selected for the patient.                    Transportation Services  Private: Car    Final Discharge Disposition Code: 01 - home or self-care

## 2024-10-28 NOTE — ED NOTES
"Nursing report ED to floor  Claudia Price  49 y.o.  female    HPI :  HPI  Stated Reason for Visit: Pt presents to ED from home via [facility] with complaints of left sided chest pain that she describes as a squeezing/crushing sensation that began about 1 hour PTA. Pt states the pain radiates into her left jaw-pain is \"better\" after nitro but pain is still present. +associated SOA with exertion  History Obtained From: patient    Chief Complaint  Chief Complaint   Patient presents with    Chest Pain       Admitting doctor:   Giuseppe Carrion MD    Admitting diagnosis:   The encounter diagnosis was Chest pain, unspecified type.    Code status:   Current Code Status       Date Active Code Status Order ID Comments User Context       10/28/2024 0548 CPR (Attempt to Resuscitate) [id number]  [facility] Sotelo [facility] ED        Question Answer    Code Status (Patient has no pulse and is not breathing) CPR (Attempt to Resuscitate)    Medical Interventions (Patient has pulse or is breathing) Full Support                    Allergies:   Nedocromil and Pseudoephedrine    Isolation:   No active isolations    Intake and Output  No intake or output data in the 24 hours ending 10/28/24 0553    Weight:       10/28/24  0226   Weight: 74.5 kg (164 lb 3.9 oz)       Most recent vitals:   Vitals:    10/28/24 0226 10/28/24 0253 10/28/24 0404 10/28/24 0500   BP: 156/86 115/75 117/71 101/69   BP Location: Right arm      Patient Position: Sitting      Pulse: 64 57 51 58   Resp: 16      Temp: 97.8 °F (36.6 °C)      TempSrc: Tympanic      SpO2: 96% 98% 95% 96%   Weight: 74.5 kg (164 lb 3.9 oz)      Height: 165.1 cm (65\")          Active LDAs/IV Access:   Lines, Drains & Airways       Active LDAs       Name Placement date Placement time Site Days    Peripheral IV 10/28/24 0234 Right Antecubital 10/28/24  0234  Antecubital  less than 1                    Labs (abnormal labs have a star):   Labs Reviewed   TROPONIN - Normal    Narrative:     High " Sensitive Troponin T Reference Range:  <14.0 ng/L- Negative Female for AMI  <22.0 ng/L- Negative Male for AMI  >=14 - Abnormal Female indicating possible myocardial injury.  >=22 - Abnormal Male indicating possible myocardial injury.   Clinicians would have to utilize clinical acumen, EKG, Troponin, and serial changes to determine if it is an Acute Myocardial Infarction or myocardial injury due to an underlying chronic condition.        CBC WITH AUTO DIFFERENTIAL - Normal   BNP (IN-HOUSE) - Normal    Narrative:     This assay is used as an aid in the diagnosis of individuals suspected of having heart failure. It can be used as an aid in the diagnosis of acute decompensated heart failure (ADHF) in patients presenting with signs and symptoms of ADHF to the emergency department (ED). In addition, NT-proBNP of <300 pg/mL indicates ADHF is not likely.    Age Range Result Interpretation  NT-proBNP Concentration (pg/mL:      <50             Positive            >450                   Gray                 300-450                    Negative             <300    50-75           Positive            >900                  Gray                300-900                  Negative            <300      >75             Positive            >1800                  Gray                300-1800                  Negative            <300   MAGNESIUM - Normal   PROCALCITONIN - Normal    Narrative:     As a Marker for Sepsis (Non-Neonates):    1. <0.5 ng/mL represents a low risk of severe sepsis and/or septic shock.  2. >2 ng/mL represents a high risk of severe sepsis and/or septic shock.    As a Marker for Lower Respiratory Tract Infections that require antibiotic therapy:    PCT on Admission    Antibiotic Therapy       6-12 Hrs later    >0.5                Strongly Recommended  >0.25 - <0.5        Recommended   0.1 - 0.25          Discouraged              Remeasure/reassess PCT  <0.1                Strongly Discouraged     Remeasure/reassess  "PCT    As 28 day mortality risk marker: \"Change in Procalcitonin Result\" (>80% or <=80%) if Day 0 (or Day 1) and Day 4 values are available. Refer to http://[email or web address]-pct-calculator.com    Change in PCT <=80%  A decrease of PCT levels below or equal to 80% defines a positive change in PCT test result representing a higher risk for 28-day all-cause mortality of patients diagnosed with severe sepsis for septic shock.    Change in PCT >80%  A decrease of PCT levels of more than 80% defines a negative change in PCT result representing a lower risk for 28-day all-cause mortality of patients diagnosed with severe sepsis or septic shock.      RAINBOW DRAW    Narrative:     The following orders were created for panel order [place] Draw.  Procedure                               Abnormality         Status                     ---------                               -----------         ------                     Green Top (Gel)[[id number]]                                  Final result               Lavender Top[[id number]]                                     Final result               Gold Top - SST[[id number]]                                   Final result               Light Blue Top[[id number]]                                   Final result                 Please view results for these tests on the individual orders.   COMPREHENSIVE METABOLIC PANEL    Narrative:     GFR Normal >60  Chronic Kidney Disease <60  Kidney Failure <15     HIGH SENSITIVITIY TROPONIN T 2HR    Narrative:     High Sensitive Troponin T Reference Range:  <14.0 ng/L- Negative Female for AMI  <22.0 ng/L- Negative Male for AMI  >=14 - Abnormal Female indicating possible myocardial injury.  >=22 - Abnormal Male indicating possible myocardial injury.   Clinicians would have to utilize clinical acumen, EKG, Troponin, and serial changes to determine if it is an Acute Myocardial Infarction or myocardial injury due to an underlying chronic condition.        TROPONIN "   CBC AND DIFFERENTIAL    Narrative:     The following orders were created for panel order CBC & Differential.  Procedure                               Abnormality         Status                     ---------                               -----------         ------                     CBC Auto Differential[[id number]]        Normal              Final result                 Please view results for these tests on the individual orders.   GREEN TOP   LAVENDER TOP   GOLD TOP - SST   LIGHT BLUE TOP       EKG:   ECG 12 Lead ED Triage Standing Order; Chest Pain   Preliminary Result   HEART RATE=60  bpm   RR Gdxrtflz=1873  ms   SC Siulbkec=160  ms   P Horizontal Axis=42  deg   P Front Axis=42  deg   QRSD Interval=88  ms   QT Xmskjydy=577  ms   TWuA=593  ms   QRS Axis=62  deg   T Wave Axis=55  deg   - NORMAL ECG -   Sinus rhythm   Date and Time of Study:2024-10-28 02:25:41          Meds given in ED:   Medications   sodium chloride 0.9 % flush 10 mL (has no administration in time range)   nitroglycerin (NITROSTAT) SL tablet 0.4 mg (has no administration in time range)   sodium chloride 0.9 % flush 10 mL (has no administration in time range)   sodium chloride 0.9 % flush 10 mL (has no administration in time range)   acetaminophen (TYLENOL) tablet 650 mg (has no administration in time range)   sennosides-docusate (PERICOLACE) 8.6-50 MG per tablet 2 tablet (has no administration in time range)     And   polyethylene glycol (MIRALAX) packet 17 g (has no administration in time range)     And   bisacodyl (DULCOLAX) EC tablet 5 mg (has no administration in time range)     And   bisacodyl (DULCOLAX) suppository 10 mg (has no administration in time range)   Potassium Replacement - Follow Nurse / BPA Driven Protocol (has no administration in time range)   Magnesium Standard Dose Replacement - Follow Nurse / BPA Driven Protocol (has no administration in time range)   Phosphorus Replacement - Follow Nurse / BPA Driven Protocol (has no  administration in time range)   Calcium Replacement - Follow Nurse / BPA Driven Protocol (has no administration in time range)   ondansetron ODT (ZOFRAN-ODT) disintegrating tablet 4 mg (has no administration in time range)     Or   ondansetron (ZOFRAN) injection 4 mg (has no administration in time range)   melatonin tablet 5 mg (has no administration in time range)   aspirin tablet 325 mg (325 mg Oral Given 10/28/24 0246)   nitroglycerin (NITROSTAT) ointment 1 inch (1 inch Topical Given 10/28/24 0248)   morphine injection 2 mg (2 mg Intravenous Given 10/28/24 0335)   ondansetron (ZOFRAN) injection 4 mg (4 mg Intravenous Given 10/28/24 0250)       Imaging results:  XR Chest 1 View    Result Date: 10/28/2024  Nonspecific prominence of the perihilar interstitium. Pneumonia is not excluded.  This report was finalized on 10/28/2024 2:46 AM by Dr. Jaida Proctor M.D on Workstation: [id number]       Ambulatory status:   - ad eve    Social issues:   Social History     Socioeconomic History    Marital status:    Tobacco Use    Smoking status: Never    Smokeless tobacco: Never   Vaping Use    Vaping status: Never Used   Substance and Sexual Activity    Alcohol use: Yes     Alcohol/week: 2.0 standard drinks of alcohol     Types: 2 Glasses of wine per week     Comment: social    Drug use: Never    Sexual activity: Yes     Partners: Male     Birth control/protection: None       Peripheral Neurovascular  Peripheral Neurovascular (Adult)  Peripheral Neurovascular WDL: WDL    Neuro Cognitive  Neuro Cognitive (Adult)  Cognitive/Neuro/Behavioral WDL: WDL    Learning  Learning Assessment  Learning Readiness and Ability: no barriers identified  Education Provided  Person Taught: patient  Teaching Method: verbal instruction  Teaching Focus: symptom/problem overview  Education Outcome Evaluation: eager to learn    Respiratory  Respiratory  Airway WDL: WDL  Respiratory WDL  Respiratory WDL: .WDL except,  rhythm/pattern  Rhythm/Pattern, Respiratory: shortness of breath    Abdominal Pain       Pain Assessments  Pain (Adult)  (0-10) Pain Rating: Rest: 3  (0-10) Pain Rating: Activity: 3  Pain Location: chest    NIH Stroke Scale       Ashia Farfan RN  10/28/24 05:53 EDT

## 2024-10-28 NOTE — H&P
[facility]   HISTORY AND PHYSICAL    Patient Name: Claudia Price  : 1975  MRN: [id number]  Primary Care Physician:  Galina Blankenship MD  Date of admission: 10/28/2024    Subjective   Subjective     Chief Complaint:   Chief Complaint   Patient presents with    Chest Pain         HPI:    Claudia Price is a 49 y.o. female comes in complaining of chest pain that awoke her from sleep just prior to arrival.  Patient states the pain is in the left side of her chest and goes up into the left side of her neck and to her back.  Patient states that she thought it was indigestion but was significantly worse than usual.  Patient states she had some indigestion earlier in the evening as she had eaten ice cream at her daughter's 13th birthday party.  Patient denies any exertional symptoms.  Patient did initially have some associated shortness of breath but states her shortness of breath has resolved and denies any vomiting, diarrhea or any other pain.  Patient states she has a very dull aching sensation currently and states after the nitro that was given in the ED her symptoms significantly improved.  Patient did report some nausea as well.  Patient denies recent illness, fever, chills, cough.    In the ED, initial troponin less than 6, repeat troponin less than 6, proBNP negative, CBC and CMP largely unremarkable for acute findings.  Procalcitonin negative.  Chest x-ray shows nonspecific prominence of the perihilar interstitium.  Pneumonia is not excluded.  EKG shows sinus rhythm 60 bpm, no ST elevation apparent.  Patient is afebrile, pulse in the 50s, on room air oxygen 96% SpO2 and blood pressure normotensive.  Patient was given full dose aspirin, Nitropaste and morphine and Zofran.    Review of Systems   All systems were reviewed and negative except for: As per HPI    Personal History     Past Medical History:   Diagnosis Date    Allergic     Seasonal, Sinus issues    Asthma Early childhood    Borderline Asthma  "   Clotting disorder     Factor V leiden    Degenerative disc disease at L5-S1 level 2017    MRI 2017 2 mm disk bulge    GERD (gastroesophageal reflux disease)     Reflux occasionally    Headache     Triggers avoided, take Magnesium nightly, prn Ubrelvy    Low back pain     Lumbar disc herniation,  sciatica    Lumbar herniated disc 2014    MRI L4-5 mild also mild retrolisthesis    Microscopic hematuria     cystoscopy and MRI by Dr Rees 2015    Pneumonia     Covid Pneumonia     PONV (postoperative nausea and vomiting)     Pregnancy         Raynaud's disease        Past Surgical History:   Procedure Laterality Date    COLONOSCOPY N/A 2024    ENTIRE COLON WNL, RESCOPE IN 10 YRS, DR. LOVE HENNING AT [facility]    HYSTEROSCOPY      2016    MYOMECTOMY ABDOMINAL APPROACH  2017    Laparoscopic       Family History: family history includes Asthma in her father; Breast cancer in her maternal aunt; Cancer in her father and maternal aunt; Deep vein thrombosis in her father; Heart attack in her paternal grandfather; Kidney cancer (age of onset: 55) in her father and paternal aunt; Stroke in her maternal grandmother; Thrombophilia in her father. Otherwise pertinent FHx was reviewed and not pertinent to current issue.    Social History:  reports that she has never smoked. She has never used smokeless tobacco. She reports current alcohol use of about 2.0 standard drinks of alcohol per week. She reports that she does not use drugs.    Home Medications:  Evening Primrose Oil, Magnesium, NON FORMULARY, Probiotic Product, Triamcinolone Acetonide, Turmeric, Vitamin D3, albuterol sulfate HFA, cetirizine, and ubrogepant    Allergies:  Allergies   Allergen Reactions    Nedocromil Shortness Of Breath    Pseudoephedrine Anxiety and Other (See Comments)     Tachycardia 160's, \"felt like a heart attack\"       Objective   Objective     Vitals:   Temp:  [97.8 °F (36.6 °C)] 97.8 °F (36.6 °C)  Heart Rate:  [51-64] " 58  Resp:  [16] 16  BP: (101-156)/(69-86) 101/69  Physical Exam    Constitutional: Awake, alert   Eyes: PERRLA, sclerae anicteric, no conjunctival injection   HENT: NCAT, mucous membranes moist   Neck: Supple, no thyromegaly, no lymphadenopathy, trachea midline   Respiratory: Clear to auscultation bilaterally, nonlabored respirations    Cardiovascular: RRR, no murmurs, rubs, or gallops, palpable pedal pulses bilaterally   Gastrointestinal: Positive bowel sounds, soft, nontender, nondistended   Musculoskeletal: No bilateral ankle edema, no clubbing or cyanosis to extremities   Psychiatric: Appropriate affect, cooperative   Neurologic: Oriented x 3, strength symmetric in all extremities, Cranial Nerves grossly intact to confrontation, speech clear   Skin: No rashes     Result Review    Result Review:  I have personally reviewed the results from the time of this admission to 10/28/2024 06:02 EDT and agree with these findings:  [x]  Laboratory list / accordion  []  Microbiology  [x]  Radiology  [x]  EKG/Telemetry   []  Cardiology/Vascular   []  Pathology  []  Old records  []  Other:  Most notable findings include: see above      Assessment & Plan   Assessment / Plan     Brief Patient Summary:  Claudia Price is a 49 y.o. female who comes in complaining of chest pain    Active Hospital Problems:  Active Hospital Problems    Diagnosis     **Chest pain      Plan:       Chest pain  -initial troponin less than 6, repeat troponin less than 6,   -proBNP negative,   -CBC and CMP largely unremarkable for acute findings.    -Procalcitonin negative.    -Chest x-ray shows nonspecific prominence of the perihilar interstitium.  Pneumonia is not excluded.   - EKG shows sinus rhythm 60 bpm, no ST elevation apparent.    -Patient is afebrile, pulse in the 50s, on room air oxygen 96% SpO2 and blood pressure normotensive.    -Patient was given full dose aspirin, Nitropaste and morphine and Zofran.  -Cardiology consult  -Repeat troponin,  EKG  -Continuous cardiac monitoring  -N.p.o.    Asthma  -Not acute exacerbation, continue home inhalers      VTE Prophylaxis: SCDs  Mechanical VTE prophylaxis orders are present.        CODE STATUS:    Code Status (Patient has no pulse and is not breathing): CPR (Attempt to Resuscitate)  Medical Interventions (Patient has pulse or is breathing): Full Support    Admission Status:  I believe this patient meets observation status.    78 minutes have been spent by [facility] providers in the care of this patient while under observation status.      Appropriate PPE worn during patient encounter.  Hand hygeine performed before and after seeing the patient.      Electronically signed by CARMENCITA Encinas, 10/28/24, 5:44 AM EDT.

## 2024-10-29 ENCOUNTER — TRANSITIONAL CARE MANAGEMENT TELEPHONE ENCOUNTER (OUTPATIENT)
Dept: CALL CENTER | Facility: HOSPITAL | Age: 49
End: 2024-10-29
Payer: COMMERCIAL

## 2024-10-29 NOTE — OUTREACH NOTE
Call Center TCM Note      Flowsheet Row Responses   [facility] patient discharged from? [place]   Does the patient have one of the following disease processes/diagnoses(primary or secondary)? Other   TCM attempt successful? Yes   Call start time 1618   Call end time 1620   Discharge diagnosis Chest pain   Person spoke with today (if not patient) and relationship Patient   Meds reviewed with patient/caregiver? Yes  [New: pantoprazole and sucralfate]   Does the patient have all medications ordered at discharge? Yes   Is the patient taking all medications as directed (includes completed medication regime)? Yes   Comments PCP Dr Blankenship. Patient declines to schedule PCP appt with call today. She reports that she will be following up with specialists from GI and cardiology .   Does the patient have an appointment with their PCP within 7-14 days of discharge? No   Nursing Interventions Patient desires to follow up with specialty only, Routed TCM call to PCP office   Has home health visited the patient within 72 hours of discharge? N/A   Psychosocial issues? No   Did the patient receive a copy of their discharge instructions? Yes   Nursing interventions Reviewed instructions with patient   What is the patient's perception of their health status since discharge? Improving  [Patient denies any return of symptoms.]   Is the patient/caregiver able to teach back signs and symptoms related to disease process for when to call PCP? Yes   Is the patient/caregiver able to teach back signs and symptoms related to disease process for when to call 911? Yes   Is the patient/caregiver able to teach back the hierarchy of who to call/visit for symptoms/problems? PCP, Specialist, Home health nurse, Urgent Care, ED, 911 Yes   If the patient is a current smoker, are they able to teach back resources for cessation? Not a smoker   TCM call completed? Yes   Call end time 1620   Would this patient benefit from a Referral to [facility]  Work? No   Is the patient interested in additional calls from an ambulatory ? No            Sophie Hough RN    10/29/2024, 16:22 EDT

## 2024-10-29 NOTE — OUTREACH NOTE
Call Center TCM Note      Flowsheet Row Responses   [facility] patient discharged from? [place]   Does the patient have one of the following disease processes/diagnoses(primary or secondary)? Other   TCM attempt successful? No   Unsuccessful attempts Attempt 1  [Attempted to reach patient and spouse, listed on PCP verbal release. No answer.]            Sophie Hough RN    10/29/2024, 12:19 EDT

## 2024-10-29 NOTE — OUTREACH NOTE
Prep Survey      Flowsheet Row Responses   [facility] patient discharged from? [place]   Is LACE score < 7 ? Yes   Eligibility [facility]   Date of Admission 10/28/24   Date of Discharge 10/28/24   Discharge Disposition Home or Self Care   Discharge diagnosis Chest pain   Does the patient have one of the following disease processes/diagnoses(primary or secondary)? Other   Does the patient have Home health ordered? No   Is there a DME ordered? No   Prep survey completed? Yes            Racheal HAGAN - Registered Nurse

## 2024-11-04 ENCOUNTER — OFFICE VISIT (OUTPATIENT)
Dept: CARDIOLOGY | Facility: CLINIC | Age: 49
End: 2024-11-04
Payer: COMMERCIAL

## 2024-11-04 VITALS
OXYGEN SATURATION: 99 % | BODY MASS INDEX: 26.82 KG/M2 | SYSTOLIC BLOOD PRESSURE: 125 MMHG | DIASTOLIC BLOOD PRESSURE: 90 MMHG | HEIGHT: 65 IN | HEART RATE: 52 BPM | WEIGHT: 161 LBS

## 2024-11-04 DIAGNOSIS — E78.2 MIXED HYPERLIPIDEMIA: ICD-10-CM

## 2024-11-04 DIAGNOSIS — R07.89 CHEST PAIN, ATYPICAL: Primary | ICD-10-CM

## 2024-11-04 PROCEDURE — 99213 OFFICE O/P EST LOW 20 MIN: CPT | Performed by: NURSE PRACTITIONER

## 2024-11-04 PROCEDURE — 93000 ELECTROCARDIOGRAM COMPLETE: CPT | Performed by: NURSE PRACTITIONER

## 2024-11-04 NOTE — ASSESSMENT & PLAN NOTE
Patient with recent observation stay for atypical chest pain.  ECG nonischemic  Troponins negative  Symptoms of chest pain sound concerning for esophageal spasms versus reflux.  She does note improvement of symptoms since starting Carafate and pantoprazole  Patient has follow-up with GI  She exercises routinely without exertional symptoms.

## 2024-11-04 NOTE — ASSESSMENT & PLAN NOTE
LDL slightly elevated at 125.  Patient with family history of high cholesterol.  She is concerned about improving cardiovascular outcomes and cardiovascular risk reduction.  We did discuss that right now she does not want to start a statin drug.  We discussed utilizing a coronary calcium score to help differentiate between patient needing a statin for goal LDL of 70 versus continuing to monitor.  She was given written information on how to schedule these.  She will consider this option.

## 2024-11-04 NOTE — PROGRESS NOTES
"    CARDIOLOGY        Patient Name: Claudia Price  :1975  Age: 49 y.o.  Primary Cardiologist: Gabriel Taylor MD  Encounter Provider:  MIYA Cruz    Date of Service: 2024      CHIEF COMPLAINT / REASON FOR OFFICE VISIT     Follow-Up (Chest pain)      HISTORY OF PRESENT ILLNESS       HPI  Claudia Price is a 49 y.o. female who presents today for hospital follow-up.     Pt has a  history significant for asthma, HLD, factor V Leiden, DDD, GERD, Raynaud's disease.    Patient was seen in observation status by our service 10/28/2024 for left-sided chest pain.  It was determined that patient exercises routinely without any exertional symptoms.  Troponins were negative.  No further cardiac testing was indicated at that time.  Questioning gastritis or GERD and patient was placed on PPI.    Patient reports that she does feel that her symptoms are GI in nature.  She reports that she has an upcoming appointment with gastroenterology.  She does feel that pantoprazole and Carafate have helped with her symptoms.  She continues to exercise routinely with no exertional symptoms.  She also hikes routinely at high altitudes and has not had any exertional symptoms.  She does note that she has family history of high cholesterol and is concerned about her cholesterol numbers.    The following portions of the patient's history were reviewed and updated as appropriate: allergies, current medications, past family history, past medical history, past social history, past surgical history and problem list.      VITAL SIGNS     Visit Vitals  /90 (BP Location: Right arm, Patient Position: Sitting)   Pulse 52   Ht 165.1 cm (65\")   Wt 73 kg (161 lb)   SpO2 99%   BMI 26.79 kg/m²         Wt Readings from Last 3 Encounters:   24 73 kg (161 lb)   10/28/24 72.8 kg (160 lb 8 oz)   10/07/24 74.5 kg (164 lb 3.2 oz)     Body mass index is 26.79 kg/m².      REVIEW OF SYSTEMS     Review of Systems   Constitutional: Negative for " chills, fever, weight gain and weight loss.   Cardiovascular:  Negative for leg swelling.   Respiratory:  Negative for cough, snoring and wheezing.    Hematologic/Lymphatic: Negative for bleeding problem. Does not bruise/bleed easily.   Skin:  Negative for color change.   Musculoskeletal:  Negative for falls, joint pain and myalgias.   Gastrointestinal:  Negative for melena.   Genitourinary:  Negative for hematuria.   Neurological:  Negative for excessive daytime sleepiness.   Psychiatric/Behavioral:  Negative for depression. The patient is not nervous/anxious.            PHYSICAL EXAMINATION     Vitals and nursing note reviewed.   Constitutional:       Appearance: Normal appearance. Well-developed.   Eyes:      Conjunctiva/sclera: Conjunctivae normal.   Neck:      Vascular: No carotid bruit.   Pulmonary:      Breath sounds: Normal breath sounds.   Cardiovascular:      Normal rate. Regular rhythm. Normal S1 with normal intensity. Normal S2 with normal intensity.       Murmurs: There is no murmur.      No gallop.  No click. No rub.   Edema:     Peripheral edema absent.   Musculoskeletal: Normal range of motion. Skin:     General: Skin is warm and dry.   Neurological:      Mental Status: Alert and oriented to person, place, and time.      GCS: GCS eye subscore is 4. GCS verbal subscore is 5. GCS motor subscore is 6.   Psychiatric:         Speech: Speech normal.         Behavior: Behavior normal.         Thought Content: Thought content normal.         Judgment: Judgment normal.           REVIEWED DATA       ECG 12 Lead    Date/Time: 11/4/2024 12:05 PM  Performed by: Romi Coates APRN    Authorized by: Romi Coates APRN  Comparison: compared with previous ECG   Similar to previous ECG  Rhythm: sinus rhythm  Rate: normal  BPM: 71  Conduction: conduction normal  QRS axis: normal  Other findings: non-specific ST-T wave changes    Clinical impression: non-specific ECG              Lipid Panel           10/18/2024    08:50 10/28/2024    04:36   Lipid Panel   Total Cholesterol  192    Total Cholesterol 245     Triglycerides 123  123    HDL Cholesterol 56  45    VLDL Cholesterol 22  22    LDL Cholesterol  167  125    LDL/HDL Ratio  2.72        Lab Results   Component Value Date     10/28/2024     10/18/2024    K 4.2 10/28/2024    K 3.5 10/28/2024     10/28/2024     10/18/2024    CO2 25.0 10/28/2024    CO2 27.5 10/18/2024    BUN 15 10/28/2024    BUN 13 10/18/2024    CREATININE 0.81 10/28/2024    CREATININE 0.75 10/18/2024    EGFRIFNONA 149 11/08/2021    EGFRIFNONA >150 11/07/2021    EGFRIFAFRI >60 10/26/2015    EGFRIFAFRI 107 08/31/2015    GLUCOSE 89 10/28/2024    GLUCOSE 92 10/18/2024    CALCIUM 9.9 10/28/2024    CALCIUM 9.9 10/18/2024    PROTENTOTREF 7.5 10/18/2024    PROTENTOTREF 7.2 10/06/2023    ALBUMIN 4.3 10/28/2024    ALBUMIN 4.7 10/18/2024    BILITOT <0.2 10/28/2024    BILITOT 0.2 10/18/2024    AST 18 10/28/2024    AST 20 10/18/2024    ALT 15 10/28/2024    ALT 14 10/18/2024     Lab Results   Component Value Date    WBC 6.86 10/28/2024    WBC 5.53 10/18/2024    HGB 14.1 10/28/2024    HGB 14.0 10/18/2024    HCT 42.9 10/28/2024    HCT 42.4 10/18/2024    MCV 83.5 10/28/2024    MCV 86.5 10/18/2024     10/28/2024     10/18/2024     Lab Results   Component Value Date    PROBNP <36.0 10/28/2024    PROBNP 66.2 11/04/2021     Lab Results   Component Value Date    TROPONINT <6 10/28/2024     Lab Results   Component Value Date    TSH 0.850 10/18/2024    TSH 0.871 10/06/2023             ASSESSMENT & PLAN     Diagnoses and all orders for this visit:    1. Chest pain, atypical (Primary)  Assessment & Plan:  Patient with recent observation stay for atypical chest pain.  ECG nonischemic  Troponins negative  Symptoms of chest pain sound concerning for esophageal spasms versus reflux.  She does note improvement of symptoms since starting Carafate and pantoprazole  Patient has follow-up with  GI  She exercises routinely without exertional symptoms.      2. Mixed hyperlipidemia  Assessment & Plan:  LDL slightly elevated at 125.  Patient with family history of high cholesterol.  She is concerned about improving cardiovascular outcomes and cardiovascular risk reduction.  We did discuss that right now she does not want to start a statin drug.  We discussed utilizing a coronary calcium score to help differentiate between patient needing a statin for goal LDL of 70 versus continuing to monitor.  She was given written information on how to schedule these.  She will consider this option.      Other orders  -     ECG 12 Lead        No follow-ups on file.    Future Appointments         Provider Department Center    1/8/2025 10:00 AM Aleja Pereira PA-C [facility]    10/8/2025 9:00 AM Galina Blankenship MD [facility]              MEDICATIONS         Discharge Medications            Accurate as of November 4, 2024 11:48 AM. If you have any questions, ask your nurse or doctor.                Changes to Medications        Instructions Start Date   Ubrelvy 100 MG tablet  Generic drug: ubrogepant  What changed:   how to take this  additional instructions   100 mg, As Needed             Continue These Medications        Instructions Start Date   albuterol sulfate  (90 Base) MCG/ACT inhaler  Commonly known as: PROVENTIL HFA;VENTOLIN HFA;PROAIR HFA   Every 4 Hours Scheduled      cetirizine 10 MG tablet  Commonly known as: zyrTEC   10 mg      EVENING PRIMROSE OIL PO   Take  by mouth.      Magnesium 500 MG capsule   Take 1 capsule by mouth Every Night.      NON FORMULARY   Hops flower supplement      pantoprazole 40 MG EC tablet  Commonly known as: Protonix   40 mg, Oral, Daily      PROBIOTIC PO   1 tablet, Daily      sucralfate 1 g tablet  Commonly known as: CARAFATE   1 g, Oral, 3 Times Daily Before Meals      Triamcinolone  Acetonide 55 MCG/ACT nasal inhaler  Commonly known as: NASACORT   1 spray, Daily      TURMERIC PO   600 mg, Daily      Vitamin D3 50 MCG (2000 UT) capsule   2,000 Units, Daily                   **Dragon Disclaimer:   Much of this encounter note is an electronic transcription/translation of spoken language to printed text. The electronic translation of spoken language may permit erroneous, or at times, nonsensical words or phrases to be inadvertently transcribed. Although I have reviewed the note for such errors, some may still exist.

## 2025-01-08 ENCOUNTER — TELEPHONE (OUTPATIENT)
Dept: GASTROENTEROLOGY | Facility: CLINIC | Age: 50
End: 2025-01-08
Payer: COMMERCIAL

## 2025-01-08 ENCOUNTER — OFFICE VISIT (OUTPATIENT)
Dept: GASTROENTEROLOGY | Facility: CLINIC | Age: 50
End: 2025-01-08
Payer: COMMERCIAL

## 2025-01-08 VITALS
TEMPERATURE: 97.6 F | BODY MASS INDEX: 26.21 KG/M2 | WEIGHT: 157.3 LBS | HEIGHT: 65 IN | SYSTOLIC BLOOD PRESSURE: 133 MMHG | HEART RATE: 61 BPM | DIASTOLIC BLOOD PRESSURE: 88 MMHG | OXYGEN SATURATION: 98 %

## 2025-01-08 DIAGNOSIS — K21.9 GASTROESOPHAGEAL REFLUX DISEASE, UNSPECIFIED WHETHER ESOPHAGITIS PRESENT: Primary | ICD-10-CM

## 2025-01-08 PROCEDURE — 99204 OFFICE O/P NEW MOD 45 MIN: CPT | Performed by: PHYSICIAN ASSISTANT

## 2025-01-08 NOTE — PROGRESS NOTES
"Chief Complaint  Heartburn    Subjective          History Of Present Illness:    Claudia Price is a  49 y.o. female with a past medical history of hyperlipidemia, factor V Leiden, asthma, GERD, Raynaud's disease who presents as a new patient for evaluation of GERD and chest pain.    Patient works as a nurse here in the PACU.    Patient was seen in the emergency room on 10/28/2024 in the setting of epigastric pain and chest pain.  She was having some nausea and left shoulder and left jaw pain at that time.  She had negative troponins and her EKG was unremarkable.  Patient did receive a GI cocktail with improvement in her symptoms.    Patient reports she has a history of reflux intermittently. Patient finds it often occurs with regular use of acidic foods or alcohol. Patient has been off and on protonix therapy.  She most recently completed pantoprazole 40 mg daily and sucralfate daily for 30 days.  Patient denies any further episodes of chest pain. No nausea, vomiting, diarrhea, constipation, no black or bloody stools. She has had some issues with dairy products. She does A2 products ant takes a probitoic. No abnormal weight loss or poor appetite.     Colonoscopy 5/22/24 with Dr. Brad wood. Recall 10 years.     Objective   Vital Signs:   /88 (Patient Position: Sitting, Cuff Size: Adult)   Pulse 61   Temp 97.6 °F (36.4 °C)   Ht 165.1 cm (65\")   Wt 71.4 kg (157 lb 4.8 oz)   SpO2 98%   BMI 26.18 kg/m²       Physical Exam  Vitals reviewed.   Constitutional:       General: She is not in acute distress.     Appearance: Normal appearance. She is not ill-appearing.   HENT:      Head: Normocephalic and atraumatic.      Nose: Nose normal.      Mouth/Throat:      Pharynx: Oropharynx is clear.   Eyes:      Conjunctiva/sclera: Conjunctivae normal.   Pulmonary:      Effort: Pulmonary effort is normal.   Abdominal:      General: There is no distension.      Palpations: Abdomen is soft. There is no mass.      " Tenderness: There is no abdominal tenderness.   Musculoskeletal:         General: No swelling. Normal range of motion.      Cervical back: Normal range of motion.   Skin:     General: Skin is warm and dry.      Findings: No bruising or rash.   Neurological:      General: No focal deficit present.      Mental Status: She is alert and oriented to person, place, and time.      Motor: No weakness.      Gait: Gait normal.   Psychiatric:         Mood and Affect: Mood normal.          Result Review :   The following data was reviewed by: Aleja Aleman PA-C on 01/08/2025:  CMP          10/18/2024    08:50 10/28/2024    02:35 10/28/2024    12:03   CMP   Glucose 92  89     BUN 13  15     Creatinine 0.75  0.81     EGFR  88.6     Sodium 137  140     Potassium 4.4  3.5  4.2    Chloride 102  105     Calcium 9.9  9.9     Total Protein 7.5      Total Protein  7.4     Albumin 4.7  4.3     Globulin 2.8      Globulin  3.1     Total Bilirubin 0.2  <0.2     Alkaline Phosphatase 74  82     AST (SGOT) 20  18     ALT (SGPT) 14  15     Albumin/Globulin Ratio  1.4     BUN/Creatinine Ratio 17.3  18.5     Anion Gap  10.0       CBC          10/18/2024    08:50 10/28/2024    02:35   CBC   WBC 5.53  6.86    RBC 4.90  5.14    Hemoglobin 14.0  14.1    Hematocrit 42.4  42.9    MCV 86.5  83.5    MCH 28.6  27.4    MCHC 33.0  32.9    RDW 13.0  12.9    Platelets 285  277            Assessment and Plan    Diagnoses and all orders for this visit:    1. Gastroesophageal reflux disease, unspecified whether esophagitis present (Primary)  -     Case Request; Standing  -     Implement Anesthesia orders day of procedure.; Standing  -     Follow Anesthesia Guidelines / Protocol; Future  -     Case Request       Patient with intermittent reflux with recent exacerbation with possible esophageal spasms. Would recommend proceeding with EGD to evaluate for esophagitis, gastritis, Lindsey's esophagus, EOE, etc.  Patient is agreeable to plan of care.  Discussed  options including Pepcid as needed over-the-counter or PPIs as needed over-the-counter.  Further recommendations to be made based on the findings of her EGD.  Currently up-to-date on colorectal cancer screening.    Follow Up   Return for EGD.    Dragon dictation used throughout this note.            Aleja Pereira PA-C   [facility]  [address] [place] [address]  Office: [phone]

## 2025-03-26 ENCOUNTER — OFFICE VISIT (OUTPATIENT)
Dept: FAMILY MEDICINE CLINIC | Facility: CLINIC | Age: 50
End: 2025-03-26
Payer: COMMERCIAL

## 2025-03-26 VITALS
BODY MASS INDEX: 26.73 KG/M2 | DIASTOLIC BLOOD PRESSURE: 72 MMHG | WEIGHT: 160.4 LBS | OXYGEN SATURATION: 100 % | HEIGHT: 65 IN | SYSTOLIC BLOOD PRESSURE: 106 MMHG | HEART RATE: 73 BPM

## 2025-03-26 DIAGNOSIS — D68.51 HETEROZYGOUS FACTOR V LEIDEN MUTATION: ICD-10-CM

## 2025-03-26 DIAGNOSIS — J98.01 BRONCHOSPASM: ICD-10-CM

## 2025-03-26 DIAGNOSIS — R05.1 ACUTE COUGH: Primary | ICD-10-CM

## 2025-03-26 PROCEDURE — 99214 OFFICE O/P EST MOD 30 MIN: CPT | Performed by: FAMILY MEDICINE

## 2025-03-26 RX ORDER — ALBUTEROL SULFATE AND BUDESONIDE 90; 80 UG/1; UG/1
2 AEROSOL, METERED RESPIRATORY (INHALATION) 4 TIMES DAILY PRN
Qty: 10.7 G | Refills: 1 | Status: SHIPPED | OUTPATIENT
Start: 2025-03-26

## 2025-03-26 RX ORDER — ESTRADIOL 0.03 MG/D
FILM, EXTENDED RELEASE TRANSDERMAL
COMMUNITY
Start: 2025-02-19

## 2025-03-26 RX ORDER — EVE PRIMROSE/LINOLEIC/G-LENIC 1000 MG
CAPSULE ORAL
COMMUNITY

## 2025-03-26 RX ORDER — PROGESTERONE 100 MG/1
CAPSULE ORAL
COMMUNITY
Start: 2025-02-19

## 2025-03-26 RX ORDER — MARSHMALLOW ROOT 480 MG
CAPSULE ORAL
COMMUNITY

## 2025-03-26 NOTE — PATIENT INSTRUCTIONS
Continue allergy meds and take airsupra 2-4 times daily as needed.  If symptoms persist or worsen, let us know.

## 2025-03-28 ENCOUNTER — TELEPHONE (OUTPATIENT)
Dept: CARDIOLOGY | Age: 50
End: 2025-03-28

## 2025-03-28 DIAGNOSIS — I51.7 CARDIOMEGALY: Primary | ICD-10-CM

## 2025-03-28 NOTE — TELEPHONE ENCOUNTER
Caller: Claudia Price    Relationship to patient: Self    Best call back number: [phone]     Patient is needing: PER PT: SHE RECIEVED A CALL TO SCHED AN ECHO AND SHE WAS TRANSFERRED AND RECIEVED NO ANSWER. PT IS REQUESTING ME TO PUT A CALLBACK MSSG IN TO HAVE SCHEDULAR'S CALL HER. PLS CALL & ADVISE.

## 2025-04-09 ENCOUNTER — TELEPHONE (OUTPATIENT)
Dept: CARDIOLOGY | Age: 50
End: 2025-04-09
Payer: COMMERCIAL

## 2025-04-10 ENCOUNTER — ANESTHESIA (OUTPATIENT)
Dept: GASTROENTEROLOGY | Facility: HOSPITAL | Age: 50
End: 2025-04-10
Payer: COMMERCIAL

## 2025-04-10 ENCOUNTER — HOSPITAL ENCOUNTER (OUTPATIENT)
Facility: HOSPITAL | Age: 50
Setting detail: HOSPITAL OUTPATIENT SURGERY
Discharge: HOME | End: 2025-04-10
Attending: INTERNAL MEDICINE | Admitting: INTERNAL MEDICINE
Payer: COMMERCIAL

## 2025-04-10 ENCOUNTER — HOSPITAL ENCOUNTER (OUTPATIENT)
Dept: CARDIOLOGY | Facility: HOSPITAL | Age: 50
Discharge: HOME | End: 2025-04-10
Payer: COMMERCIAL

## 2025-04-10 ENCOUNTER — ANESTHESIA EVENT (OUTPATIENT)
Dept: GASTROENTEROLOGY | Facility: HOSPITAL | Age: 50
End: 2025-04-10
Payer: COMMERCIAL

## 2025-04-10 VITALS
SYSTOLIC BLOOD PRESSURE: 119 MMHG | RESPIRATION RATE: 17 BRPM | HEART RATE: 52 BPM | DIASTOLIC BLOOD PRESSURE: 77 MMHG | OXYGEN SATURATION: 100 % | WEIGHT: 156 LBS | BODY MASS INDEX: 25.96 KG/M2

## 2025-04-10 VITALS
BODY MASS INDEX: 26.66 KG/M2 | DIASTOLIC BLOOD PRESSURE: 80 MMHG | HEIGHT: 65 IN | HEART RATE: 52 BPM | SYSTOLIC BLOOD PRESSURE: 120 MMHG | WEIGHT: 160 LBS

## 2025-04-10 DIAGNOSIS — K21.9 GASTROESOPHAGEAL REFLUX DISEASE, UNSPECIFIED WHETHER ESOPHAGITIS PRESENT: ICD-10-CM

## 2025-04-10 DIAGNOSIS — I51.7 CARDIOMEGALY: ICD-10-CM

## 2025-04-10 LAB
AORTIC ARCH: 3 CM
AORTIC DIMENSIONLESS INDEX: 0.77 (DI)
ASCENDING AORTA: 3.4 CM
AV MEAN PRESS GRAD SYS DOP V1V2: 3 MMHG
AV VMAX SYS DOP: 117 CM/SEC
B-HCG UR QL: NEGATIVE
BH CV ECHO MEAS - ACS: 2 CM
BH CV ECHO MEAS - AO MAX PG: 5.5 MMHG
BH CV ECHO MEAS - AO ROOT DIAM: 3 CM
BH CV ECHO MEAS - AO V2 VTI: 29.7 CM
BH CV ECHO MEAS - AVA(I,D): 2.47 CM2
BH CV ECHO MEAS - EDV(CUBED): 110.6 ML
BH CV ECHO MEAS - EDV(MOD-SP2): 70 ML
BH CV ECHO MEAS - EDV(MOD-SP4): 88 ML
BH CV ECHO MEAS - EF(MOD-SP2): 61.4 %
BH CV ECHO MEAS - EF(MOD-SP4): 69.3 %
BH CV ECHO MEAS - ESV(CUBED): 31.8 ML
BH CV ECHO MEAS - ESV(MOD-SP2): 27 ML
BH CV ECHO MEAS - ESV(MOD-SP4): 27 ML
BH CV ECHO MEAS - FS: 34 %
BH CV ECHO MEAS - IVS/LVPW: 1.13 CM
BH CV ECHO MEAS - IVSD: 0.9 CM
BH CV ECHO MEAS - LAT PEAK E' VEL: 14.4 CM/SEC
BH CV ECHO MEAS - LV DIASTOLIC VOL/BSA (35-75): 48.9 CM2
BH CV ECHO MEAS - LV MASS(C)D: 137.1 GRAMS
BH CV ECHO MEAS - LV MAX PG: 4.8 MMHG
BH CV ECHO MEAS - LV MEAN PG: 2 MMHG
BH CV ECHO MEAS - LV SYSTOLIC VOL/BSA (12-30): 15 CM2
BH CV ECHO MEAS - LV V1 MAX: 109 CM/SEC
BH CV ECHO MEAS - LV V1 VTI: 23 CM
BH CV ECHO MEAS - LVIDD: 4.8 CM
BH CV ECHO MEAS - LVIDS: 3.2 CM
BH CV ECHO MEAS - LVOT AREA: 3.2 CM2
BH CV ECHO MEAS - LVOT DIAM: 2.01 CM
BH CV ECHO MEAS - LVPWD: 0.8 CM
BH CV ECHO MEAS - MED PEAK E' VEL: 10.1 CM/SEC
BH CV ECHO MEAS - MV A DUR: 0.12 SEC
BH CV ECHO MEAS - MV A MAX VEL: 52.3 CM/SEC
BH CV ECHO MEAS - MV DEC SLOPE: 359.1 CM/SEC2
BH CV ECHO MEAS - MV DEC TIME: 0.22 SEC
BH CV ECHO MEAS - MV E MAX VEL: 75 CM/SEC
BH CV ECHO MEAS - MV E/A: 1.43
BH CV ECHO MEAS - MV MAX PG: 2.9 MMHG
BH CV ECHO MEAS - MV MEAN PG: 1.02 MMHG
BH CV ECHO MEAS - MV P1/2T: 78.4 MSEC
BH CV ECHO MEAS - MV V2 VTI: 35.6 CM
BH CV ECHO MEAS - MVA(P1/2T): 2.8 CM2
BH CV ECHO MEAS - MVA(VTI): 2.06 CM2
BH CV ECHO MEAS - PA ACC TIME: 0.15 SEC
BH CV ECHO MEAS - PA V2 MAX: 92.3 CM/SEC
BH CV ECHO MEAS - PULM A REVS DUR: 0.11 SEC
BH CV ECHO MEAS - PULM A REVS VEL: 26.1 CM/SEC
BH CV ECHO MEAS - PULM DIAS VEL: 48.8 CM/SEC
BH CV ECHO MEAS - PULM S/D: 0.97
BH CV ECHO MEAS - PULM SYS VEL: 47.6 CM/SEC
BH CV ECHO MEAS - QP/QS: 0.54
BH CV ECHO MEAS - RAP SYSTOLE: 3 MMHG
BH CV ECHO MEAS - RV MAX PG: 1.76 MMHG
BH CV ECHO MEAS - RV V1 MAX: 66.4 CM/SEC
BH CV ECHO MEAS - RV V1 VTI: 14.7 CM
BH CV ECHO MEAS - RVOT DIAM: 1.85 CM
BH CV ECHO MEAS - SUP REN AO DIAM: 1.8 CM
BH CV ECHO MEAS - SV(LVOT): 73.2 ML
BH CV ECHO MEAS - SV(MOD-SP2): 43 ML
BH CV ECHO MEAS - SV(MOD-SP4): 61 ML
BH CV ECHO MEAS - SV(RVOT): 39.5 ML
BH CV ECHO MEAS - SVI(LVOT): 40.7 ML/M2
BH CV ECHO MEAS - SVI(MOD-SP2): 23.9 ML/M2
BH CV ECHO MEAS - SVI(MOD-SP4): 33.9 ML/M2
BH CV ECHO MEAS - TAPSE (>1.6): 2.48 CM
BH CV ECHO MEAS - TR MAX VEL: 4 CM/SEC
BH CV ECHO MEASUREMENTS AVERAGE E/E' RATIO: 6.12
BH CV XLRA - RV BASE: 3.2 CM
BH CV XLRA - RV LENGTH: 7.6 CM
BH CV XLRA - RV MID: 2.7 CM
BH CV XLRA - TDI S': 12.3 CM/SEC
EXPIRATION DATE: NORMAL
INTERNAL NEGATIVE CONTROL: NEGATIVE
INTERNAL POSITIVE CONTROL: POSITIVE
LEFT ATRIUM VOLUME INDEX: 23.4 ML/M2
LV EF BIPLANE MOD: 64.4 %
Lab: NORMAL
SINUS: 3.3 CM
STJ: 2.7 CM

## 2025-04-10 PROCEDURE — S0260 H&P FOR SURGERY: HCPCS | Performed by: INTERNAL MEDICINE

## 2025-04-10 PROCEDURE — 25810000003 LACTATED RINGERS PER 1000 ML

## 2025-04-10 PROCEDURE — 81025 URINE PREGNANCY TEST: CPT | Performed by: INTERNAL MEDICINE

## 2025-04-10 PROCEDURE — 25010000002 PROPOFOL 1000 MG/100ML EMULSION

## 2025-04-10 PROCEDURE — 25810000003 LACTATED RINGERS PER 1000 ML: Performed by: INTERNAL MEDICINE

## 2025-04-10 PROCEDURE — 43235 EGD DIAGNOSTIC BRUSH WASH: CPT | Performed by: INTERNAL MEDICINE

## 2025-04-10 PROCEDURE — 25010000002 LIDOCAINE 2% SOLUTION

## 2025-04-10 PROCEDURE — 93306 TTE W/DOPPLER COMPLETE: CPT

## 2025-04-10 RX ORDER — PROPOFOL 10 MG/ML
INJECTION, EMULSION INTRAVENOUS CONTINUOUS PRN
Status: DISCONTINUED | OUTPATIENT
Start: 2025-04-10 | End: 2025-04-10 | Stop reason: SURG

## 2025-04-10 RX ORDER — LIDOCAINE HYDROCHLORIDE 20 MG/ML
INJECTION, SOLUTION INFILTRATION; PERINEURAL AS NEEDED
Status: DISCONTINUED | OUTPATIENT
Start: 2025-04-10 | End: 2025-04-10 | Stop reason: SURG

## 2025-04-10 RX ORDER — SODIUM CHLORIDE, SODIUM LACTATE, POTASSIUM CHLORIDE, CALCIUM CHLORIDE 600; 310; 30; 20 MG/100ML; MG/100ML; MG/100ML; MG/100ML
1000 INJECTION, SOLUTION INTRAVENOUS CONTINUOUS
Status: DISCONTINUED | OUTPATIENT
Start: 2025-04-10 | End: 2025-04-10 | Stop reason: HOSPADM

## 2025-04-10 RX ORDER — SODIUM CHLORIDE, SODIUM LACTATE, POTASSIUM CHLORIDE, CALCIUM CHLORIDE 600; 310; 30; 20 MG/100ML; MG/100ML; MG/100ML; MG/100ML
INJECTION, SOLUTION INTRAVENOUS CONTINUOUS PRN
Status: DISCONTINUED | OUTPATIENT
Start: 2025-04-10 | End: 2025-04-10 | Stop reason: SURG

## 2025-04-10 RX ADMIN — PROPOFOL INJECTABLE EMULSION 150 MCG/KG/MIN: 10 INJECTION, EMULSION INTRAVENOUS at 13:34

## 2025-04-10 RX ADMIN — SODIUM CHLORIDE, SODIUM LACTATE, POTASSIUM CHLORIDE, AND CALCIUM CHLORIDE: 600; 310; 30; 20 INJECTION, SOLUTION INTRAVENOUS at 13:34

## 2025-04-10 RX ADMIN — LIDOCAINE HYDROCHLORIDE 60 MG: 20 INJECTION, SOLUTION INFILTRATION; PERINEURAL at 13:34

## 2025-04-10 RX ADMIN — SODIUM CHLORIDE, POTASSIUM CHLORIDE, SODIUM LACTATE AND CALCIUM CHLORIDE 1000 ML: 600; 310; 30; 20 INJECTION, SOLUTION INTRAVENOUS at 13:17

## 2025-04-10 NOTE — DISCHARGE INSTRUCTIONS

## 2025-04-10 NOTE — ANESTHESIA PREPROCEDURE EVALUATION
Anesthesia Evaluation     Patient summary reviewed   history of anesthetic complications:  PONV  NPO Solid Status: > 8 hours  NPO Liquid Status: > 2 hours           Airway   Mallampati: II  TM distance: >3 FB  Neck ROM: full  No difficulty expected  Dental - normal exam     Pulmonary     breath sounds clear to auscultation  (+) asthma,  (-) shortness of breath, recent URI, not a smoker  Cardiovascular   Exercise tolerance: good (4-7 METS)    Rhythm: regular  Rate: normal    (+) hyperlipidemia  (-) past MI, dysrhythmias, angina    ROS comment:   ECHO (4/10/2025) - Narrative  ·  Left ventricular systolic function is normal. Calculated left  ventricular EF = 64.4%  ·  Left ventricular diastolic function was normal.  ·  Normal right ventricular function  ·  No significant valvular abnormalities      Neuro/Psych  (+) headaches (Migraine hx)  (-) seizures, CVA  GI/Hepatic/Renal/Endo    (+) GERD  (-)  obesity    Musculoskeletal     (-) neck stiffness  Abdominal    Substance History      OB/GYN    (-)  Pregnant        Other   arthritis,     ROS/Med Hx Other:    - Factor V Leiden, heteroz                    Anesthesia Plan    ASA 2     MAC     (MAC anesthesia discussed with patient and/or patient representative. Risks (including but not limited to intra-op awareness), benefits, and alternatives were discussed. Understanding was voiced with an agreement to proceed with a MAC technique and General as a backup option. )    Anesthetic plan, risks, benefits, and alternatives have been provided, discussed and informed consent has been obtained with: patient.        CODE STATUS:

## 2025-04-10 NOTE — H&P
[facility]  Pre Procedure History & Physical    Chief Complaint:   Time for my egd     Subjective     HPI:   49 y.o. female presenting to endoscopy unit today for egd     Past Medical History:   Past Medical History:   Diagnosis Date    Allergic     Seasonal, Sinus issues    Arthritis     Asthma Early childhood    Borderline Asthma    Clotting disorder     Factor V leiden    Degenerative disc disease at L5-S1 level 2017    MRI 2017 2 mm disk bulge    Factor 5 Leiden mutation, heterozygous     GERD (gastroesophageal reflux disease)     Reflux occasionally    Headache     Triggers avoided, take Magnesium nightly, prn Ubrelvy    History of medical problems     Cardiomegaly noted on CXR     Hyperlipidemia     Low back pain     Lumbar disc herniation,  sciatica    Lumbar herniated disc 2014    MRI L4-5 mild also mild retrolisthesis    Microscopic hematuria     cystoscopy and MRI by Dr Rees 2015    Migraines     Pneumonia     Covid Pneumonia     PONV (postoperative nausea and vomiting)     Pregnancy         Raynaud's disease        Family History:  Family History   Problem Relation Age of Onset    Kidney cancer Father 55    Thrombophilia Father         Fastor V Leiden    Deep vein thrombosis Father     Asthma Father     Cancer Father         Renal Cell Carcinoma- also present in one of his siblings    Clotting disorder Father     Breast cancer Maternal Aunt     Cancer Maternal Aunt         Metastatic breast cancer    Kidney cancer Paternal Aunt 55    Stroke Maternal Grandmother     Heart attack Paternal Grandfather     Hyperlipidemia Mother     Malig Hyperthermia Neg Hx        Social History:   reports that she has never smoked. She has never used smokeless tobacco. She reports current alcohol use of about 2.0 standard drinks of alcohol per week. She reports that she does not use drugs.    Medications:   Medications Prior to Admission   Medication Sig Dispense Refill  Last Dose/Taking    Albuterol-Budesonide (Airsupra) 90-80 MCG/ACT aerosol Inhale 2 Inhalations 4 (Four) Times a Day As Needed (cough or shortness of breath). 10.7 g 1 Past Month    cetirizine (zyrTEC) 10 MG tablet Take 1 tablet by mouth.   4/9/2025    Cholecalciferol (Vitamin D3) 50 MCG (2000 UT) capsule Take 1 capsule by mouth Daily.   4/9/2025    estradiol (VIVELLE-DOT) 0.025 MG/24HR patch 2 (Two) Times a Week.   4/9/2025    Evening Primrose Oil 1000 MG capsule    4/9/2025    Magnesium 500 MG capsule Take 1 capsule by mouth Every Night.   4/9/2025    Misc Natural Products (DIM-plus) capsule    4/9/2025    Probiotic Product (PROBIOTIC PO) Take 1 tablet by mouth Daily.   4/9/2025    Progesterone (PROMETRIUM) 100 MG capsule    4/9/2025    Triamcinolone Acetonide (NASACORT) 55 MCG/ACT nasal inhaler 1 spray Daily.   4/9/2025    TURMERIC PO Take 600 mg by mouth Daily.   4/9/2025    albuterol sulfate  (90 Base) MCG/ACT inhaler Every 4 (Four) Hours.   More than a month    ubrogepant 100 MG tablet 1 tablet by Nasogastric route As Needed. (Patient taking differently: Take 1 tablet by mouth As Needed. Pt has never had to take)          Allergies:  Nedocromil and Pseudoephedrine    Objective     Blood pressure 128/90, pulse 60, resp. rate 16, weight 70.8 kg (156 lb), last menstrual period 03/28/2025, SpO2 100%, not currently breastfeeding.  Physical Exam:   General: patient awake, alert and cooperative    Assessment & Plan     Diagnosis:  Gerd     Anticipated Surgical Procedure:  egd    The risks, benefits, and alternatives of this procedure have been discussed with the patient or the responsible party- the patient understands and agrees to proceed.

## 2025-04-10 NOTE — ANESTHESIA POSTPROCEDURE EVALUATION
Patient: Claudia Price    Procedure Summary       Date: 04/10/25 Room / Location:  [facility]    Anesthesia Start: 1332 Anesthesia Stop: 1352    Procedure: ESOPHAGOGASTRODUODENOSCOPY (Esophagus) Diagnosis:       Gastroesophageal reflux disease, unspecified whether esophagitis present      (Gastroesophageal reflux disease, unspecified whether esophagitis present [K21.9])    Surgeons: Oj Robles MD Provider: Jacek Carr DO    Anesthesia Type: MAC ASA Status: 2            Anesthesia Type: MAC    Vitals  Vitals Value Taken Time   /77 04/10/25 14:10   Temp     Pulse 58 04/10/25 14:11   Resp 17 04/10/25 14:10   SpO2 100 % 04/10/25 14:11   Vitals shown include unfiled device data.        Post Anesthesia Care and Evaluation    Patient location during evaluation: bedside  Patient participation: complete - patient participated  Level of consciousness: awake and alert  Pain management: adequate    Airway patency: patent  Anesthetic complications: No anesthetic complications  PONV Status: controlled  Cardiovascular status: acceptable and hemodynamically stable  Respiratory status: acceptable, spontaneous ventilation and nonlabored ventilation  Hydration status: acceptable    Comments: /77   Pulse 52   Resp 17   Wt 70.8 kg (156 lb)   LMP 03/28/2025   SpO2 100%   BMI 25.96 kg/m²

## (undated) DEVICE — ADAPT CLN BIOGUARD AIR/H2O DISP

## (undated) DEVICE — LN SMPL CO2 SHTRM SD STREAM W/M LUER

## (undated) DEVICE — CANN O2 ETCO2 FITS ALL CONN CO2 SMPL A/ 7IN DISP LF

## (undated) DEVICE — TUBING, SUCTION, 1/4" X 10', STRAIGHT: Brand: MEDLINE

## (undated) DEVICE — KT ORCA ORCAPOD DISP STRL

## (undated) DEVICE — SENSR O2 OXIMAX FNGR A/ 18IN NONSTR

## (undated) DEVICE — BLCK/BITE BLOX W/DENTL/RIM W/STRAP 54F